# Patient Record
Sex: FEMALE | Race: BLACK OR AFRICAN AMERICAN | NOT HISPANIC OR LATINO | Employment: OTHER | URBAN - METROPOLITAN AREA
[De-identification: names, ages, dates, MRNs, and addresses within clinical notes are randomized per-mention and may not be internally consistent; named-entity substitution may affect disease eponyms.]

---

## 2017-02-28 ENCOUNTER — APPOINTMENT (OUTPATIENT)
Dept: PREADMISSION TESTING | Facility: HOSPITAL | Age: 57
End: 2017-02-28

## 2017-02-28 VITALS
OXYGEN SATURATION: 98 % | TEMPERATURE: 98.8 F | WEIGHT: 163.2 LBS | BODY MASS INDEX: 27.86 KG/M2 | RESPIRATION RATE: 20 BRPM | HEART RATE: 98 BPM | SYSTOLIC BLOOD PRESSURE: 136 MMHG | HEIGHT: 64 IN | DIASTOLIC BLOOD PRESSURE: 84 MMHG

## 2017-02-28 LAB
ANION GAP SERPL CALCULATED.3IONS-SCNC: 23 MMOL/L
BACTERIA UR QL AUTO: ABNORMAL /HPF
BILIRUB UR QL STRIP: NEGATIVE
BUN BLD-MCNC: 14 MG/DL (ref 6–20)
BUN/CREAT SERPL: 14.7 (ref 7–25)
CALCIUM SPEC-SCNC: 10 MG/DL (ref 8.6–10.5)
CHLORIDE SERPL-SCNC: 101 MMOL/L (ref 98–107)
CLARITY UR: CLEAR
CO2 SERPL-SCNC: 15 MMOL/L (ref 22–29)
COLOR UR: YELLOW
CREAT BLD-MCNC: 0.95 MG/DL (ref 0.57–1)
DEPRECATED RDW RBC AUTO: 47.3 FL (ref 37–54)
ERYTHROCYTE [DISTWIDTH] IN BLOOD BY AUTOMATED COUNT: 15.7 % (ref 11.7–13)
GFR SERPL CREATININE-BSD FRML MDRD: 73 ML/MIN/1.73
GLUCOSE BLD-MCNC: 86 MG/DL (ref 65–99)
GLUCOSE UR STRIP-MCNC: NEGATIVE MG/DL
HCT VFR BLD AUTO: 43.3 % (ref 35.6–45.5)
HGB BLD-MCNC: 13.8 G/DL (ref 11.9–15.5)
HGB UR QL STRIP.AUTO: NEGATIVE
HYALINE CASTS UR QL AUTO: ABNORMAL /LPF
KETONES UR QL STRIP: NEGATIVE
LEUKOCYTE ESTERASE UR QL STRIP.AUTO: ABNORMAL
MCH RBC QN AUTO: 26.7 PG (ref 26.9–32)
MCHC RBC AUTO-ENTMCNC: 31.9 G/DL (ref 32.4–36.3)
MCV RBC AUTO: 83.9 FL (ref 80.5–98.2)
NITRITE UR QL STRIP: NEGATIVE
PH UR STRIP.AUTO: 6 [PH] (ref 5–8)
PLATELET # BLD AUTO: 277 10*3/MM3 (ref 140–500)
PMV BLD AUTO: 9.6 FL (ref 6–12)
POTASSIUM BLD-SCNC: 4.8 MMOL/L (ref 3.5–5.2)
PROT UR QL STRIP: NEGATIVE
RBC # BLD AUTO: 5.16 10*6/MM3 (ref 3.9–5.2)
RBC # UR: ABNORMAL /HPF
REF LAB TEST METHOD: ABNORMAL
SODIUM BLD-SCNC: 139 MMOL/L (ref 136–145)
SP GR UR STRIP: 1.01 (ref 1–1.03)
SQUAMOUS #/AREA URNS HPF: ABNORMAL /HPF
UROBILINOGEN UR QL STRIP: ABNORMAL
WBC NRBC COR # BLD: 7.66 10*3/MM3 (ref 4.5–10.7)
WBC UR QL AUTO: ABNORMAL /HPF

## 2017-02-28 PROCEDURE — 85027 COMPLETE CBC AUTOMATED: CPT | Performed by: UROLOGY

## 2017-02-28 PROCEDURE — 36415 COLL VENOUS BLD VENIPUNCTURE: CPT

## 2017-02-28 PROCEDURE — 81001 URINALYSIS AUTO W/SCOPE: CPT | Performed by: UROLOGY

## 2017-02-28 PROCEDURE — 80048 BASIC METABOLIC PNL TOTAL CA: CPT | Performed by: UROLOGY

## 2017-02-28 RX ORDER — DILTIAZEM HYDROCHLORIDE 60 MG/1
60 TABLET, FILM COATED ORAL 2 TIMES DAILY
COMMUNITY
End: 2020-03-04 | Stop reason: ALTCHOICE

## 2017-02-28 RX ORDER — OXYCODONE HYDROCHLORIDE AND ACETAMINOPHEN 5; 325 MG/1; MG/1
1 TABLET ORAL EVERY 6 HOURS PRN
COMMUNITY
End: 2017-03-01 | Stop reason: HOSPADM

## 2017-03-01 ENCOUNTER — HOSPITAL ENCOUNTER (OUTPATIENT)
Facility: HOSPITAL | Age: 57
Setting detail: HOSPITAL OUTPATIENT SURGERY
Discharge: HOME | End: 2017-03-01
Attending: UROLOGY | Admitting: UROLOGY

## 2017-03-01 ENCOUNTER — ANESTHESIA (OUTPATIENT)
Dept: PERIOP | Facility: HOSPITAL | Age: 57
End: 2017-03-01

## 2017-03-01 ENCOUNTER — ANESTHESIA EVENT (OUTPATIENT)
Dept: PERIOP | Facility: HOSPITAL | Age: 57
End: 2017-03-01

## 2017-03-01 ENCOUNTER — APPOINTMENT (OUTPATIENT)
Dept: GENERAL RADIOLOGY | Facility: HOSPITAL | Age: 57
End: 2017-03-01

## 2017-03-01 VITALS
WEIGHT: 161.19 LBS | HEIGHT: 64 IN | TEMPERATURE: 98.6 F | BODY MASS INDEX: 27.52 KG/M2 | DIASTOLIC BLOOD PRESSURE: 68 MMHG | RESPIRATION RATE: 16 BRPM | SYSTOLIC BLOOD PRESSURE: 127 MMHG | HEART RATE: 88 BPM | OXYGEN SATURATION: 98 %

## 2017-03-01 DIAGNOSIS — N20.1 CALCULUS OF LEFT URETER: ICD-10-CM

## 2017-03-01 PROCEDURE — 25010000002 FENTANYL CITRATE (PF) 100 MCG/2ML SOLUTION: Performed by: NURSE ANESTHETIST, CERTIFIED REGISTERED

## 2017-03-01 PROCEDURE — C1769 GUIDE WIRE: HCPCS | Performed by: UROLOGY

## 2017-03-01 PROCEDURE — 74420 UROGRAPHY RTRGR +-KUB: CPT

## 2017-03-01 PROCEDURE — C2617 STENT, NON-COR, TEM W/O DEL: HCPCS | Performed by: UROLOGY

## 2017-03-01 PROCEDURE — 0 IOVERSOL 74 % SOLUTION: Performed by: UROLOGY

## 2017-03-01 PROCEDURE — 25010000002 GENTAMICIN PER 80 MG: Performed by: NURSE ANESTHETIST, CERTIFIED REGISTERED

## 2017-03-01 PROCEDURE — 82360 CALCULUS ASSAY QUANT: CPT | Performed by: UROLOGY

## 2017-03-01 PROCEDURE — 25010000002 DEXAMETHASONE PER 1 MG: Performed by: NURSE ANESTHETIST, CERTIFIED REGISTERED

## 2017-03-01 PROCEDURE — 25010000002 PROPOFOL 10 MG/ML EMULSION: Performed by: NURSE ANESTHETIST, CERTIFIED REGISTERED

## 2017-03-01 PROCEDURE — 25010000002 MIDAZOLAM PER 1 MG: Performed by: ANESTHESIOLOGY

## 2017-03-01 PROCEDURE — 25010000002 ONDANSETRON PER 1 MG: Performed by: NURSE ANESTHETIST, CERTIFIED REGISTERED

## 2017-03-01 DEVICE — URETERAL STENT
Type: IMPLANTABLE DEVICE | Site: URETER | Status: FUNCTIONAL
Brand: CONTOUR™

## 2017-03-01 RX ORDER — ACETAMINOPHEN 500 MG
500 TABLET ORAL EVERY 6 HOURS PRN
COMMUNITY

## 2017-03-01 RX ORDER — LIDOCAINE HYDROCHLORIDE 20 MG/ML
INJECTION, SOLUTION INFILTRATION; PERINEURAL AS NEEDED
Status: DISCONTINUED | OUTPATIENT
Start: 2017-03-01 | End: 2017-03-01 | Stop reason: SURG

## 2017-03-01 RX ORDER — GENTAMICIN SULFATE 40 MG/ML
INJECTION, SOLUTION INTRAMUSCULAR; INTRAVENOUS AS NEEDED
Status: DISCONTINUED | OUTPATIENT
Start: 2017-03-01 | End: 2017-03-01 | Stop reason: SURG

## 2017-03-01 RX ORDER — FENTANYL CITRATE 50 UG/ML
INJECTION, SOLUTION INTRAMUSCULAR; INTRAVENOUS
Status: DISCONTINUED
Start: 2017-03-01 | End: 2017-03-01 | Stop reason: HOSPADM

## 2017-03-01 RX ORDER — OXYCODONE AND ACETAMINOPHEN 7.5; 325 MG/1; MG/1
1 TABLET ORAL ONCE AS NEEDED
Status: DISCONTINUED | OUTPATIENT
Start: 2017-03-01 | End: 2017-03-01 | Stop reason: HOSPADM

## 2017-03-01 RX ORDER — MIDAZOLAM HYDROCHLORIDE 1 MG/ML
2 INJECTION INTRAMUSCULAR; INTRAVENOUS
Status: DISCONTINUED | OUTPATIENT
Start: 2017-03-01 | End: 2017-03-01 | Stop reason: HOSPADM

## 2017-03-01 RX ORDER — SODIUM CHLORIDE, SODIUM LACTATE, POTASSIUM CHLORIDE, CALCIUM CHLORIDE 600; 310; 30; 20 MG/100ML; MG/100ML; MG/100ML; MG/100ML
9 INJECTION, SOLUTION INTRAVENOUS CONTINUOUS
Status: DISCONTINUED | OUTPATIENT
Start: 2017-03-01 | End: 2017-03-01 | Stop reason: HOSPADM

## 2017-03-01 RX ORDER — LABETALOL HYDROCHLORIDE 5 MG/ML
5 INJECTION, SOLUTION INTRAVENOUS
Status: DISCONTINUED | OUTPATIENT
Start: 2017-03-01 | End: 2017-03-01 | Stop reason: HOSPADM

## 2017-03-01 RX ORDER — ONDANSETRON 2 MG/ML
INJECTION INTRAMUSCULAR; INTRAVENOUS AS NEEDED
Status: DISCONTINUED | OUTPATIENT
Start: 2017-03-01 | End: 2017-03-01 | Stop reason: SURG

## 2017-03-01 RX ORDER — GENTAMICIN SULFATE 60 MG/50ML
120 INJECTION, SOLUTION INTRAVENOUS
Status: DISCONTINUED | OUTPATIENT
Start: 2017-03-02 | End: 2017-03-01 | Stop reason: HOSPADM

## 2017-03-01 RX ORDER — NITROFURANTOIN 25; 75 MG/1; MG/1
100 CAPSULE ORAL 2 TIMES DAILY
Qty: 14 CAPSULE | Refills: 0 | Status: SHIPPED | OUTPATIENT
Start: 2017-03-01 | End: 2017-03-08

## 2017-03-01 RX ORDER — ONDANSETRON 4 MG/1
4 TABLET, FILM COATED ORAL ONCE AS NEEDED
Status: DISCONTINUED | OUTPATIENT
Start: 2017-03-01 | End: 2017-03-01 | Stop reason: HOSPADM

## 2017-03-01 RX ORDER — SODIUM CHLORIDE, SODIUM LACTATE, POTASSIUM CHLORIDE, CALCIUM CHLORIDE 600; 310; 30; 20 MG/100ML; MG/100ML; MG/100ML; MG/100ML
100 INJECTION, SOLUTION INTRAVENOUS CONTINUOUS
Status: DISCONTINUED | OUTPATIENT
Start: 2017-03-01 | End: 2017-03-01 | Stop reason: HOSPADM

## 2017-03-01 RX ORDER — PROPOFOL 10 MG/ML
VIAL (ML) INTRAVENOUS AS NEEDED
Status: DISCONTINUED | OUTPATIENT
Start: 2017-03-01 | End: 2017-03-01 | Stop reason: SURG

## 2017-03-01 RX ORDER — SODIUM CHLORIDE 0.9 % (FLUSH) 0.9 %
1-10 SYRINGE (ML) INJECTION AS NEEDED
Status: DISCONTINUED | OUTPATIENT
Start: 2017-03-01 | End: 2017-03-01 | Stop reason: HOSPADM

## 2017-03-01 RX ORDER — OXYCODONE AND ACETAMINOPHEN 7.5; 325 MG/1; MG/1
1-2 TABLET ORAL EVERY 4 HOURS PRN
Qty: 50 TABLET | Refills: 0 | Status: SHIPPED | OUTPATIENT
Start: 2017-03-01 | End: 2020-03-04 | Stop reason: ALTCHOICE

## 2017-03-01 RX ORDER — HYDRALAZINE HYDROCHLORIDE 20 MG/ML
5 INJECTION INTRAMUSCULAR; INTRAVENOUS
Status: DISCONTINUED | OUTPATIENT
Start: 2017-03-01 | End: 2017-03-01 | Stop reason: HOSPADM

## 2017-03-01 RX ORDER — FENTANYL CITRATE 50 UG/ML
50 INJECTION, SOLUTION INTRAMUSCULAR; INTRAVENOUS
Status: DISCONTINUED | OUTPATIENT
Start: 2017-03-01 | End: 2017-03-01 | Stop reason: HOSPADM

## 2017-03-01 RX ORDER — MIDAZOLAM HYDROCHLORIDE 1 MG/ML
1 INJECTION INTRAMUSCULAR; INTRAVENOUS
Status: DISCONTINUED | OUTPATIENT
Start: 2017-03-01 | End: 2017-03-01 | Stop reason: HOSPADM

## 2017-03-01 RX ORDER — DEXAMETHASONE SODIUM PHOSPHATE 10 MG/ML
INJECTION INTRAMUSCULAR; INTRAVENOUS AS NEEDED
Status: DISCONTINUED | OUTPATIENT
Start: 2017-03-01 | End: 2017-03-01 | Stop reason: SURG

## 2017-03-01 RX ORDER — ONDANSETRON 4 MG/1
4 TABLET, FILM COATED ORAL DAILY PRN
Qty: 30 TABLET | Refills: 1 | Status: SHIPPED | OUTPATIENT
Start: 2017-03-01 | End: 2018-03-01

## 2017-03-01 RX ORDER — IPRATROPIUM BROMIDE AND ALBUTEROL SULFATE 2.5; .5 MG/3ML; MG/3ML
3 SOLUTION RESPIRATORY (INHALATION) ONCE AS NEEDED
Status: DISCONTINUED | OUTPATIENT
Start: 2017-03-01 | End: 2017-03-01 | Stop reason: HOSPADM

## 2017-03-01 RX ORDER — ONDANSETRON 2 MG/ML
4 INJECTION INTRAMUSCULAR; INTRAVENOUS ONCE AS NEEDED
Status: DISCONTINUED | OUTPATIENT
Start: 2017-03-01 | End: 2017-03-01 | Stop reason: HOSPADM

## 2017-03-01 RX ORDER — FENTANYL CITRATE 50 UG/ML
INJECTION, SOLUTION INTRAMUSCULAR; INTRAVENOUS AS NEEDED
Status: DISCONTINUED | OUTPATIENT
Start: 2017-03-01 | End: 2017-03-01 | Stop reason: SURG

## 2017-03-01 RX ORDER — FAMOTIDINE 10 MG/ML
20 INJECTION, SOLUTION INTRAVENOUS ONCE
Status: COMPLETED | OUTPATIENT
Start: 2017-03-01 | End: 2017-03-01

## 2017-03-01 RX ADMIN — FENTANYL CITRATE 50 MCG: 50 INJECTION INTRAMUSCULAR; INTRAVENOUS at 18:00

## 2017-03-01 RX ADMIN — DEXAMETHASONE SODIUM PHOSPHATE 8 MG: 10 INJECTION INTRAMUSCULAR; INTRAVENOUS at 17:00

## 2017-03-01 RX ADMIN — LIDOCAINE HYDROCHLORIDE 60 MG: 20 INJECTION, SOLUTION INFILTRATION; PERINEURAL at 16:54

## 2017-03-01 RX ADMIN — FAMOTIDINE 20 MG: 10 INJECTION, SOLUTION INTRAVENOUS at 16:04

## 2017-03-01 RX ADMIN — OXYCODONE HYDROCHLORIDE AND ACETAMINOPHEN 1 TABLET: 7.5; 325 TABLET ORAL at 18:11

## 2017-03-01 RX ADMIN — SODIUM CHLORIDE, POTASSIUM CHLORIDE, SODIUM LACTATE AND CALCIUM CHLORIDE: 600; 310; 30; 20 INJECTION, SOLUTION INTRAVENOUS at 16:39

## 2017-03-01 RX ADMIN — MIDAZOLAM 1 MG: 1 INJECTION INTRAMUSCULAR; INTRAVENOUS at 16:04

## 2017-03-01 RX ADMIN — FENTANYL CITRATE 50 MCG: 50 INJECTION INTRAMUSCULAR; INTRAVENOUS at 18:12

## 2017-03-01 RX ADMIN — PROPOFOL 200 MG: 10 INJECTION, EMULSION INTRAVENOUS at 16:54

## 2017-03-01 RX ADMIN — GENTAMICIN SULFATE 120 MG: 40 INJECTION, SOLUTION INTRAMUSCULAR; INTRAVENOUS at 16:49

## 2017-03-01 RX ADMIN — ONDANSETRON 4 MG: 2 INJECTION INTRAMUSCULAR; INTRAVENOUS at 17:12

## 2017-03-01 RX ADMIN — FENTANYL CITRATE 100 MCG: 50 INJECTION INTRAMUSCULAR; INTRAVENOUS at 16:54

## 2017-03-01 NOTE — PLAN OF CARE
Problem: Patient Care Overview (Adult)  Goal: Plan of Care Review  Outcome: Outcome(s) achieved Date Met:  03/01/17 03/01/17 1846   Coping/Psychosocial Response Interventions   Plan Of Care Reviewed With patient;friend   Patient Care Overview   Progress improving   Outcome Evaluation   Outcome Summary/Follow up Plan ready for d/c home       Goal: Adult Individualization and Mutuality  Outcome: Outcome(s) achieved Date Met:  03/01/17  Goal: Discharge Needs Assessment  Outcome: Outcome(s) achieved Date Met:  03/01/17    Problem: Perioperative Period (Adult)  Goal: Signs and Symptoms of Listed Potential Problems Will be Absent or Manageable (Perioperative Period)  Outcome: Outcome(s) achieved Date Met:  03/01/17

## 2017-03-01 NOTE — ANESTHESIA POSTPROCEDURE EVALUATION
Patient: Cherelle Ayala    Procedure Summary     Date Anesthesia Start Anesthesia Stop Room / Location    03/01/17 [id number]  [facility] / [facility]       Procedure Diagnosis Surgeon Provider    LT URETEROSCOPY LASER LITHOTRIPSY WITH STENT INSERTION AND STONE BASKET EXTRACTION BILATERAL RETROGRADE PYLOGRAM LASER LITHROTRIPSY (Left Ureter) Ureteral calculus, left Buddyer MD Zhou Larkin MD          Anesthesia Type: general  Last vitals  /68 (03/01/17 1840)    Temp 37 °C (98.6 °F) (03/01/17 1810)    Pulse 88 (03/01/17 1840)   Resp 16 (03/01/17 1840)    SpO2 98 % (03/01/17 1840)      Post Anesthesia Care and Evaluation    Patient location during evaluation: PHASE II  Anesthetic complications: No anesthetic complications

## 2017-03-01 NOTE — ANESTHESIA PREPROCEDURE EVALUATION
Anesthesia Evaluation     Patient summary reviewed and Nursing notes reviewed   no history of anesthetic complications:  NPO Status: > 8 hours   Airway   Mallampati: II  TM distance: >3 FB  Neck ROM: full  no difficulty expected  Dental    (+) poor dentation        Pulmonary - negative pulmonary ROS and normal exam   Cardiovascular - negative cardio ROS and normal exam        Neuro/Psych  GI/Hepatic/Renal/Endo    (+)  chronic renal disease stones,     ROS Comment: C/O nausea but no vomiting    Musculoskeletal     Abdominal    Substance History      OB/GYN          Other                                    Anesthesia Plan    ASA 2     general     Anesthetic plan and risks discussed with patient.

## 2017-03-01 NOTE — DISCHARGE INSTRUCTIONS
You had one Percocet for pain at 6:11 pm.      Outpatient Surgery Guidelines, Adult  Outpatient procedures are those for which the person having the procedure is allowed to go home the same day as the procedure. Various procedures are done on an outpatient basis. You should follow some general guidelines if you will be having an outpatient procedure.  AFTER THE  PROCEDURE  After surgery, you will be taken to a recovery area, where your progress will be monitored. If there are no complications, you will be allowed to go home when you are awake, stable, and taking fluids well. You may have numbness around the surgical site. Healing will take some time. You will have tenderness at the surgical site and may have some swelling and bruising. You may also have some nausea.  HOME CARE INSTRUCTIONS  · Do not drive for 24 hours, or as directed by your health care provider. Do not drive while taking prescription pain medicines.  · Do not drink alcohol for 24 hours.  · Do not make important decisions or sign legal documents for 24 hours.  · Plan on having a responsible adult stay with your for 24 hours following your procedure.  · You may resume a normal diet and activities as directed.  · Do not lift anything heavier than 10 pounds (4.5 kg) or play contact sports until your health care provider says it is okay.  · Only take over-the-counter or prescription medicines as directed by your health care provider.  · Follow up with your health care provider as directed.  SEEK MEDICAL CARE IF:  · You have increased bleeding (more than a small spot) from the surgical site.  · You have redness, swelling, or increasing pain in the wound.  · You see pus coming from the wound.  · You have a fever > 101.  · You notice a bad smell coming from the wound or dressing.  · You feel lightheaded or faint.  · You develop a rash.  · You have trouble breathing.  · You develop allergies.  MAKE SURE YOU:  · Understand these instructions.  · Will watch  your condition.  · Will get help right away if you are not doing well or get worse.

## 2017-03-01 NOTE — BRIEF OP NOTE
URETEROSCOPY LASER LITHOTRIPSY WITH STENT INSERTION  Procedure Note    Cherelle ANDUJAR Jamie  3/1/2017    Pre-op Diagnosis:   * No pre-op diagnosis entered *    Post-op Diagnosis:     Post-Op Diagnosis Codes:     * Ureteral calculus, left [N20.1]    Procedure/CPT® Codes:      Procedure(s):  LT URETEROSCOPY LASER LITHOTRIPSY WITH STENT INSERTION AND STONE BASKET EXTRACTION     Surgeon(s):  Arnol Hernandez MD    Anesthesia: General    Staff:   Circulator: Yuridia Deluna RN  Laser Staff: Elizabeth Abad RN  Radiology Technologist: Marcelina Mancia  Scrub Person: Jovana Rodriguez    Estimated Blood Loss: * No values recorded between 3/1/2017  4:48 PM and 3/1/2017  5:29 PM *  Urine Voided: 100 mL    Specimens:                  ID Type Source Tests Collected by Time Destination   1 : LEFT KIDNEY STONE Calculus Kidney, Left STONE ANALYSIS Arnol Hernandez MD 3/1/2017 1719          Drains:           Findings: left mid ureteral stone    Complications: none      Arnol Hernandez MD     Date: 3/1/2017  Time: 5:31 PM

## 2017-03-01 NOTE — PLAN OF CARE
Problem: Patient Care Overview (Adult)  Goal: Plan of Care Review  Outcome: Ongoing (interventions implemented as appropriate)    03/01/17 1541   Coping/Psychosocial Response Interventions   Plan Of Care Reviewed With patient       Goal: Adult Individualization and Mutuality  Outcome: Ongoing (interventions implemented as appropriate)    03/01/17 1541   Individualization   Patient Specific Preferences pt goes by [facility]       Goal: Discharge Needs Assessment  Outcome: Ongoing (interventions implemented as appropriate)    Problem: Perioperative Period (Adult)  Goal: Signs and Symptoms of Listed Potential Problems Will be Absent or Manageable (Perioperative Period)  Outcome: Ongoing (interventions implemented as appropriate)    03/01/17 1541   Perioperative Period   Problems Assessed (Perioperative Period) pain   Problems Present (Perioperative Period) none

## 2017-03-01 NOTE — ANESTHESIA PROCEDURE NOTES
Airway  Urgency: elective    Airway not difficult    General Information and Staff    Patient location during procedure: OR  Anesthesiologist: FERNANDO SCHREIBER  CRNA: CHRISTA YU    Indications and Patient Condition  Indications for airway management: airway protection    Preoxygenated: yes  Mask difficulty assessment: 1 - vent by mask    Final Airway Details  Final airway type: supraglottic airway      Successful airway: classic  Size 4    Number of attempts at approach: 1    Additional Comments  PreO2, IV induction, easy mask, LMA placed w/o difficulty, cuff as packaged- no additional air placed, secured in placed, EBBSH, +etCO2, atraumatic, very very, poor dental hygiene, teeth decayed, chipped, broken, cracked, and missing,  teeth and lips as preop.

## 2017-03-02 NOTE — OP NOTE
DATE OF PROCEDURE:  03/01/2017    PREOPERATIVE DIAGNOSIS: Left distal ureteral calculus.     POSTOPERATIVE DIAGNOSIS: Left mid ureteral calculus.     PROCEDURES PERFORMED:  1.  Cystoscopy with left retrograde pyelogram.    2.  Left ureteroscopy with laser lithotripsy and basket extraction of fragments.    3.  Left double-J stent placement.     SURGEON: Arnol Hernandez MD    ANESTHESIA: General.     DRAINS: A 6-Divehi x 24 cm double-J stent.     COMPLICATIONS: None.     SPECIMENS: Stone fragments.     INDICATIONS: This 56-year-old female has a distal obstructing calculus, now presents for ureteroscopic ablation.     DESCRIPTION OF PROCEDURE: The patient was taken to the operative suite and given general anesthesia. She was placed in lithotomy position. Prepped and draped in sterile fashion. Panendoscopy was performed. Bladder was thoroughly visualized. The left orifice was cannulated with a Pollack catheter.  A retrograde pyelogram showed the stone being more in the mid ureteral segment. A guide wire was passed up. Ureteroscope was inserted and passed around the guide wire, up the ureter to the level of the stone. A 365 micron holmium fiber was then passed.  The stone was destroyed into multiple fragments and then these fragments were removed with a basket. A 6-Divehi x 26 cm double-J stent was then placed without a tether. Her bladder was drained. She was awoken and taken to recovery in stable condition.      Arnol Hernandez M.D.  OLINDA:lashawn  D:   03/01/2017 22:40:44  T:   03/02/2017 00:53:56  Job ID:   [id number]  Document ID:   [id number]  cc:

## 2017-03-08 LAB
CA PHOS CRY STONE QL IR: 5 %
COLOR STONE: NORMAL
COM CRY STONE QL IR: 95 %
COMPN STONE: NORMAL
Lab: NORMAL
Lab: NORMAL
NIDUS STONE QL: NORMAL
PATH REPORT.COMMENTS IMP SPEC: NORMAL
SIZE STONE: NORMAL MM
WT STONE: 10.6 MG

## 2018-04-18 ENCOUNTER — APPOINTMENT (OUTPATIENT)
Dept: GENERAL RADIOLOGY | Facility: HOSPITAL | Age: 58
End: 2018-04-18

## 2018-04-18 ENCOUNTER — APPOINTMENT (OUTPATIENT)
Dept: GENERAL RADIOLOGY | Facility: HOSPITAL | Age: 58
End: 2018-04-18
Attending: ANESTHESIOLOGY

## 2018-04-18 ENCOUNTER — ANESTHESIA (OUTPATIENT)
Dept: PERIOP | Facility: HOSPITAL | Age: 58
End: 2018-04-18

## 2018-04-18 ENCOUNTER — APPOINTMENT (OUTPATIENT)
Dept: CT IMAGING | Facility: HOSPITAL | Age: 58
End: 2018-04-18

## 2018-04-18 ENCOUNTER — ANESTHESIA EVENT (OUTPATIENT)
Dept: PERIOP | Facility: HOSPITAL | Age: 58
End: 2018-04-18

## 2018-04-18 ENCOUNTER — HOSPITAL ENCOUNTER (OUTPATIENT)
Facility: HOSPITAL | Age: 58
Discharge: HOME | End: 2018-04-20
Attending: EMERGENCY MEDICINE | Admitting: UROLOGY

## 2018-04-18 DIAGNOSIS — N20.0 NEPHROLITHIASIS: ICD-10-CM

## 2018-04-18 DIAGNOSIS — N20.1 URETERAL CALCULUS, LEFT: Primary | ICD-10-CM

## 2018-04-18 DIAGNOSIS — N20.1 CALCULUS OF LEFT URETER: ICD-10-CM

## 2018-04-18 LAB
ALBUMIN SERPL-MCNC: 4 G/DL (ref 3.5–5.2)
ALBUMIN/GLOB SERPL: 1.3 G/DL
ALP SERPL-CCNC: 88 U/L (ref 39–117)
ALT SERPL W P-5'-P-CCNC: 12 U/L (ref 1–33)
ANION GAP SERPL CALCULATED.3IONS-SCNC: 14.9 MMOL/L
AST SERPL-CCNC: 13 U/L (ref 1–32)
BACTERIA UR QL AUTO: ABNORMAL /HPF
BASOPHILS # BLD AUTO: 0.03 10*3/MM3 (ref 0–0.2)
BASOPHILS NFR BLD AUTO: 0.4 % (ref 0–1.5)
BILIRUB SERPL-MCNC: 0.2 MG/DL (ref 0.1–1.2)
BILIRUB UR QL STRIP: NEGATIVE
BUN BLD-MCNC: 15 MG/DL (ref 6–20)
BUN/CREAT SERPL: 18.8 (ref 7–25)
CALCIUM SPEC-SCNC: 9.4 MG/DL (ref 8.6–10.5)
CHLORIDE SERPL-SCNC: 107 MMOL/L (ref 98–107)
CLARITY UR: CLEAR
CO2 SERPL-SCNC: 23.1 MMOL/L (ref 22–29)
COLOR UR: YELLOW
CREAT BLD-MCNC: 0.8 MG/DL (ref 0.57–1)
DEPRECATED RDW RBC AUTO: 45.4 FL (ref 37–54)
EOSINOPHIL # BLD AUTO: 0.11 10*3/MM3 (ref 0–0.7)
EOSINOPHIL NFR BLD AUTO: 1.5 % (ref 0.3–6.2)
ERYTHROCYTE [DISTWIDTH] IN BLOOD BY AUTOMATED COUNT: 15.2 % (ref 11.7–13)
GFR SERPL CREATININE-BSD FRML MDRD: 89 ML/MIN/1.73
GLOBULIN UR ELPH-MCNC: 3.1 GM/DL
GLUCOSE BLD-MCNC: 88 MG/DL (ref 65–99)
GLUCOSE UR STRIP-MCNC: NEGATIVE MG/DL
HCT VFR BLD AUTO: 38.3 % (ref 35.6–45.5)
HGB BLD-MCNC: 11.5 G/DL (ref 11.9–15.5)
HGB UR QL STRIP.AUTO: NEGATIVE
HOLD SPECIMEN: NORMAL
HOLD SPECIMEN: NORMAL
HYALINE CASTS UR QL AUTO: ABNORMAL /LPF
IMM GRANULOCYTES # BLD: 0 10*3/MM3 (ref 0–0.03)
IMM GRANULOCYTES NFR BLD: 0 % (ref 0–0.5)
KETONES UR QL STRIP: NEGATIVE
LEUKOCYTE ESTERASE UR QL STRIP.AUTO: ABNORMAL
LIPASE SERPL-CCNC: 39 U/L (ref 13–60)
LYMPHOCYTES # BLD AUTO: 2.79 10*3/MM3 (ref 0.9–4.8)
LYMPHOCYTES NFR BLD AUTO: 37.8 % (ref 19.6–45.3)
MCH RBC QN AUTO: 24.7 PG (ref 26.9–32)
MCHC RBC AUTO-ENTMCNC: 30 G/DL (ref 32.4–36.3)
MCV RBC AUTO: 82.4 FL (ref 80.5–98.2)
MONOCYTES # BLD AUTO: 0.36 10*3/MM3 (ref 0.2–1.2)
MONOCYTES NFR BLD AUTO: 4.9 % (ref 5–12)
NEUTROPHILS # BLD AUTO: 4.09 10*3/MM3 (ref 1.9–8.1)
NEUTROPHILS NFR BLD AUTO: 55.4 % (ref 42.7–76)
NITRITE UR QL STRIP: POSITIVE
PH UR STRIP.AUTO: <=5 [PH] (ref 5–8)
PLATELET # BLD AUTO: 289 10*3/MM3 (ref 140–500)
PMV BLD AUTO: 10.1 FL (ref 6–12)
POTASSIUM BLD-SCNC: 3.9 MMOL/L (ref 3.5–5.2)
PROT SERPL-MCNC: 7.1 G/DL (ref 6–8.5)
PROT UR QL STRIP: NEGATIVE
RBC # BLD AUTO: 4.65 10*6/MM3 (ref 3.9–5.2)
RBC # UR: ABNORMAL /HPF
REF LAB TEST METHOD: ABNORMAL
SODIUM BLD-SCNC: 145 MMOL/L (ref 136–145)
SP GR UR STRIP: 1.02 (ref 1–1.03)
SQUAMOUS #/AREA URNS HPF: ABNORMAL /HPF
UROBILINOGEN UR QL STRIP: ABNORMAL
WBC NRBC COR # BLD: 7.38 10*3/MM3 (ref 4.5–10.7)
WBC UR QL AUTO: ABNORMAL /HPF
WHOLE BLOOD HOLD SPECIMEN: NORMAL
WHOLE BLOOD HOLD SPECIMEN: NORMAL

## 2018-04-18 PROCEDURE — 81001 URINALYSIS AUTO W/SCOPE: CPT

## 2018-04-18 PROCEDURE — 36415 COLL VENOUS BLD VENIPUNCTURE: CPT

## 2018-04-18 PROCEDURE — 25010000002 MIDAZOLAM PER 1 MG: Performed by: ANESTHESIOLOGY

## 2018-04-18 PROCEDURE — 25010000002 ONDANSETRON PER 1 MG: Performed by: ANESTHESIOLOGY

## 2018-04-18 PROCEDURE — G0378 HOSPITAL OBSERVATION PER HR: HCPCS

## 2018-04-18 PROCEDURE — C1769 GUIDE WIRE: HCPCS | Performed by: UROLOGY

## 2018-04-18 PROCEDURE — 96365 THER/PROPH/DIAG IV INF INIT: CPT

## 2018-04-18 PROCEDURE — 99284 EMERGENCY DEPT VISIT MOD MDM: CPT

## 2018-04-18 PROCEDURE — 83690 ASSAY OF LIPASE: CPT

## 2018-04-18 PROCEDURE — C1751 CATH, INF, PER/CENT/MIDLINE: HCPCS | Performed by: ANESTHESIOLOGY

## 2018-04-18 PROCEDURE — 85025 COMPLETE CBC W/AUTO DIFF WBC: CPT

## 2018-04-18 PROCEDURE — 74420 UROGRAPHY RTRGR +-KUB: CPT

## 2018-04-18 PROCEDURE — 25010000002 DIPHENHYDRAMINE PER 50 MG

## 2018-04-18 PROCEDURE — 96361 HYDRATE IV INFUSION ADD-ON: CPT

## 2018-04-18 PROCEDURE — 25010000002 FENTANYL CITRATE (PF) 100 MCG/2ML SOLUTION: Performed by: ANESTHESIOLOGY

## 2018-04-18 PROCEDURE — 25010000002 PROPOFOL 10 MG/ML EMULSION: Performed by: ANESTHESIOLOGY

## 2018-04-18 PROCEDURE — 74176 CT ABD & PELVIS W/O CONTRAST: CPT

## 2018-04-18 PROCEDURE — 87086 URINE CULTURE/COLONY COUNT: CPT

## 2018-04-18 PROCEDURE — 25010000002 DEXAMETHASONE PER 1 MG: Performed by: ANESTHESIOLOGY

## 2018-04-18 PROCEDURE — 25010000002 ONDANSETRON PER 1 MG: Performed by: EMERGENCY MEDICINE

## 2018-04-18 PROCEDURE — 87186 SC STD MICRODIL/AGAR DIL: CPT | Performed by: EMERGENCY MEDICINE

## 2018-04-18 PROCEDURE — 25010000003 CEFTRIAXONE PER 250 MG: Performed by: EMERGENCY MEDICINE

## 2018-04-18 PROCEDURE — 25010000002 HYDROMORPHONE PER 4 MG: Performed by: ANESTHESIOLOGY

## 2018-04-18 PROCEDURE — 96375 TX/PRO/DX INJ NEW DRUG ADDON: CPT

## 2018-04-18 PROCEDURE — C2617 STENT, NON-COR, TEM W/O DEL: HCPCS | Performed by: UROLOGY

## 2018-04-18 PROCEDURE — 80053 COMPREHEN METABOLIC PANEL: CPT

## 2018-04-18 PROCEDURE — 25010000002 MORPHINE PER 10 MG: Performed by: EMERGENCY MEDICINE

## 2018-04-18 DEVICE — URETERAL STENT
Type: IMPLANTABLE DEVICE | Site: URETER | Status: FUNCTIONAL
Brand: CONTOUR™

## 2018-04-18 RX ORDER — SODIUM CHLORIDE 0.9 % (FLUSH) 0.9 %
10 SYRINGE (ML) INJECTION AS NEEDED
Status: DISCONTINUED | OUTPATIENT
Start: 2018-04-18 | End: 2018-04-18 | Stop reason: HOSPADM

## 2018-04-18 RX ORDER — PROMETHAZINE HYDROCHLORIDE 25 MG/1
12.5 TABLET ORAL ONCE AS NEEDED
Status: DISCONTINUED | OUTPATIENT
Start: 2018-04-18 | End: 2018-04-18 | Stop reason: HOSPADM

## 2018-04-18 RX ORDER — OXYCODONE AND ACETAMINOPHEN 7.5; 325 MG/1; MG/1
1 TABLET ORAL ONCE AS NEEDED
Status: DISCONTINUED | OUTPATIENT
Start: 2018-04-18 | End: 2018-04-18 | Stop reason: HOSPADM

## 2018-04-18 RX ORDER — PROMETHAZINE HYDROCHLORIDE 25 MG/1
25 SUPPOSITORY RECTAL ONCE AS NEEDED
Status: DISCONTINUED | OUTPATIENT
Start: 2018-04-18 | End: 2018-04-18 | Stop reason: HOSPADM

## 2018-04-18 RX ORDER — ONDANSETRON 2 MG/ML
4 INJECTION INTRAMUSCULAR; INTRAVENOUS EVERY 6 HOURS PRN
Status: DISCONTINUED | OUTPATIENT
Start: 2018-04-18 | End: 2018-04-21 | Stop reason: HOSPADM

## 2018-04-18 RX ORDER — ONDANSETRON 4 MG/1
4 TABLET, ORALLY DISINTEGRATING ORAL EVERY 6 HOURS PRN
Status: DISCONTINUED | OUTPATIENT
Start: 2018-04-18 | End: 2018-04-21 | Stop reason: HOSPADM

## 2018-04-18 RX ORDER — ACETAMINOPHEN 500 MG
500 TABLET ORAL EVERY 6 HOURS PRN
Status: DISCONTINUED | OUTPATIENT
Start: 2018-04-18 | End: 2018-04-21 | Stop reason: HOSPADM

## 2018-04-18 RX ORDER — EPHEDRINE SULFATE 50 MG/ML
5 INJECTION, SOLUTION INTRAVENOUS ONCE AS NEEDED
Status: DISCONTINUED | OUTPATIENT
Start: 2018-04-18 | End: 2018-04-18 | Stop reason: HOSPADM

## 2018-04-18 RX ORDER — FENTANYL CITRATE 50 UG/ML
50 INJECTION, SOLUTION INTRAMUSCULAR; INTRAVENOUS
Status: DISCONTINUED | OUTPATIENT
Start: 2018-04-18 | End: 2018-04-18 | Stop reason: HOSPADM

## 2018-04-18 RX ORDER — DEXAMETHASONE SODIUM PHOSPHATE 10 MG/ML
INJECTION INTRAMUSCULAR; INTRAVENOUS AS NEEDED
Status: DISCONTINUED | OUTPATIENT
Start: 2018-04-18 | End: 2018-04-18 | Stop reason: SURG

## 2018-04-18 RX ORDER — HYDRALAZINE HYDROCHLORIDE 20 MG/ML
5 INJECTION INTRAMUSCULAR; INTRAVENOUS
Status: DISCONTINUED | OUTPATIENT
Start: 2018-04-18 | End: 2018-04-18 | Stop reason: HOSPADM

## 2018-04-18 RX ORDER — PROMETHAZINE HYDROCHLORIDE 25 MG/ML
12.5 INJECTION, SOLUTION INTRAMUSCULAR; INTRAVENOUS ONCE AS NEEDED
Status: DISCONTINUED | OUTPATIENT
Start: 2018-04-18 | End: 2018-04-18 | Stop reason: HOSPADM

## 2018-04-18 RX ORDER — MAGNESIUM HYDROXIDE 1200 MG/15ML
LIQUID ORAL AS NEEDED
Status: DISCONTINUED | OUTPATIENT
Start: 2018-04-18 | End: 2018-04-18 | Stop reason: HOSPADM

## 2018-04-18 RX ORDER — PANTOPRAZOLE SODIUM 40 MG/1
40 TABLET, DELAYED RELEASE ORAL EVERY MORNING
Status: DISCONTINUED | OUTPATIENT
Start: 2018-04-19 | End: 2018-04-21 | Stop reason: HOSPADM

## 2018-04-18 RX ORDER — OMEPRAZOLE 40 MG/1
40 CAPSULE, DELAYED RELEASE ORAL DAILY
COMMUNITY
End: 2021-03-22

## 2018-04-18 RX ORDER — SODIUM CHLORIDE 450 MG/100ML
125 INJECTION, SOLUTION INTRAVENOUS CONTINUOUS
Status: DISCONTINUED | OUTPATIENT
Start: 2018-04-18 | End: 2018-04-21 | Stop reason: HOSPADM

## 2018-04-18 RX ORDER — ONDANSETRON 4 MG/1
4 TABLET, FILM COATED ORAL EVERY 6 HOURS PRN
Status: DISCONTINUED | OUTPATIENT
Start: 2018-04-18 | End: 2018-04-21 | Stop reason: HOSPADM

## 2018-04-18 RX ORDER — ONDANSETRON 2 MG/ML
INJECTION INTRAMUSCULAR; INTRAVENOUS AS NEEDED
Status: DISCONTINUED | OUTPATIENT
Start: 2018-04-18 | End: 2018-04-18 | Stop reason: SURG

## 2018-04-18 RX ORDER — MORPHINE SULFATE 2 MG/ML
2 INJECTION, SOLUTION INTRAMUSCULAR; INTRAVENOUS ONCE
Status: COMPLETED | OUTPATIENT
Start: 2018-04-18 | End: 2018-04-18

## 2018-04-18 RX ORDER — SODIUM CHLORIDE, SODIUM LACTATE, POTASSIUM CHLORIDE, CALCIUM CHLORIDE 600; 310; 30; 20 MG/100ML; MG/100ML; MG/100ML; MG/100ML
9 INJECTION, SOLUTION INTRAVENOUS CONTINUOUS
Status: DISCONTINUED | OUTPATIENT
Start: 2018-04-18 | End: 2018-04-18 | Stop reason: HOSPADM

## 2018-04-18 RX ORDER — SODIUM CHLORIDE 0.9 % (FLUSH) 0.9 %
1-10 SYRINGE (ML) INJECTION AS NEEDED
Status: DISCONTINUED | OUTPATIENT
Start: 2018-04-18 | End: 2018-04-18 | Stop reason: HOSPADM

## 2018-04-18 RX ORDER — ONDANSETRON 2 MG/ML
4 INJECTION INTRAMUSCULAR; INTRAVENOUS ONCE AS NEEDED
Status: COMPLETED | OUTPATIENT
Start: 2018-04-18 | End: 2018-04-18

## 2018-04-18 RX ORDER — OXYCODONE AND ACETAMINOPHEN 7.5; 325 MG/1; MG/1
1 TABLET ORAL EVERY 4 HOURS PRN
Status: DISCONTINUED | OUTPATIENT
Start: 2018-04-18 | End: 2018-04-19

## 2018-04-18 RX ORDER — CEFTRIAXONE SODIUM 2 G/50ML
2 INJECTION, SOLUTION INTRAVENOUS ONCE
Status: COMPLETED | OUTPATIENT
Start: 2018-04-18 | End: 2018-04-18

## 2018-04-18 RX ORDER — DIPHENHYDRAMINE HYDROCHLORIDE 50 MG/ML
25 INJECTION INTRAMUSCULAR; INTRAVENOUS ONCE
Status: COMPLETED | OUTPATIENT
Start: 2018-04-18 | End: 2018-04-18

## 2018-04-18 RX ORDER — HYDROCODONE BITARTRATE AND ACETAMINOPHEN 7.5; 325 MG/1; MG/1
1 TABLET ORAL ONCE AS NEEDED
Status: DISCONTINUED | OUTPATIENT
Start: 2018-04-18 | End: 2018-04-18 | Stop reason: HOSPADM

## 2018-04-18 RX ORDER — NALOXONE HCL 0.4 MG/ML
0.2 VIAL (ML) INJECTION AS NEEDED
Status: DISCONTINUED | OUTPATIENT
Start: 2018-04-18 | End: 2018-04-18 | Stop reason: HOSPADM

## 2018-04-18 RX ORDER — FENTANYL CITRATE 50 UG/ML
INJECTION, SOLUTION INTRAMUSCULAR; INTRAVENOUS AS NEEDED
Status: DISCONTINUED | OUTPATIENT
Start: 2018-04-18 | End: 2018-04-18 | Stop reason: SURG

## 2018-04-18 RX ORDER — FAMOTIDINE 10 MG/ML
20 INJECTION, SOLUTION INTRAVENOUS ONCE
Status: COMPLETED | OUTPATIENT
Start: 2018-04-18 | End: 2018-04-18

## 2018-04-18 RX ORDER — LABETALOL HYDROCHLORIDE 5 MG/ML
5 INJECTION, SOLUTION INTRAVENOUS
Status: DISCONTINUED | OUTPATIENT
Start: 2018-04-18 | End: 2018-04-18 | Stop reason: HOSPADM

## 2018-04-18 RX ORDER — FLUMAZENIL 0.1 MG/ML
0.2 INJECTION INTRAVENOUS AS NEEDED
Status: DISCONTINUED | OUTPATIENT
Start: 2018-04-18 | End: 2018-04-18 | Stop reason: HOSPADM

## 2018-04-18 RX ORDER — DILTIAZEM HYDROCHLORIDE 60 MG/1
60 TABLET, FILM COATED ORAL EVERY 12 HOURS SCHEDULED
Status: DISCONTINUED | OUTPATIENT
Start: 2018-04-18 | End: 2018-04-21 | Stop reason: HOSPADM

## 2018-04-18 RX ORDER — SUCRALFATE 1 G/1
1 TABLET ORAL 4 TIMES DAILY
COMMUNITY
End: 2020-10-27

## 2018-04-18 RX ORDER — PROPOFOL 10 MG/ML
VIAL (ML) INTRAVENOUS AS NEEDED
Status: DISCONTINUED | OUTPATIENT
Start: 2018-04-18 | End: 2018-04-18 | Stop reason: SURG

## 2018-04-18 RX ORDER — MIDAZOLAM HYDROCHLORIDE 1 MG/ML
1 INJECTION INTRAMUSCULAR; INTRAVENOUS
Status: DISCONTINUED | OUTPATIENT
Start: 2018-04-18 | End: 2018-04-18 | Stop reason: HOSPADM

## 2018-04-18 RX ORDER — DIPHENHYDRAMINE HYDROCHLORIDE 50 MG/ML
INJECTION INTRAMUSCULAR; INTRAVENOUS
Status: COMPLETED
Start: 2018-04-18 | End: 2018-04-18

## 2018-04-18 RX ORDER — ONDANSETRON 2 MG/ML
4 INJECTION INTRAMUSCULAR; INTRAVENOUS ONCE
Status: COMPLETED | OUTPATIENT
Start: 2018-04-18 | End: 2018-04-18

## 2018-04-18 RX ORDER — LIDOCAINE HYDROCHLORIDE 20 MG/ML
INJECTION, SOLUTION INFILTRATION; PERINEURAL AS NEEDED
Status: DISCONTINUED | OUTPATIENT
Start: 2018-04-18 | End: 2018-04-18 | Stop reason: SURG

## 2018-04-18 RX ORDER — PROMETHAZINE HYDROCHLORIDE 25 MG/1
25 TABLET ORAL ONCE AS NEEDED
Status: DISCONTINUED | OUTPATIENT
Start: 2018-04-18 | End: 2018-04-18 | Stop reason: HOSPADM

## 2018-04-18 RX ORDER — FENTANYL CITRATE 50 UG/ML
INJECTION, SOLUTION INTRAMUSCULAR; INTRAVENOUS
Status: COMPLETED
Start: 2018-04-18 | End: 2018-04-18

## 2018-04-18 RX ORDER — SUCRALFATE 1 G/1
1 TABLET ORAL 4 TIMES DAILY
Status: DISCONTINUED | OUTPATIENT
Start: 2018-04-18 | End: 2018-04-21 | Stop reason: HOSPADM

## 2018-04-18 RX ORDER — MIDAZOLAM HYDROCHLORIDE 1 MG/ML
2 INJECTION INTRAMUSCULAR; INTRAVENOUS
Status: DISCONTINUED | OUTPATIENT
Start: 2018-04-18 | End: 2018-04-18 | Stop reason: HOSPADM

## 2018-04-18 RX ORDER — HYDROMORPHONE HCL 110MG/55ML
0.5 PATIENT CONTROLLED ANALGESIA SYRINGE INTRAVENOUS
Status: DISCONTINUED | OUTPATIENT
Start: 2018-04-18 | End: 2018-04-18 | Stop reason: HOSPADM

## 2018-04-18 RX ORDER — LIDOCAINE HYDROCHLORIDE 10 MG/ML
0.5 INJECTION, SOLUTION EPIDURAL; INFILTRATION; INTRACAUDAL; PERINEURAL ONCE AS NEEDED
Status: DISCONTINUED | OUTPATIENT
Start: 2018-04-18 | End: 2018-04-18 | Stop reason: HOSPADM

## 2018-04-18 RX ORDER — CEFTRIAXONE SODIUM 1 G/50ML
1 INJECTION, SOLUTION INTRAVENOUS EVERY 24 HOURS
Status: DISCONTINUED | OUTPATIENT
Start: 2018-04-19 | End: 2018-04-21 | Stop reason: HOSPADM

## 2018-04-18 RX ADMIN — MORPHINE SULFATE 2 MG: 2 INJECTION, SOLUTION INTRAMUSCULAR; INTRAVENOUS at 18:24

## 2018-04-18 RX ADMIN — LIDOCAINE HYDROCHLORIDE 80 MG: 20 INJECTION, SOLUTION INFILTRATION; PERINEURAL at 21:45

## 2018-04-18 RX ADMIN — FENTANYL CITRATE 25 MCG: 50 INJECTION INTRAMUSCULAR; INTRAVENOUS at 21:45

## 2018-04-18 RX ADMIN — HYDROMORPHONE HYDROCHLORIDE 0.5 MG: 2 INJECTION INTRAMUSCULAR; INTRAVENOUS; SUBCUTANEOUS at 23:22

## 2018-04-18 RX ADMIN — SODIUM CHLORIDE 1000 ML: 9 INJECTION, SOLUTION INTRAVENOUS at 16:36

## 2018-04-18 RX ADMIN — PROPOFOL 200 MG: 10 INJECTION, EMULSION INTRAVENOUS at 21:45

## 2018-04-18 RX ADMIN — DIPHENHYDRAMINE HYDROCHLORIDE 25 MG: 50 INJECTION, SOLUTION INTRAMUSCULAR; INTRAVENOUS at 18:39

## 2018-04-18 RX ADMIN — FAMOTIDINE 20 MG: 10 INJECTION INTRAVENOUS at 21:37

## 2018-04-18 RX ADMIN — CEFTRIAXONE SODIUM 2 G: 2 INJECTION, SOLUTION INTRAVENOUS at 16:36

## 2018-04-18 RX ADMIN — MIDAZOLAM HYDROCHLORIDE 2 MG: 2 INJECTION, SOLUTION INTRAMUSCULAR; INTRAVENOUS at 21:37

## 2018-04-18 RX ADMIN — HYDROMORPHONE HYDROCHLORIDE 0.5 MG: 2 INJECTION INTRAMUSCULAR; INTRAVENOUS; SUBCUTANEOUS at 22:53

## 2018-04-18 RX ADMIN — SODIUM CHLORIDE, POTASSIUM CHLORIDE, SODIUM LACTATE AND CALCIUM CHLORIDE: 600; 310; 30; 20 INJECTION, SOLUTION INTRAVENOUS at 21:41

## 2018-04-18 RX ADMIN — ONDANSETRON 4 MG: 2 INJECTION INTRAMUSCULAR; INTRAVENOUS at 18:22

## 2018-04-18 RX ADMIN — HYDROMORPHONE HYDROCHLORIDE 0.5 MG: 2 INJECTION INTRAMUSCULAR; INTRAVENOUS; SUBCUTANEOUS at 23:03

## 2018-04-18 RX ADMIN — ONDANSETRON 4 MG: 2 INJECTION INTRAMUSCULAR; INTRAVENOUS at 21:51

## 2018-04-18 RX ADMIN — SODIUM CHLORIDE, POTASSIUM CHLORIDE, SODIUM LACTATE AND CALCIUM CHLORIDE 9 ML/HR: 600; 310; 30; 20 INJECTION, SOLUTION INTRAVENOUS at 21:37

## 2018-04-18 RX ADMIN — FENTANYL CITRATE 25 MCG: 50 INJECTION INTRAMUSCULAR; INTRAVENOUS at 21:57

## 2018-04-18 RX ADMIN — DEXAMETHASONE SODIUM PHOSPHATE 4 MG: 10 INJECTION INTRAMUSCULAR; INTRAVENOUS at 21:51

## 2018-04-18 RX ADMIN — DIPHENHYDRAMINE HYDROCHLORIDE 25 MG: 50 INJECTION INTRAMUSCULAR; INTRAVENOUS at 18:39

## 2018-04-18 RX ADMIN — FENTANYL CITRATE 50 MCG: 50 INJECTION INTRAMUSCULAR; INTRAVENOUS at 22:30

## 2018-04-18 RX ADMIN — FENTANYL CITRATE 50 MCG: 50 INJECTION INTRAMUSCULAR; INTRAVENOUS at 22:44

## 2018-04-18 RX ADMIN — ONDANSETRON 4 MG: 2 INJECTION INTRAMUSCULAR; INTRAVENOUS at 22:30

## 2018-04-18 NOTE — ED PROVIDER NOTES
[facility] EMERGENCY DEPARTMENT ENCOUNTER    CHIEF COMPLAINT  Chief Complaint: urinary symptoms  History given by: patient  History limited by: nothing  [facility] Room Number: 53/53  PMD: CHERYL Smiley      HPI:  Pt is a 57 y.o. female who presents complaining of urinary frequency and urgency since yesterday. Pt also has pain in her right inguinal area. She denies flank pain at presentation but states that she had some yesterday.  The flank pain was located in the left flank and felt similar to her previous kidney stones.  She has also been nauseas, but denies fever. Pt has no other complaints at this time.       Onset: gradual  Duration: two days  Severity: moderate  Associated symptoms: nausea  Previous treatment:  none    PAST MEDICAL HISTORY  Active Ambulatory Problems     Diagnosis Date Noted   • Ureteral calculus, left 03/01/2017     Resolved Ambulatory Problems     Diagnosis Date Noted   • No Resolved Ambulatory Problems     Past Medical History:   Diagnosis Date   • Arthritis    • History of anemia    • History of small bowel obstruction    • History of transfusion    • History of ulcer disease    • Kidney stone    • Low back pain    • Mitral valve prolapse    • Renal insufficiency    • Tachycardia    • Tinnitus of both ears    • Uses contact lenses    • UTI (urinary tract infection) 02/22/2017       PAST SURGICAL HISTORY  Past Surgical History:   Procedure Laterality Date   • APPENDECTOMY     • CERVICAL FUSION     • CHOLECYSTECTOMY     • CYSTOSCOPY URETEROSCOPY STONE MANIPULATION/EXTRACTION      X3   • HYSTERECTOMY     • LYSIS OF ABDOMINAL ADHESIONS      MULTIPLE   • ULNAR NERVE TRANSPOSITION Left    • URETEROSCOPY LASER LITHOTRIPSY WITH STENT INSERTION Left 3/1/2017    Procedure: LT URETEROSCOPY LASER LITHOTRIPSY WITH STENT INSERTION AND STONE BASKET EXTRACTION BILATERAL RETROGRADE PYLOGRAM LASER LITHROTRIPSY;  Surgeon: Arnol Hernandez MD;  Location: [facility];  Service:        FAMILY  HISTORY  History reviewed. No pertinent family history.    SOCIAL HISTORY  Social History     Social History   • Marital status:      Spouse name: N/A   • Number of children: N/A   • Years of education: N/A     Occupational History   • Not on file.     Social History Main Topics   • Smoking status: Never Smoker   • Smokeless tobacco: Not on file   • Alcohol use No   • Drug use: No   • Sexual activity: Defer     Other Topics Concern   • Not on file     Social History Narrative   • No narrative on file       ALLERGIES  Codeine; Ambien [zolpidem]; Benadryl [diphenhydramine]; Nsaids; Other; Levaquin [levofloxacin]; Morphine and related; Penicillins; Phenergan [promethazine hcl]; Reglan [metoclopramide]; and Ultram [tramadol]    REVIEW OF SYSTEMS  Review of Systems   Constitutional: Negative for fever.   HENT: Negative for sore throat.    Eyes: Negative.    Respiratory: Negative for cough and shortness of breath.    Cardiovascular: Negative for chest pain.   Gastrointestinal: Positive for abdominal pain (right inguinal) and nausea. Negative for diarrhea and vomiting.   Genitourinary: Positive for frequency and urgency. Negative for dysuria.   Musculoskeletal: Negative for neck pain.   Skin: Negative for rash.   Allergic/Immunologic: Negative.    Neurological: Negative for weakness, numbness and headaches.   Hematological: Negative.    Psychiatric/Behavioral: Negative.    All other systems reviewed and are negative.      PHYSICAL EXAM  ED Triage Vitals   Temp Heart Rate Resp BP SpO2   04/18/18 1455 04/18/18 1455 04/18/18 1455 04/18/18 1506 04/18/18 1455   97.8 °F (36.6 °C) 93 16 122/77 98 %      Temp src Heart Rate Source Patient Position BP Location FiO2 (%)   04/18/18 1455 -- -- -- --   Tympanic           Physical Exam   Constitutional: She is oriented to person, place, and time and well-developed, well-nourished, and in no distress. No distress.   HENT:   Head: Normocephalic and atraumatic.   Eyes: EOM are  normal. Pupils are equal, round, and reactive to light.   Neck: Normal range of motion. Neck supple.   Cardiovascular: Normal rate, regular rhythm and normal heart sounds.    Pulmonary/Chest: Effort normal and breath sounds normal. No respiratory distress.   Abdominal: Soft. There is tenderness (mild) in the suprapubic area. There is no rebound, no guarding and no CVA tenderness.   Musculoskeletal: Normal range of motion. She exhibits no edema.   Neurological: She is alert and oriented to person, place, and time. She has normal sensation and normal strength.   Skin: Skin is warm and dry. No rash noted.   Psychiatric: Mood and affect normal.   Nursing note and vitals reviewed.      LAB RESULTS  Lab Results (last 24 hours)     Procedure Component Value Units Date/Time    CBC & Differential [[id number]] Collected:  04/18/18 1518    Specimen:  Blood Updated:  04/18/18 1530    Narrative:       The following orders were created for panel order CBC & Differential.  Procedure                               Abnormality         Status                     ---------                               -----------         ------                     CBC Auto Differential[[id number]]        Abnormal            Final result                 Please view results for these tests on the individual orders.    Comprehensive Metabolic Panel [[id number]] Collected:  04/18/18 1518    Specimen:  Blood Updated:  04/18/18 1555     Glucose 88 mg/dL      BUN 15 mg/dL      Creatinine 0.80 mg/dL      Sodium 145 mmol/L      Potassium 3.9 mmol/L      Chloride 107 mmol/L      CO2 23.1 mmol/L      Calcium 9.4 mg/dL      Total Protein 7.1 g/dL      Albumin 4.00 g/dL      ALT (SGPT) 12 U/L      AST (SGOT) 13 U/L      Alkaline Phosphatase 88 U/L      Total Bilirubin 0.2 mg/dL      eGFR  African Amer 89 mL/min/1.73      Globulin 3.1 gm/dL      A/G Ratio 1.3 g/dL      BUN/Creatinine Ratio 18.8     Anion Gap 14.9 mmol/L     Lipase [[id number]]  (Normal) Collected:   04/18/18 1518    Specimen:  Blood Updated:  04/18/18 1555     Lipase 39 U/L     Urinalysis With / Culture If Indicated - Urine, Clean Catch [[id number]]  (Abnormal) Collected:  04/18/18 1518    Specimen:  Urine from Urine, Clean Catch Updated:  04/18/18 1536     Color, UA Yellow     Appearance, UA Clear     pH, UA <=5.0     Specific Gravity, UA 1.021     Glucose, UA Negative     Ketones, UA Negative     Bilirubin, UA Negative     Blood, UA Negative     Protein, UA Negative     Leuk Esterase, UA Trace (A)     Nitrite, UA Positive (A)     Urobilinogen, UA 0.2 E.U./dL    CBC Auto Differential [[id number]]  (Abnormal) Collected:  04/18/18 1518    Specimen:  Blood Updated:  04/18/18 1530     WBC 7.38 10*3/mm3      RBC 4.65 10*6/mm3      Hemoglobin 11.5 (L) g/dL      Hematocrit 38.3 %      MCV 82.4 fL      MCH 24.7 (L) pg      MCHC 30.0 (L) g/dL      RDW 15.2 (H) %      RDW-SD 45.4 fl      MPV 10.1 fL      Platelets 289 10*3/mm3      Neutrophil % 55.4 %      Lymphocyte % 37.8 %      Monocyte % 4.9 (L) %      Eosinophil % 1.5 %      Basophil % 0.4 %      Immature Grans % 0.0 %      Neutrophils, Absolute 4.09 10*3/mm3      Lymphocytes, Absolute 2.79 10*3/mm3      Monocytes, Absolute 0.36 10*3/mm3      Eosinophils, Absolute 0.11 10*3/mm3      Basophils, Absolute 0.03 10*3/mm3      Immature Grans, Absolute 0.00 10*3/mm3     Urinalysis, Microscopic Only - Urine, Clean Catch [[id number]]  (Abnormal) Collected:  04/18/18 1518    Specimen:  Urine from Urine, Clean Catch Updated:  04/18/18 1536     RBC, UA 0-2 /HPF      WBC, UA 13-20 (A) /HPF      Bacteria, UA 4+ (A) /HPF      Squamous Epithelial Cells, UA 0-2 /HPF      Hyaline Casts, UA 7-12 /LPF      Methodology Automated Microscopy    Urine Culture - Urine, Urine, Clean Catch [[id number]] Collected:  04/18/18 1518    Specimen:  Urine from Urine, Clean Catch Updated:  04/18/18 1531          I ordered the above labs and reviewed the results    RADIOLOGY  CT Abdomen Pelvis Without  Contrast   Preliminary Result   1. Mild obstructive uropathy on the left from a 6 mm stone at the left   ureterovesical junction.   2. Numerous tiny stones are seen in both kidneys.   3. There is heterogeneous stranding of the retroperitoneal fat posterior   to the pancreas at the level of the upper pole left kidney and this   finding is nonspecific and is also seen on the 08/26/2014 study.    4. Status post cholecystectomy.       Radiation dose reduction techniques were utilized, including automated   exposure control and exposure modulation based on body size.              XR Pyelogram Retrograde    (Results Pending)        I ordered the above noted radiological studies. Interpreted by radiologist. Discussed DR Sin). Reviewed by me in PACS.       PROCEDURES  Procedures      PROGRESS AND CONSULTS  ED Course   Comment By Time   6:05 PM  I spoke with the radiologist Dr. Lopez informs me the patient has a 6 mm distal UVJ stone on the left with mild hydronephrosis. Davdi Shell MD 04/18 1800     1508  Ordered labs and UA for further evaluation.     1608  Ordered IV fluids for hydration.    1612  Ordered CT abd/pelvis for further evaluation    The patient did develop some increased left flank pain and I treated her with some IV morphine and Zofran in the emergency department.  I spoke with Dr. Hill  the urologist and informed him of the patient's clinical condition and results of the test.  The patient will be taken to surgery for stent placement.  The patient is stable.    The patient did develop a mild allergic reaction at the IV site, consistent of mild erythema and swelling after the morphine and Zofran was given and I treated her with Benadryl 25 mg IV.    MEDICAL DECISION MAKING  Results were reviewed/discussed with the patient and they were also made aware of online access. Pt also made aware that some labs, such as cultures, will not be resulted during ER visit and follow up with PMD is  necessary.     MDM  Number of Diagnoses or Management Options     Amount and/or Complexity of Data Reviewed  Clinical lab tests: ordered and reviewed (Hb=11.5, urine positive for nitrites, WBC, UA=13-20, Bacteria, UA=4+.)  Tests in the radiology section of CPT®: ordered and reviewed           DIAGNOSIS  Final diagnoses:   Nephrolithiasis with UTI       DISPOSITION  Admit to surgery    Latest Documented Vital Signs:  As of 7:02 PM  BP- 146/77 HR- 91 Temp- 97.8 °F (36.6 °C) (Tympanic) O2 sat- 99%    --  Documentation assistance provided by lauren Dumont for Dr. Shell.  Information recorded by the scribe was done at my direction and has been verified and validated by me.     Skylar Dumont  04/18/18 1631       David Shell MD  04/18/18 8001

## 2018-04-19 LAB
ANION GAP SERPL CALCULATED.3IONS-SCNC: 12.3 MMOL/L
BUN BLD-MCNC: 11 MG/DL (ref 6–20)
BUN/CREAT SERPL: 18.6 (ref 7–25)
CALCIUM SPEC-SCNC: 9 MG/DL (ref 8.6–10.5)
CHLORIDE SERPL-SCNC: 105 MMOL/L (ref 98–107)
CO2 SERPL-SCNC: 24.7 MMOL/L (ref 22–29)
CREAT BLD-MCNC: 0.59 MG/DL (ref 0.57–1)
DEPRECATED RDW RBC AUTO: 43.9 FL (ref 37–54)
ERYTHROCYTE [DISTWIDTH] IN BLOOD BY AUTOMATED COUNT: 14.8 % (ref 11.7–13)
GFR SERPL CREATININE-BSD FRML MDRD: 127 ML/MIN/1.73
GLUCOSE BLD-MCNC: 120 MG/DL (ref 65–99)
HCT VFR BLD AUTO: 35.7 % (ref 35.6–45.5)
HGB BLD-MCNC: 10.9 G/DL (ref 11.9–15.5)
MCH RBC QN AUTO: 24.7 PG (ref 26.9–32)
MCHC RBC AUTO-ENTMCNC: 30.5 G/DL (ref 32.4–36.3)
MCV RBC AUTO: 80.8 FL (ref 80.5–98.2)
PLATELET # BLD AUTO: 258 10*3/MM3 (ref 140–500)
PMV BLD AUTO: 9.6 FL (ref 6–12)
POTASSIUM BLD-SCNC: 4 MMOL/L (ref 3.5–5.2)
RBC # BLD AUTO: 4.42 10*6/MM3 (ref 3.9–5.2)
SODIUM BLD-SCNC: 142 MMOL/L (ref 136–145)
WBC NRBC COR # BLD: 8.07 10*3/MM3 (ref 4.5–10.7)

## 2018-04-19 PROCEDURE — 25010000002 CEFTRIAXONE PER 250 MG: Performed by: UROLOGY

## 2018-04-19 PROCEDURE — 85027 COMPLETE CBC AUTOMATED: CPT | Performed by: UROLOGY

## 2018-04-19 PROCEDURE — 80048 BASIC METABOLIC PNL TOTAL CA: CPT | Performed by: UROLOGY

## 2018-04-19 PROCEDURE — G0378 HOSPITAL OBSERVATION PER HR: HCPCS

## 2018-04-19 RX ORDER — OXYCODONE AND ACETAMINOPHEN 10; 325 MG/1; MG/1
1 TABLET ORAL EVERY 4 HOURS PRN
Status: DISCONTINUED | OUTPATIENT
Start: 2018-04-19 | End: 2018-04-21 | Stop reason: HOSPADM

## 2018-04-19 RX ORDER — OXYCODONE AND ACETAMINOPHEN 10; 325 MG/1; MG/1
2 TABLET ORAL EVERY 4 HOURS PRN
Status: DISCONTINUED | OUTPATIENT
Start: 2018-04-19 | End: 2018-04-21 | Stop reason: HOSPADM

## 2018-04-19 RX ADMIN — SODIUM CHLORIDE 125 ML/HR: 4.5 INJECTION, SOLUTION INTRAVENOUS at 17:19

## 2018-04-19 RX ADMIN — PANTOPRAZOLE SODIUM 40 MG: 40 TABLET, DELAYED RELEASE ORAL at 07:06

## 2018-04-19 RX ADMIN — SODIUM CHLORIDE 125 ML/HR: 4.5 INJECTION, SOLUTION INTRAVENOUS at 08:37

## 2018-04-19 RX ADMIN — SUCRALFATE 1 G: 1 TABLET ORAL at 21:55

## 2018-04-19 RX ADMIN — OXYCODONE HYDROCHLORIDE AND ACETAMINOPHEN 1 TABLET: 7.5; 325 TABLET ORAL at 00:05

## 2018-04-19 RX ADMIN — OXYCODONE HYDROCHLORIDE AND ACETAMINOPHEN 1 TABLET: 7.5; 325 TABLET ORAL at 12:03

## 2018-04-19 RX ADMIN — OXYCODONE HYDROCHLORIDE AND ACETAMINOPHEN 1 TABLET: 7.5; 325 TABLET ORAL at 08:35

## 2018-04-19 RX ADMIN — ONDANSETRON 4 MG: 4 TABLET, FILM COATED ORAL at 12:03

## 2018-04-19 RX ADMIN — SUCRALFATE 1 G: 1 TABLET ORAL at 17:19

## 2018-04-19 RX ADMIN — OXYCODONE AND ACETAMINOPHEN 1 TABLET: 10; 325 TABLET ORAL at 15:32

## 2018-04-19 RX ADMIN — CEFTRIAXONE SODIUM 1 G: 1 INJECTION, SOLUTION INTRAVENOUS at 17:19

## 2018-04-19 RX ADMIN — OXYCODONE HYDROCHLORIDE AND ACETAMINOPHEN 1 TABLET: 7.5; 325 TABLET ORAL at 04:13

## 2018-04-19 RX ADMIN — SUCRALFATE 1 G: 1 TABLET ORAL at 01:33

## 2018-04-19 RX ADMIN — SODIUM CHLORIDE 125 ML/HR: 4.5 INJECTION, SOLUTION INTRAVENOUS at 00:05

## 2018-04-19 RX ADMIN — DILTIAZEM HYDROCHLORIDE 60 MG: 60 TABLET, FILM COATED ORAL at 21:55

## 2018-04-19 RX ADMIN — SUCRALFATE 1 G: 1 TABLET ORAL at 11:09

## 2018-04-19 RX ADMIN — SUCRALFATE 1 G: 1 TABLET ORAL at 07:06

## 2018-04-19 RX ADMIN — OXYCODONE AND ACETAMINOPHEN 1 TABLET: 10; 325 TABLET ORAL at 20:00

## 2018-04-19 RX ADMIN — DILTIAZEM HYDROCHLORIDE 60 MG: 60 TABLET, FILM COATED ORAL at 08:32

## 2018-04-19 NOTE — ANESTHESIA PREPROCEDURE EVALUATION
Anesthesia Evaluation     Patient summary reviewed and Nursing notes reviewed   no history of anesthetic complications:  NPO Solid Status: > 6 hours  NPO Liquid Status: > 6 hours           Airway   Mallampati: II  TM distance: >3 FB  Neck ROM: full  No difficulty expected  Dental    (+) poor dentition        Pulmonary - negative pulmonary ROS and normal exam    breath sounds clear to auscultation  Cardiovascular - normal exam    Rhythm: regular  Rate: normal    (+) valvular problems/murmurs MVP, dysrhythmias Tachycardia,       Neuro/Psych  GI/Hepatic/Renal/Endo    (+)   renal disease stones,     Musculoskeletal     Abdominal  - normal exam   Substance History - negative use     OB/GYN negative ob/gyn ROS         Other   (+) arthritis                     Anesthesia Plan    ASA 2 - emergent     general     intravenous induction   Anesthetic plan and risks discussed with patient.

## 2018-04-19 NOTE — PLAN OF CARE
Problem: Patient Care Overview  Goal: Plan of Care Review  Outcome: Ongoing (interventions implemented as appropriate)   04/19/18 0323   Coping/Psychosocial   Plan of Care Reviewed With patient   Plan of Care Review   Progress no change   OTHER   Outcome Summary admitted from [facility] alert, c/o lt lower abdominal pain and Lt flank pain, s/p stent placement, post op plan of care initiated, voiding freely, urine is cloudy and blood tinged, on pain and nausea meds, VSS, for further care     Goal: Individualization and Mutuality  Outcome: Ongoing (interventions implemented as appropriate)    Goal: Discharge Needs Assessment  Outcome: Ongoing (interventions implemented as appropriate)      Problem: Infection, Risk/Actual (Adult)  Goal: Identify Related Risk Factors and Signs and Symptoms  Outcome: Ongoing (interventions implemented as appropriate)    Goal: Infection Prevention/Resolution  Outcome: Ongoing (interventions implemented as appropriate)      Problem: Surgery Nonspecified (Adult)  Goal: Signs and Symptoms of Listed Potential Problems Will be Absent, Minimized or Managed (Surgery Nonspecified)  Outcome: Ongoing (interventions implemented as appropriate)    Goal: Anesthesia/Sedation Recovery  Outcome: Outcome(s) achieved Date Met: 04/19/18

## 2018-04-19 NOTE — PLAN OF CARE
Problem: Patient Care Overview  Goal: Plan of Care Review  Outcome: Ongoing (interventions implemented as appropriate)   04/19/18 4991   Coping/Psychosocial   Plan of Care Reviewed With patient   Plan of Care Review   Progress improving   OTHER   Outcome Summary VSS. Med as needed for L flank pain. Voiding freely. Urine is clearing, no more blood .      Goal: Individualization and Mutuality  Outcome: Ongoing (interventions implemented as appropriate)    Goal: Discharge Needs Assessment  Outcome: Ongoing (interventions implemented as appropriate)    Goal: Interprofessional Rounds/Family Conf  Outcome: Ongoing (interventions implemented as appropriate)      Problem: Infection, Risk/Actual (Adult)  Goal: Identify Related Risk Factors and Signs and Symptoms  Outcome: Ongoing (interventions implemented as appropriate)    Goal: Infection Prevention/Resolution  Outcome: Ongoing (interventions implemented as appropriate)      Problem: Surgery Nonspecified (Adult)  Goal: Signs and Symptoms of Listed Potential Problems Will be Absent, Minimized or Managed (Surgery Nonspecified)  Outcome: Ongoing (interventions implemented as appropriate)

## 2018-04-19 NOTE — ANESTHESIA PROCEDURE NOTES
Airway  Airway not difficult    General Information and Staff    Patient location during procedure: OR  Anesthesiologist: KRISTIE GARCIA    Indications and Patient Condition    Preoxygenated: yes  Mask difficulty assessment: 1 - vent by mask    Final Airway Details  Final airway type: supraglottic airway      Successful airway: unique  Size 4    Number of attempts at approach: 1    Additional Comments  Dentition intact

## 2018-04-19 NOTE — PROGRESS NOTES
"[facility] Progress Note    Chief Complaint:  Flank pain    Pain beter but still needing narcs, low grade shills and nausea  No temp  Labs ok    Review of Systems:    All systems were reviewed and negative except for:  Genitourinary: postivie for  blood in urine and frequency          Vital Signs  /53 (BP Location: Right arm, Patient Position: Lying)   Pulse 89   Temp 97.9 °F (36.6 °C) (Oral)   Resp 16   Ht 162.6 cm (64\")   Wt 77.1 kg (170 lb)   SpO2 95%   BMI 29.18 kg/m²     Physical Exam:     General Appearance:    Alert, cooperative, in no acute distress   Head:    Normocephalic, without obvious abnormality, atraumatic   Eyes:            Lids and lashes normal, conjunctivae and sclerae normal, no   icterus, no pallor, corneas clear, PERRLA   Ears:    Ears appear intact with no abnormalities noted   Throat:   No oral lesions, no thrush, oral mucosa moist   Neck:   No adenopathy, supple, trachea midline, no thyromegaly, no     carotid bruit, no JVD   Back:     No kyphosis present, no scoliosis present, no skin lesions,       erythema or scars, no tenderness to percussion or                   palpation,   range of motion normal   Lungs:     Clear to auscultation,respirations regular, even and                   unlabored    Heart:    Regular rhythm and normal rate, normal S1 and S2, no            murmur, no gallop, no rub, no click   Breast Exam:    Deferred   Abdomen:     Normal bowel sounds, no masses, no organomegaly, soft        non-tender, non-distended, no guarding, no rebound                 tenderness   Genitalia:    Deferred   Extremities:   Moves all extremities well, no edema, no cyanosis, no              redness   Pulses:   Pulses palpable and equal bilaterally   Skin:   No bleeding, bruising or rash   Lymph nodes:   No palpable adenopathy   Neurologic:   Cranial nerves 2 - 12 grossly intact, sensation intact, DTR        present and equal bilaterally        Results Review:     I reviewed " the patient's new clinical results.  Lab Results (last 24 hours)     Procedure Component Value Units Date/Time    Urine Culture - Urine, Urine, Clean Catch [[id number]]  (Abnormal) Collected:  04/18/18 1518    Specimen:  Urine from Urine, Clean Catch Updated:  04/19/18 0854     Urine Culture --      >100,000 CFU/mL Gram Negative Bacilli (A)    Basic Metabolic Panel [[id number]]  (Abnormal) Collected:  04/19/18 0519    Specimen:  Blood Updated:  04/19/18 0622     Glucose 120 (H) mg/dL      BUN 11 mg/dL      Creatinine 0.59 mg/dL      Sodium 142 mmol/L      Potassium 4.0 mmol/L      Chloride 105 mmol/L      CO2 24.7 mmol/L      Calcium 9.0 mg/dL      eGFR  African Amer 127 mL/min/1.73      BUN/Creatinine Ratio 18.6     Anion Gap 12.3 mmol/L     Narrative:       GFR Normal >60  Chronic Kidney Disease <60  Kidney Failure <15    CBC (No Diff) [[id number]]  (Abnormal) Collected:  04/19/18 0519    Specimen:  Blood Updated:  04/19/18 0600     WBC 8.07 10*3/mm3      RBC 4.42 10*6/mm3      Hemoglobin 10.9 (L) g/dL      Hematocrit 35.7 %      MCV 80.8 fL      MCH 24.7 (L) pg      MCHC 30.5 (L) g/dL      RDW 14.8 (H) %      RDW-SD 43.9 fl      MPV 9.6 fL      Platelets 258 10*3/mm3         Imaging Results (last 24 hours)     Procedure Component Value Units Date/Time    XR Chest Post CVA Port [[id number]] Collected:  04/18/18 2251     Updated:  04/18/18 2251    Narrative:       X-RAY CHEST 1 VIEW.     HISTORY: Line placement.     COMPARISON: 12/24/2015.     FINDINGS:  Cardiomediastinal silhouette is within normal limits. Tip of the right  jugular line is at the cavoatrial junction.     There is no consolidation or effusion. Low lung volumes.          Impression:       No definite acute findings. Tip of the right jugular line is at the  cavoatrial junction.       FL Retrograde Pyelogram In OR [[id number]] Updated:  04/18/18 2204    CT Abdomen Pelvis Without Contrast [[id number]] Collected:  04/18/18 1817     Updated:  04/18/18 1817     Narrative:       CT OF THE ABDOMEN AND PELVIS WITHOUT CONTRAST 4/18/2018      HISTORY: Suprapubic pain.     TECHNIQUE: Axial images were obtained from the lung bases to the  symphysis pubis. No intravenous or oral contrast was given.     FINDINGS: There are numerous tiny stones in the bilateral kidneys. There  is mild dilatation of the left ureter down to the left ureterovesical  junction where there is an approximately 6 mm stone on image 125.     Gallbladder has been removed. The liver, spleen, pancreas and adrenals  appear unremarkable except for 2 small liver lesions also seen on the  previous study of 8/26/2014 and likely tiny hepatic cysts. There is some  mild haziness of the retroperitoneal fat at the level of the upper pole  left kidney and posterior to the pancreas. This finding is also seen on  the previous study of 8/26/2014.     Ventral hernia containing fat is seen.     No bowel wall thickening or bowel dilatation is seen.       Impression:       1. Mild obstructive uropathy on the left from a 6 mm stone at the left  ureterovesical junction.  2. Numerous tiny stones are seen in both kidneys.  3. There is heterogeneous stranding of the retroperitoneal fat posterior  to the pancreas at the level of the upper pole left kidney and this  finding is nonspecific and is also seen on the 08/26/2014 study.   4. Status post cholecystectomy.     Radiation dose reduction techniques were utilized, including automated  exposure control and exposure modulation based on body size.                Medication Review:   I have personally reviewed    Current Facility-Administered Medications:   •  acetaminophen (TYLENOL) tablet 500 mg, 500 mg, Oral, Q6H PRN, Arnol Dewey MD  •  cefTRIAXone (ROCEPHIN) IVPB 1 g, 1 g, Intravenous, Q24H, Arnol Dewey MD, Last Rate: 100 mL/hr at 04/19/18 1719, 1 g at 04/19/18 1719  •  diltiaZEM (CARDIZEM) tablet 60 mg, 60 mg, Oral, Q12H, Arnol Dewey  MD, 60 mg at 04/19/18 0832  •  ondansetron (ZOFRAN) tablet 4 mg, 4 mg, Oral, Q6H PRN, 4 mg at 04/19/18 1203 **OR** ondansetron ODT (ZOFRAN-ODT) disintegrating tablet 4 mg, 4 mg, Oral, Q6H PRN **OR** ondansetron (ZOFRAN) injection 4 mg, 4 mg, Intravenous, Q6H PRN, Arnol Dewey MD  •  oxyCODONE-acetaminophen (PERCOCET)  MG per tablet 1 tablet, 1 tablet, Oral, Q4H PRN, 1 tablet at 04/19/18 1532 **OR** oxyCODONE-acetaminophen (PERCOCET)  MG per tablet 2 tablet, 2 tablet, Oral, Q4H PRN, Arnol Hernandez MD  •  pantoprazole (PROTONIX) EC tablet 40 mg, 40 mg, Oral, QAM, Arnol Dewey MD, 40 mg at 04/19/18 0706  •  sodium chloride 0.45 % infusion, 125 mL/hr, Intravenous, Continuous, Arnol Dewey MD, Last Rate: 125 mL/hr at 04/19/18 1719, 125 mL/hr at 04/19/18 1719  •  sucralfate (CARAFATE) tablet 1 g, 1 g, Oral, 4x Daily, Arnol Dewey MD, 1 g at 04/19/18 1719    Allergies:    Codeine; Ambien [zolpidem]; Benadryl [diphenhydramine]; Nsaids; Other; Levaquin [levofloxacin]; Morphine and related; Penicillins; Phenergan [promethazine hcl]; Reglan [metoclopramide]; and Ultram [tramadol]    Assessment:    Active Problems:  Patient Active Problem List   Diagnosis   • Ureteral calculus, left   • Nephrolithiasis       Obstructing Ureteral Stone with Pyelo    Plan:    Cont iv abx, pain control, await culture  Home likely tomorrow       Arnol Hernandez MD    4/19/2018  6:13 PM

## 2018-04-19 NOTE — ANESTHESIA PROCEDURE NOTES
Central Line    Patient location during procedure: holding area  Start time: 4/18/2018 9:19 PM  Stop Time:4/18/2018 9:32 PM  Indications: vascular access and central pressure monitoring  Staff  Anesthesiologist: KRISTIE GARCIA  Preanesthetic Checklist  Completed: patient identified and risks and benefits discussed  Central Line Prep  Sterile Tech:cap, gloves, gown, mask and sterile barriers  Prep: chloraprep  Patient monitoring: blood pressure monitoring, continuous pulse oximetry and EKG  Central Line Procedure  Laterality:right  Location:internal jugular  Catheter Type:double lumen  Catheter Size:8 Fr  Guidance:ultrasound guided  PROCEDURE NOTE/ULTRASOUND INTERPRETATION.  Using ultrasound guidance the potential vascular sites for insertion of the catheter were visualized to determine the patency of the vessel to be used for vascular access.  After selecting the appropriate site for insertion, the needle was visualized under ultrasound being inserted into the internal jugular vein, followed by ultrasound confirmation of wire and catheter placement. There were no abnormalities seen on ultrasound; an image was taken; and the patient tolerated the procedure with no complications.   Assessment  Post procedure:biopatch applied, line sutured, occlusive dressing applied and secured with tape  Assessement:blood return through all ports and free fluid flow  Complications:no  Patient Tolerance:patient tolerated the procedure well with no apparent complications  Additional Notes  Ultrasound Interpretation:  Using ultrasound guidance the potential vascular sites for insertion of the catheter were visualized to determine the patency of the vessel to be used for vascular access.  After selecting the appropriate site for insertion, the needle was visualized under ultrasound being inserted into the vessel, followed by ultrasound confirmation of wire and catheter placement.  There were no abnormalities seen on ultrasound; an  image was taken/ and the patient tolerated the procedure with no complications.

## 2018-04-19 NOTE — ANESTHESIA POSTPROCEDURE EVALUATION
Patient: Cherelle Ayala    Procedure Summary     Date:  04/18/18 Room / Location:  [facility] OR 01 / [facility] OR    Anesthesia Start:  2141 Anesthesia Stop:  2209    Procedure:  CYSTOSCOPY URETERAL STENT INSERTION (Left ) Diagnosis:      Surgeon:  Arnol Dewey MD Provider:  Vivek Du MD    Anesthesia Type:  general ASA Status:  2 - Emergent          Anesthesia Type: general  Last vitals  BP   125/84 (04/18/18 2210)   Temp   36.8 °C (98.2 °F) (04/18/18 2206)   Pulse   87 (04/18/18 2210)   Resp   14 (04/18/18 2210)     SpO2   100 % (04/18/18 2210)     Post Anesthesia Care and Evaluation    Patient location during evaluation: bedside  Pain management: adequate  Airway patency: patent  Anesthetic complications: No anesthetic complications    Cardiovascular status: acceptable  Respiratory status: acceptable  Hydration status: acceptable    Comments: Chest x-ray taken and reviewed in PACU -- my interpretation is that there's no evidence of pneumothorax, line terminates in SVC.

## 2018-04-19 NOTE — OP NOTE
Operative Report     [facility] OR    Patient: Cherelle Ayala  Age:      57 y.o.  :     1960  Sex:      female    Medical Record:  [id number]    Date of Operation/Procedure:  2018    Pre-op Diagnosis:   Left ureteral stone, UTI    Post-Op Diagnosis Codes:   same    Pre-operative Diagnosis Free Text:  * No pre-op diagnosis entered *     Name of Operation/Procedure:  Procedure(s) and Anesthesia Type:    Cystoscopy, left ureteral stent placement    Findings/Complications:      Left stent placed, no difficulties  No complications    Description of procedure:     The patient was taken to the OR and placed under GA in lithotomy position.  Prepped and draped in sterile fashion.  The 21 Fr cystoscope was introduced and pan-cystoscopy was performed.  No tumors or stones were seen.  On fluoro, no stone was visible.  The left ureteral orifice was cannulated with a Sensor guidewire which passed into the kidney without difficulty.  I did not feel the wire bump the stone.  A 7 Fr x 24 cm stent was passed over the wire into the proper anatomic position without difficulty.  The tether was not left on the stent.  Patient was awakened and taken to RR in good condition, no complications.    Estimated Blood Loss: none    Specimens: * No orders in the log *    Fluids/Drains: Left ureteral stent    Arnol Dewey MD  2018  8:40 PM

## 2018-04-20 VITALS
RESPIRATION RATE: 18 BRPM | HEIGHT: 64 IN | WEIGHT: 170 LBS | BODY MASS INDEX: 29.02 KG/M2 | TEMPERATURE: 98.1 F | SYSTOLIC BLOOD PRESSURE: 102 MMHG | HEART RATE: 84 BPM | DIASTOLIC BLOOD PRESSURE: 52 MMHG | OXYGEN SATURATION: 92 %

## 2018-04-20 LAB
ANION GAP SERPL CALCULATED.3IONS-SCNC: 10.7 MMOL/L
BACTERIA SPEC AEROBE CULT: ABNORMAL
BACTERIA SPEC AEROBE CULT: ABNORMAL
BUN BLD-MCNC: 12 MG/DL (ref 6–20)
BUN/CREAT SERPL: 18.2 (ref 7–25)
CALCIUM SPEC-SCNC: 8.8 MG/DL (ref 8.6–10.5)
CHLORIDE SERPL-SCNC: 106 MMOL/L (ref 98–107)
CO2 SERPL-SCNC: 26.3 MMOL/L (ref 22–29)
CREAT BLD-MCNC: 0.66 MG/DL (ref 0.57–1)
DEPRECATED RDW RBC AUTO: 45.5 FL (ref 37–54)
ERYTHROCYTE [DISTWIDTH] IN BLOOD BY AUTOMATED COUNT: 15.2 % (ref 11.7–13)
GFR SERPL CREATININE-BSD FRML MDRD: 111 ML/MIN/1.73
GLUCOSE BLD-MCNC: 98 MG/DL (ref 65–99)
HCT VFR BLD AUTO: 32 % (ref 35.6–45.5)
HGB BLD-MCNC: 9.8 G/DL (ref 11.9–15.5)
MCH RBC QN AUTO: 25.1 PG (ref 26.9–32)
MCHC RBC AUTO-ENTMCNC: 30.6 G/DL (ref 32.4–36.3)
MCV RBC AUTO: 81.8 FL (ref 80.5–98.2)
PLATELET # BLD AUTO: 236 10*3/MM3 (ref 140–500)
PMV BLD AUTO: 9.8 FL (ref 6–12)
POTASSIUM BLD-SCNC: 3.4 MMOL/L (ref 3.5–5.2)
RBC # BLD AUTO: 3.91 10*6/MM3 (ref 3.9–5.2)
SODIUM BLD-SCNC: 143 MMOL/L (ref 136–145)
WBC NRBC COR # BLD: 8.2 10*3/MM3 (ref 4.5–10.7)

## 2018-04-20 PROCEDURE — 85027 COMPLETE CBC AUTOMATED: CPT | Performed by: UROLOGY

## 2018-04-20 PROCEDURE — G0378 HOSPITAL OBSERVATION PER HR: HCPCS

## 2018-04-20 PROCEDURE — 25010000002 CEFTRIAXONE PER 250 MG: Performed by: UROLOGY

## 2018-04-20 PROCEDURE — 80048 BASIC METABOLIC PNL TOTAL CA: CPT | Performed by: UROLOGY

## 2018-04-20 RX ORDER — NITROFURANTOIN 25; 75 MG/1; MG/1
100 CAPSULE ORAL 2 TIMES DAILY
Qty: 28 CAPSULE | Refills: 0 | Status: SHIPPED | OUTPATIENT
Start: 2018-04-20

## 2018-04-20 RX ORDER — OXYCODONE AND ACETAMINOPHEN 10; 325 MG/1; MG/1
1 TABLET ORAL EVERY 4 HOURS PRN
Qty: 40 TABLET | Refills: 0 | Status: SHIPPED | OUTPATIENT
Start: 2018-04-20 | End: 2018-04-29

## 2018-04-20 RX ADMIN — SUCRALFATE 1 G: 1 TABLET ORAL at 17:35

## 2018-04-20 RX ADMIN — PANTOPRAZOLE SODIUM 40 MG: 40 TABLET, DELAYED RELEASE ORAL at 06:58

## 2018-04-20 RX ADMIN — OXYCODONE AND ACETAMINOPHEN 2 TABLET: 10; 325 TABLET ORAL at 17:36

## 2018-04-20 RX ADMIN — SUCRALFATE 1 G: 1 TABLET ORAL at 13:05

## 2018-04-20 RX ADMIN — OXYCODONE AND ACETAMINOPHEN 2 TABLET: 10; 325 TABLET ORAL at 13:04

## 2018-04-20 RX ADMIN — SODIUM CHLORIDE 125 ML/HR: 4.5 INJECTION, SOLUTION INTRAVENOUS at 09:09

## 2018-04-20 RX ADMIN — OXYCODONE AND ACETAMINOPHEN 2 TABLET: 10; 325 TABLET ORAL at 05:18

## 2018-04-20 RX ADMIN — DILTIAZEM HYDROCHLORIDE 60 MG: 60 TABLET, FILM COATED ORAL at 09:07

## 2018-04-20 RX ADMIN — OXYCODONE AND ACETAMINOPHEN 2 TABLET: 10; 325 TABLET ORAL at 09:07

## 2018-04-20 RX ADMIN — SODIUM CHLORIDE 125 ML/HR: 4.5 INJECTION, SOLUTION INTRAVENOUS at 01:22

## 2018-04-20 RX ADMIN — CEFTRIAXONE SODIUM 1 G: 1 INJECTION, SOLUTION INTRAVENOUS at 16:32

## 2018-04-20 RX ADMIN — OXYCODONE AND ACETAMINOPHEN 2 TABLET: 10; 325 TABLET ORAL at 00:18

## 2018-04-20 RX ADMIN — SUCRALFATE 1 G: 1 TABLET ORAL at 07:00

## 2018-04-20 NOTE — PLAN OF CARE
Problem: Patient Care Overview  Goal: Plan of Care Review  Outcome: Ongoing (interventions implemented as appropriate)   04/20/18 1800   Coping/Psychosocial   Plan of Care Reviewed With patient   Plan of Care Review   Progress improving   OTHER   Outcome Summary vss, pain meds q 4 hrs, voiding well ,sukhdev diet well     Goal: Individualization and Mutuality  Outcome: Ongoing (interventions implemented as appropriate)    Goal: Discharge Needs Assessment  Outcome: Ongoing (interventions implemented as appropriate)    Goal: Interprofessional Rounds/Family Conf  Outcome: Ongoing (interventions implemented as appropriate)

## 2018-04-20 NOTE — PLAN OF CARE
Problem: Patient Care Overview  Goal: Individualization and Mutuality  Outcome: Ongoing (interventions implemented as appropriate)    Goal: Discharge Needs Assessment  Outcome: Ongoing (interventions implemented as appropriate)    Goal: Interprofessional Rounds/Family Conf  Outcome: Ongoing (interventions implemented as appropriate)      Problem: Infection, Risk/Actual (Adult)  Goal: Infection Prevention/Resolution  Outcome: Ongoing (interventions implemented as appropriate)      Problem: Surgery Nonspecified (Adult)  Goal: Signs and Symptoms of Listed Potential Problems Will be Absent, Minimized or Managed (Surgery Nonspecified)  Outcome: Ongoing (interventions implemented as appropriate)

## 2018-04-20 NOTE — PLAN OF CARE
Problem: Patient Care Overview  Goal: Plan of Care Review  Outcome: Ongoing (interventions implemented as appropriate)   04/20/18 0416   Coping/Psychosocial   Plan of Care Reviewed With patient   Plan of Care Review   Progress improving   OTHER   Outcome Summary adequate pain control, voiding freely urine clear and yellow in color, up ad quinn, VSS, possible home today     Goal: Individualization and Mutuality  Outcome: Ongoing (interventions implemented as appropriate)    Goal: Discharge Needs Assessment  Outcome: Ongoing (interventions implemented as appropriate)      Problem: Infection, Risk/Actual (Adult)  Goal: Identify Related Risk Factors and Signs and Symptoms  Outcome: Outcome(s) achieved Date Met: 04/20/18    Goal: Infection Prevention/Resolution  Outcome: Ongoing (interventions implemented as appropriate)      Problem: Surgery Nonspecified (Adult)  Goal: Signs and Symptoms of Listed Potential Problems Will be Absent, Minimized or Managed (Surgery Nonspecified)  Outcome: Ongoing (interventions implemented as appropriate)

## 2018-04-21 NOTE — DISCHARGE SUMMARY
Date of Discharge:  4/20/2018    Discharge Diagnosis: good    Presenting Problem/History of Present Illness  Nephrolithiasis [N20.0]  Nephrolithiasis [N20.0]     58yo CF with hisotyr of stone sna dnow pyelo  Hospital Course  Patient is a 57 y.o. female presented with obstructing pyelo, stent placed and cultures grew ecoli.Imprvoed with stent and abx..      Procedures Performed  Procedure(s):  CYSTOSCOPY URETERAL STENT INSERTION       Consults:   Consults     Date and Time Order Name Status Description    4/18/2018 1806 Urology (on-call MD unless specified) Completed           Pertinent Test Results: microbiology: urine culture: positive      Condition on Discharge:  good    Vital Signs  Temp:  [97.6 °F (36.4 °C)-98.6 °F (37 °C)] 98.1 °F (36.7 °C)  Heart Rate:  [76-87] 84  Resp:  [16-18] 18  BP: ()/(52-94) 102/52    Physical Exam:     General Appearance:    Alert, cooperative, in no acute distress   Head:    Normocephalic, without obvious abnormality, atraumatic   Eyes:            Lids and lashes normal, conjunctivae and sclerae normal, no   icterus, no pallor, corneas clear, PERRLA   Ears:    Ears appear intact with no abnormalities noted   Throat:   No oral lesions, no thrush, oral mucosa moist   Neck:   No adenopathy, supple, trachea midline, no thyromegaly, no     carotid bruit, no JVD   Back:     No kyphosis present, no scoliosis present, no skin lesions,       erythema or scars, no tenderness to percussion or                   palpation,   range of motion normal   Lungs:     Clear to auscultation,respirations regular, even and                   unlabored    Heart:    Regular rhythm and normal rate, normal S1 and S2, no            murmur, no gallop, no rub, no click   Breast Exam:    Deferred   Abdomen:     Normal bowel sounds, no masses, no organomegaly, soft        non-tender, non-distended, no guarding, no rebound                 tenderness   Genitalia:    Deferred   Extremities:   Moves all  extremities well, no edema, no cyanosis, no              redness   Pulses:   Pulses palpable and equal bilaterally   Skin:   No bleeding, bruising or rash   Lymph nodes:   No palpable adenopathy   Neurologic:   Cranial nerves 2 - 12 grossly intact, sensation intact, DTR        present and equal bilaterally       Discharge Disposition  Home or Self Care    Discharge Medications   Cherelle Ayala   Home Medication Instructions JAN:[id number]    Printed on:04/20/18 2011   Medication Information                      acetaminophen (TYLENOL) 500 MG tablet  Take 500 mg by mouth Every 6 (Six) Hours As Needed for mild pain (1-3).             diltiaZEM (CARDIZEM) 60 MG tablet  Take 60 mg by mouth 2 (Two) Times a Day.             nitrofurantoin, macrocrystal-monohydrate, (MACROBID) 100 MG capsule  Take 1 capsule by mouth 2 (Two) Times a Day.             omeprazole (priLOSEC) 40 MG capsule  Take 40 mg by mouth 2 (Two) Times a Day.             oxyCODONE-acetaminophen (PERCOCET)  MG per tablet  Take 1 tablet by mouth Every 4 (Four) Hours As Needed for Moderate Pain  for up to 9 days.             oxyCODONE-acetaminophen (PERCOCET) 7.5-325 MG per tablet  Take 1-2 tablets by mouth Every 4 (Four) Hours As Needed (Pain).             sucralfate (CARAFATE) 1 g tablet  Take 1 g by mouth 4 (Four) Times a Day.                 Discharge Diet: as sukhdev    Activity at Discharge: as sukhdev    Follow-up Appointments  No future appointments.  Additional Instructions for the Follow-ups that You Need to Schedule     Discharge Follow-up with Specified Provider: angeline dupont; 1 Week    As directed      To:  angeline dupont    Follow Up:  1 Week    Follow Up Details:  CT scan in office on followup visit               Test Results Pending at Discharge       Arnol Dupont MD  04/20/18  8:11 PM    Time: Discharge 15 min

## 2018-04-23 ENCOUNTER — TRANSCRIBE ORDERS (OUTPATIENT)
Dept: ADMINISTRATIVE | Facility: HOSPITAL | Age: 58
End: 2018-04-23

## 2018-04-23 DIAGNOSIS — N23 RENAL COLIC: Primary | ICD-10-CM

## 2018-04-30 ENCOUNTER — APPOINTMENT (OUTPATIENT)
Dept: CT IMAGING | Facility: HOSPITAL | Age: 58
End: 2018-04-30
Attending: UROLOGY

## 2020-02-24 ENCOUNTER — OFFICE VISIT (OUTPATIENT)
Dept: CARDIOLOGY | Facility: CLINIC | Age: 60
End: 2020-02-24

## 2020-02-24 VITALS
SYSTOLIC BLOOD PRESSURE: 106 MMHG | HEART RATE: 72 BPM | DIASTOLIC BLOOD PRESSURE: 70 MMHG | HEIGHT: 64 IN | BODY MASS INDEX: 31.76 KG/M2 | WEIGHT: 186 LBS

## 2020-02-24 DIAGNOSIS — I10 ESSENTIAL HYPERTENSION: ICD-10-CM

## 2020-02-24 DIAGNOSIS — R07.2 PRECORDIAL PAIN: Primary | ICD-10-CM

## 2020-02-24 DIAGNOSIS — E66.01 MORBID OBESITY WITH BMI OF 40.0-44.9, ADULT (HCC): ICD-10-CM

## 2020-02-24 DIAGNOSIS — R00.2 PALPITATIONS: ICD-10-CM

## 2020-02-24 DIAGNOSIS — R94.31 ABNORMAL EKG: ICD-10-CM

## 2020-02-24 PROCEDURE — 93000 ELECTROCARDIOGRAM COMPLETE: CPT | Performed by: INTERNAL MEDICINE

## 2020-02-24 PROCEDURE — 99203 OFFICE O/P NEW LOW 30 MIN: CPT | Performed by: INTERNAL MEDICINE

## 2020-02-24 RX ORDER — ERGOCALCIFEROL 1.25 MG/1
CAPSULE ORAL 2 TIMES WEEKLY
COMMUNITY
Start: 2020-02-14 | End: 2020-10-27

## 2020-02-24 RX ORDER — HYDROCODONE BITARTRATE AND ACETAMINOPHEN 5; 325 MG/1; MG/1
1 TABLET ORAL EVERY 6 HOURS PRN
COMMUNITY
Start: 2020-02-17

## 2020-02-24 RX ORDER — ROPINIROLE 2 MG/1
2 TABLET, FILM COATED, EXTENDED RELEASE ORAL NIGHTLY
COMMUNITY
Start: 2020-02-14

## 2020-02-24 RX ORDER — HYDROCHLOROTHIAZIDE 12.5 MG/1
TABLET ORAL AS NEEDED
COMMUNITY
Start: 2020-01-24 | End: 2020-03-04 | Stop reason: ALTCHOICE

## 2020-02-24 RX ORDER — TRAZODONE HYDROCHLORIDE 50 MG/1
TABLET ORAL
COMMUNITY
Start: 2020-02-14 | End: 2020-03-09 | Stop reason: HOSPADM

## 2020-02-24 RX ORDER — ATENOLOL 50 MG/1
50 TABLET ORAL 2 TIMES DAILY
COMMUNITY
Start: 2020-02-14 | End: 2020-12-14 | Stop reason: ALTCHOICE

## 2020-02-24 NOTE — PROGRESS NOTES
Subjective:        [facility]  Cardiology Consult Note    Patient Identification:  Name: Cherelle Ayala  Age: 59 y.o.  Sex: female  :  1960  MRN: [id number]             CC  MINOR CP, FLIPPING HEART  ESPECIALLY ON LT SIDE  SOB ON EXCERTION    STRONG F/H    History of Present Illness:   59-year-old female here for the cardiac evaluation as well as establishment of the care, has been complaining of minor chest pain retrosternal at rest sudden onset associated with flipping of the heart especially on the left side, also complains of shortness of breath on exertion    Cherelle Ayala has been complaining of the shortness of breath mild-to-moderate in intensity with mild-to-moderate usually relieved with rest associated with anxiety and fatigue      Comorbid cardiac risk factors:     Past Medical History:  Past Medical History:   Diagnosis Date   • Arthritis    • Heart murmur    • History of anemia    • History of small bowel obstruction     D/T ADHESIONS, HAD MULTIPLE SURGERIES (5)   • History of transfusion    • History of ulcer disease     WITH GI BLEED AND TRANSFUSION   • Hyperlipidemia    • Kidney stone    • Low back pain    • Mitral valve prolapse    • Renal insufficiency    • Tachycardia     FAMILY MD MANAGES, PT DENIES AFIB/FLUTTER HX   • Tinnitus of both ears    • Uses contact lenses    • UTI (urinary tract infection) 2017    FINISHED ALL ABX, BACTRIM 17     Past Surgical History:  Past Surgical History:   Procedure Laterality Date   • APPENDECTOMY     • CERVICAL FUSION     • CHOLECYSTECTOMY     • CYSTOSCOPY URETEROSCOPY STONE MANIPULATION/EXTRACTION      X3   • CYSTOSCOPY W/ URETERAL STENT PLACEMENT Left 2018    Procedure: CYSTOSCOPY URETERAL STENT INSERTION;  Surgeon: Arnol Dewey MD;  Location: [facility];  Service: Urology   • HYSTERECTOMY     • LYSIS OF ABDOMINAL ADHESIONS      MULTIPLE   • ROTATOR CUFF REPAIR     • ULNAR NERVE TRANSPOSITION  "Left    • URETEROSCOPY LASER LITHOTRIPSY WITH STENT INSERTION Left 3/1/2017    Procedure: LT URETEROSCOPY LASER LITHOTRIPSY WITH STENT INSERTION AND STONE BASKET EXTRACTION BILATERAL RETROGRADE PYLOGRAM LASER LITHROTRIPSY;  Surgeon: Arnol Hernandez MD;  Location: [facility] OR;  Service:       Allergies:  Allergies   Allergen Reactions   • Codeine Rash   • Ambien [Zolpidem]      EXCESSIVE SLEEPINESS   • Benadryl [Diphenhydramine] Other (See Comments)     \"restless arms and legs\"   • Nsaids Other (See Comments)     KIDNEY PROBLEMS   • Other      MUSCLE RELAXERS CAUSE RESTLESSNESS   • Levaquin [Levofloxacin] Hives   • Morphine And Related Rash   • Penicillins Rash   • Phenergan [Promethazine Hcl] Anxiety   • Reglan [Metoclopramide] Anxiety   • Ultram [Tramadol] Rash     Home Meds:    (Not in a hospital admission)  Current Meds:   [unfilled]  Social History:   Social History     Tobacco Use   • Smoking status: Never Smoker   • Smokeless tobacco: Never Used   Substance Use Topics   • Alcohol use: No      Family History:  Family History   Problem Relation Age of Onset   • Heart failure Mother    • Heart disease Mother    • Heart attack Mother    • Hyperlipidemia Father    • Heart failure Father    • Heart disease Father    • Hypertension Sister    • Hyperlipidemia Sister    • Hypertension Brother    • Hyperlipidemia Brother    • Hypertension Brother         Review of Systems    Constitutional: No weakness,fatigue, fever, rigors, chills   Eyes: No vision changes, eye pain   ENT/oropharynx: No difficulty swallowing, sore throat, epistaxis, changes in hearing   Cardiovascular:  Chest pain   Respiratory:  Moderate shortness of breath, dyspnea on exertion, cough, wheezing hemoptysis   Gastrointestinal: No abdominal pain, nausea, vomiting, diarrhea, bloody stools   Genitourinary: No hematuria, dysuria   Neurological: No headache, tremors, numbness,  one-sided weakness    Musculoskeletal: No cramps, myalgias,  joint pain, " joint swelling   Integument: No rash, edema           Constitutional:  Heart Rate:  [72] 72  BP: (106)/(70) 106/70    Physical Exam   General:  Appears in no acute distress  Eyes: PERTL,  HEENT:  No JVD. Thyroid not visibly enlarged. No mucosal pallor or cyanosis  Respiratory: Respirations regular and unlabored at rest. BBS with good air entry in all fields. No crackles, rubs or wheezes auscultated  Cardiovascular: S1S2 Regular rate and rhythm. No murmur, rub or gallop auscultated. No carotid bruits. DP/PT pulses    . No pretibial pitting edema  Gastrointestinal: Abdomen soft, flat, non tender. Bowel sounds present. No hepatosplenomegaly. No ascites  Musculoskeletal: MELO x4. No abnormal movements  Extremities: No digital clubbing or cyanosis  Skin: Color pink. Skin warm and dry to touch. No rashes  No xanthoma  Neuro: AAO x3 CN II-XII grossly intact            ECG 12 Lead  Date/Time: 2/24/2020 11:51 AM  Performed by: Kilo Cook MD  Authorized by: Kilo Cook MD   Comparison: compared with previous ECG   Similar to previous ECG  Rhythm: sinus rhythm  ST Flattening: all    Clinical impression: non-specific ECG                Cardiographics  ECG:     Telemetry:    Echocardiogram:     Imaging  Chest X-ray:     Lab Review               @[email or web address]@              Assessment:/ Recommendations / Plan:   Patient Active Problem List   Diagnosis   • Ureteral calculus, left   • Nephrolithiasis                    ICD-10-CM ICD-9-CM   1. Precordial pain R07.2 786.51   2. Palpitations R00.2 785.1   3. Essential hypertension I10 401.9   4. Morbid obesity with BMI of 40.0-44.9, adult (CMS/[facility]) E66.01 278.01    Z68.41 V85.41   5. Abnormal EKG R94.31 794.31     1. Precordial pain  Considering the patient's symptoms as well as clinical situation and  EKG findings, along with cardiac risk factors, ischemic workup is necessary to rule out ischemic cardiomyopathy, stress induced arrhythmias, and functional capacity  for diagnosis as well as prognostic consideration    - Stress Test With Myocardial Perfusion One Day  - Adult Transthoracic Echo Complete W/ Cont if Necessary Per Protocol    2. Palpitations  Considering patient's medical condition as well as the risk factors, patient will require echocardiogram for further evaluation for the LV function, four-chamber evaluation, including the pressures, valvular function and  pericardial disease and pericardial effusion    - Stress Test With Myocardial Perfusion One Day  - Adult Transthoracic Echo Complete W/ Cont if Necessary Per Protocol    3. Essential hypertension  Blood pressure under control  - Stress Test With Myocardial Perfusion One Day  - Adult Transthoracic Echo Complete W/ Cont if Necessary Per Protocol    4. Morbid obesity with BMI of 40.0-44.9, adult (CMS/[facility])  Counseling has been done  - Stress Test With Myocardial Perfusion One Day  - Adult Transthoracic Echo Complete W/ Cont if Necessary Per Protocol    5. Abnormal EKG  Considering the patient's symptoms as well as clinical situation and  EKG findings, along with cardiac risk factors, ischemic workup is necessary to rule out ischemic cardiomyopathy, stress induced arrhythmias, and functional capacity for diagnosis as well as prognostic consideration    - Stress Test With Myocardial Perfusion One Day  - Adult Transthoracic Echo Complete W/ Cont if Necessary Per Protocol       LEXISCAN, ECHO    GET LIPID RESULTS FROM PCP    Labs/tests ordered for am      Kilo Cook MD  2/24/2020, 11:50 AM      EMR Dragon/Transcription:   Dictated utilizing Dragon dictation

## 2020-03-04 ENCOUNTER — HOSPITAL ENCOUNTER (OUTPATIENT)
Dept: CARDIOLOGY | Facility: HOSPITAL | Age: 60
Discharge: HOME | End: 2020-03-04

## 2020-03-04 ENCOUNTER — HOSPITAL ENCOUNTER (OUTPATIENT)
Dept: CARDIOLOGY | Facility: HOSPITAL | Age: 60
Discharge: HOME | End: 2020-03-04
Admitting: INTERNAL MEDICINE

## 2020-03-04 VITALS — WEIGHT: 186 LBS | BODY MASS INDEX: 31.76 KG/M2 | HEIGHT: 64 IN

## 2020-03-04 VITALS
HEIGHT: 64 IN | BODY MASS INDEX: 31.76 KG/M2 | HEART RATE: 66 BPM | DIASTOLIC BLOOD PRESSURE: 59 MMHG | SYSTOLIC BLOOD PRESSURE: 116 MMHG | WEIGHT: 186 LBS

## 2020-03-04 LAB
BH CV ECHO MEAS - ACS: 1.7 CM
BH CV ECHO MEAS - AO MAX PG (FULL): 4.3 MMHG
BH CV ECHO MEAS - AO MAX PG: 11.6 MMHG
BH CV ECHO MEAS - AO MEAN PG (FULL): 2 MMHG
BH CV ECHO MEAS - AO MEAN PG: 5 MMHG
BH CV ECHO MEAS - AO ROOT AREA (BSA CORRECTED): 1.4
BH CV ECHO MEAS - AO ROOT AREA: 5.3 CM^2
BH CV ECHO MEAS - AO ROOT DIAM: 2.6 CM
BH CV ECHO MEAS - AO V2 MAX: 170 CM/SEC
BH CV ECHO MEAS - AO V2 MEAN: 107 CM/SEC
BH CV ECHO MEAS - AO V2 VTI: 36.6 CM
BH CV ECHO MEAS - ASC AORTA: 2.7 CM
BH CV ECHO MEAS - AVA(I,A): 2.6 CM^2
BH CV ECHO MEAS - AVA(I,D): 2.6 CM^2
BH CV ECHO MEAS - AVA(V,A): 2.5 CM^2
BH CV ECHO MEAS - AVA(V,D): 2.5 CM^2
BH CV ECHO MEAS - BSA(HAYCOCK): 2 M^2
BH CV ECHO MEAS - BSA: 1.9 M^2
BH CV ECHO MEAS - BZI_BMI: 31.9 KILOGRAMS/M^2
BH CV ECHO MEAS - BZI_METRIC_HEIGHT: 162.6 CM
BH CV ECHO MEAS - BZI_METRIC_WEIGHT: 84.4 KG
BH CV ECHO MEAS - EDV(CUBED): 91.1 ML
BH CV ECHO MEAS - EDV(MOD-SP2): 86 ML
BH CV ECHO MEAS - EDV(MOD-SP4): 91 ML
BH CV ECHO MEAS - EDV(TEICH): 92.4 ML
BH CV ECHO MEAS - EF(CUBED): 70.4 %
BH CV ECHO MEAS - EF(MOD-BP): 66 %
BH CV ECHO MEAS - EF(MOD-SP2): 67.4 %
BH CV ECHO MEAS - EF(MOD-SP4): 68.1 %
BH CV ECHO MEAS - EF(TEICH): 62.1 %
BH CV ECHO MEAS - ESV(CUBED): 27 ML
BH CV ECHO MEAS - ESV(MOD-SP2): 28 ML
BH CV ECHO MEAS - ESV(MOD-SP4): 29 ML
BH CV ECHO MEAS - ESV(TEICH): 35 ML
BH CV ECHO MEAS - FS: 33.3 %
BH CV ECHO MEAS - IVS/LVPW: 0.82
BH CV ECHO MEAS - IVSD: 0.9 CM
BH CV ECHO MEAS - LA DIMENSION: 4.2 CM
BH CV ECHO MEAS - LA/AO: 1.6
BH CV ECHO MEAS - LAT PEAK E' VEL: 13.5 CM/SEC
BH CV ECHO MEAS - LV DIASTOLIC VOL/BSA (35-75): 48 ML/M^2
BH CV ECHO MEAS - LV MASS(C)D: 153.3 GRAMS
BH CV ECHO MEAS - LV MASS(C)DI: 80.8 GRAMS/M^2
BH CV ECHO MEAS - LV MAX PG: 7.3 MMHG
BH CV ECHO MEAS - LV MEAN PG: 3 MMHG
BH CV ECHO MEAS - LV SYSTOLIC VOL/BSA (12-30): 15.3 ML/M^2
BH CV ECHO MEAS - LV V1 MAX: 135 CM/SEC
BH CV ECHO MEAS - LV V1 MEAN: 85 CM/SEC
BH CV ECHO MEAS - LV V1 VTI: 29.9 CM
BH CV ECHO MEAS - LVIDD: 4.5 CM
BH CV ECHO MEAS - LVIDS: 3 CM
BH CV ECHO MEAS - LVLD AP2: 8 CM
BH CV ECHO MEAS - LVLD AP4: 8.2 CM
BH CV ECHO MEAS - LVLS AP2: 7.2 CM
BH CV ECHO MEAS - LVLS AP4: 6.5 CM
BH CV ECHO MEAS - LVOT AREA (M): 3.1 CM^2
BH CV ECHO MEAS - LVOT AREA: 3.1 CM^2
BH CV ECHO MEAS - LVOT DIAM: 2 CM
BH CV ECHO MEAS - LVPWD: 1.1 CM
BH CV ECHO MEAS - MED PEAK E' VEL: 10.8 CM/SEC
BH CV ECHO MEAS - MR MAX PG: 119.2 MMHG
BH CV ECHO MEAS - MR MAX VEL: 546 CM/SEC
BH CV ECHO MEAS - MV A DUR: 0.14 SEC
BH CV ECHO MEAS - MV A MAX VEL: 90.7 CM/SEC
BH CV ECHO MEAS - MV DEC SLOPE: 351 CM/SEC^2
BH CV ECHO MEAS - MV DEC TIME: 0.21 SEC
BH CV ECHO MEAS - MV E MAX VEL: 108 CM/SEC
BH CV ECHO MEAS - MV E/A: 1.2
BH CV ECHO MEAS - MV MAX PG: 4.2 MMHG
BH CV ECHO MEAS - MV MEAN PG: 2 MMHG
BH CV ECHO MEAS - MV P1/2T MAX VEL: 106 CM/SEC
BH CV ECHO MEAS - MV P1/2T: 88.5 MSEC
BH CV ECHO MEAS - MV V2 MAX: 103 CM/SEC
BH CV ECHO MEAS - MV V2 MEAN: 58.8 CM/SEC
BH CV ECHO MEAS - MV V2 VTI: 31.5 CM
BH CV ECHO MEAS - MVA P1/2T LCG: 2.1 CM^2
BH CV ECHO MEAS - MVA(P1/2T): 2.5 CM^2
BH CV ECHO MEAS - MVA(VTI): 3 CM^2
BH CV ECHO MEAS - PA ACC TIME: 0.14 SEC
BH CV ECHO MEAS - PA MAX PG (FULL): 2.7 MMHG
BH CV ECHO MEAS - PA MAX PG: 5.6 MMHG
BH CV ECHO MEAS - PA PR(ACCEL): 17.4 MMHG
BH CV ECHO MEAS - PA V2 MAX: 118 CM/SEC
BH CV ECHO MEAS - PULM A REVS DUR: 0.15 SEC
BH CV ECHO MEAS - PULM A REVS VEL: 37.2 CM/SEC
BH CV ECHO MEAS - PULM DIAS VEL: 42.2 CM/SEC
BH CV ECHO MEAS - PULM S/D: 1.1
BH CV ECHO MEAS - PULM SYS VEL: 44.5 CM/SEC
BH CV ECHO MEAS - PVA(V,A): 2 CM^2
BH CV ECHO MEAS - PVA(V,D): 2 CM^2
BH CV ECHO MEAS - QP/QS: 0.69
BH CV ECHO MEAS - RAP SYSTOLE: 3 MMHG
BH CV ECHO MEAS - RV MAX PG: 2.8 MMHG
BH CV ECHO MEAS - RV MEAN PG: 2 MMHG
BH CV ECHO MEAS - RV V1 MAX: 84.2 CM/SEC
BH CV ECHO MEAS - RV V1 MEAN: 58.5 CM/SEC
BH CV ECHO MEAS - RV V1 VTI: 23 CM
BH CV ECHO MEAS - RVOT AREA: 2.8 CM^2
BH CV ECHO MEAS - RVOT DIAM: 1.9 CM
BH CV ECHO MEAS - RVSP: 34.6 MMHG
BH CV ECHO MEAS - SI(AO): 102.5 ML/M^2
BH CV ECHO MEAS - SI(CUBED): 33.8 ML/M^2
BH CV ECHO MEAS - SI(LVOT): 49.5 ML/M^2
BH CV ECHO MEAS - SI(MOD-SP2): 30.6 ML/M^2
BH CV ECHO MEAS - SI(MOD-SP4): 32.7 ML/M^2
BH CV ECHO MEAS - SI(TEICH): 30.3 ML/M^2
BH CV ECHO MEAS - SV(AO): 194.3 ML
BH CV ECHO MEAS - SV(CUBED): 64.1 ML
BH CV ECHO MEAS - SV(LVOT): 93.9 ML
BH CV ECHO MEAS - SV(MOD-SP2): 58 ML
BH CV ECHO MEAS - SV(MOD-SP4): 62 ML
BH CV ECHO MEAS - SV(RVOT): 65.2 ML
BH CV ECHO MEAS - SV(TEICH): 57.4 ML
BH CV ECHO MEAS - TAPSE (>1.6): 2.1 CM
BH CV ECHO MEAS - TR MAX VEL: 281 CM/SEC
BH CV ECHO MEASUREMENTS AVERAGE E/E' RATIO: 8.89
BH CV XLRA - RV BASE: 3.1 CM
BH CV XLRA - RV LENGTH: 6.2 CM
BH CV XLRA - RV MID: 2.3 CM
BH CV XLRA - TDI S': 11.6 CM/SEC
LEFT ATRIUM VOLUME INDEX: 31 ML/M2
MAXIMAL PREDICTED HEART RATE: 160 BPM
STRESS TARGET HR: 136 BPM

## 2020-03-04 PROCEDURE — 93017 CV STRESS TEST TRACING ONLY: CPT

## 2020-03-04 PROCEDURE — 93018 CV STRESS TEST I&R ONLY: CPT | Performed by: INTERNAL MEDICINE

## 2020-03-04 PROCEDURE — 0 TECHNETIUM SESTAMIBI: Performed by: INTERNAL MEDICINE

## 2020-03-04 PROCEDURE — 93306 TTE W/DOPPLER COMPLETE: CPT

## 2020-03-04 PROCEDURE — 78452 HT MUSCLE IMAGE SPECT MULT: CPT | Performed by: INTERNAL MEDICINE

## 2020-03-04 PROCEDURE — 25010000002 REGADENOSON 0.4 MG/5ML SOLUTION: Performed by: INTERNAL MEDICINE

## 2020-03-04 PROCEDURE — 93016 CV STRESS TEST SUPVJ ONLY: CPT | Performed by: NURSE PRACTITIONER

## 2020-03-04 PROCEDURE — 78452 HT MUSCLE IMAGE SPECT MULT: CPT

## 2020-03-04 PROCEDURE — 25010000002 PERFLUTREN (DEFINITY) 8.476 MG IN SODIUM CHLORIDE (PF) 0.9 % 10 ML INJECTION: Performed by: INTERNAL MEDICINE

## 2020-03-04 PROCEDURE — 93306 TTE W/DOPPLER COMPLETE: CPT | Performed by: INTERNAL MEDICINE

## 2020-03-04 PROCEDURE — A9500 TC99M SESTAMIBI: HCPCS | Performed by: INTERNAL MEDICINE

## 2020-03-04 RX ORDER — METHYLPREDNISOLONE 4 MG/1
TABLET ORAL
COMMUNITY
Start: 2020-03-02 | End: 2020-03-09

## 2020-03-04 RX ORDER — SIMVASTATIN 10 MG
TABLET ORAL
COMMUNITY
Start: 2020-03-02 | End: 2020-12-14 | Stop reason: ALTCHOICE

## 2020-03-04 RX ADMIN — TECHNETIUM TC 99M SESTAMIBI 1 DOSE: 1 INJECTION INTRAVENOUS at 10:31

## 2020-03-04 RX ADMIN — REGADENOSON 0.4 MG: 0.08 INJECTION, SOLUTION INTRAVENOUS at 10:31

## 2020-03-04 RX ADMIN — SODIUM CHLORIDE 2 ML: 9 INJECTION INTRAMUSCULAR; INTRAVENOUS; SUBCUTANEOUS at 08:00

## 2020-03-04 RX ADMIN — TECHNETIUM TC 99M SESTAMIBI 1 DOSE: 1 INJECTION INTRAVENOUS at 08:04

## 2020-03-05 LAB
BH CV STRESS BP STAGE 1: NORMAL
BH CV STRESS COMMENTS STAGE 1: NORMAL
BH CV STRESS DOSE REGADENOSON STAGE 1: 0.4
BH CV STRESS DURATION MIN STAGE 1: 1
BH CV STRESS DURATION SEC STAGE 1: 0
BH CV STRESS HR STAGE 1: 75
BH CV STRESS PROTOCOL 1: NORMAL
BH CV STRESS RECOVERY BP: NORMAL MMHG
BH CV STRESS RECOVERY HR: 85 BPM
BH CV STRESS RECOVERY O2: 98 %
BH CV STRESS STAGE 1: 1
LV EF NUC BP: 60 %
MAXIMAL PREDICTED HEART RATE: 160 BPM
PERCENT MAX PREDICTED HR: 46.88 %
STRESS BASELINE BP: NORMAL MMHG
STRESS BASELINE HR: 58 BPM
STRESS O2 SAT REST: 98 %
STRESS PERCENT HR: 55 %
STRESS POST ESTIMATED WORKLOAD: 1 METS
STRESS POST EXERCISE DUR MIN: 1 MIN
STRESS POST EXERCISE DUR SEC: 0 SEC
STRESS POST PEAK BP: NORMAL MMHG
STRESS POST PEAK HR: 75 BPM
STRESS TARGET HR: 136 BPM

## 2020-03-09 ENCOUNTER — OFFICE VISIT (OUTPATIENT)
Dept: CARDIOLOGY | Facility: CLINIC | Age: 60
End: 2020-03-09

## 2020-03-09 VITALS
WEIGHT: 184 LBS | BODY MASS INDEX: 31.41 KG/M2 | HEART RATE: 71 BPM | SYSTOLIC BLOOD PRESSURE: 115 MMHG | DIASTOLIC BLOOD PRESSURE: 74 MMHG | HEIGHT: 64 IN

## 2020-03-09 DIAGNOSIS — R07.2 PRECORDIAL PAIN: ICD-10-CM

## 2020-03-09 DIAGNOSIS — I10 ESSENTIAL HYPERTENSION: ICD-10-CM

## 2020-03-09 DIAGNOSIS — R00.2 PALPITATIONS: Primary | ICD-10-CM

## 2020-03-09 DIAGNOSIS — E66.01 MORBID OBESITY WITH BMI OF 40.0-44.9, ADULT (HCC): ICD-10-CM

## 2020-03-09 PROCEDURE — 99213 OFFICE O/P EST LOW 20 MIN: CPT | Performed by: INTERNAL MEDICINE

## 2020-03-09 NOTE — PROGRESS NOTES
Subjective:        Cherelle Ayala is a 59 y.o. female who here for follow up    CC  Follow-up for the palpitation hypertension  HPI  59-year-old female with known history of palpitations, benign essential arterial hypertension as well as atypical chest pain here for the follow-up, with no complaints of chest pain tightness heaviness or the pressure sensation     Problem List Items Addressed This Visit        Cardiovascular and Mediastinum    Palpitations - Primary    Essential hypertension       Digestive    Morbid obesity with BMI of 40.0-44.9, adult (CMS/HCC)       Nervous and Auditory    Precordial pain      Interpretation Summary        · Findings consistent with a normal ECG stress test.  · Left ventricular ejection fraction is normal (Calculated EF = 60%).  · Myocardial perfusion imaging indicates a normal myocardial perfusion study with no evidence of ischemia.  · Impressions are consistent with a low risk study.  · Very small inferior ischemia seen on polar view , does not appears to be clinically significant     Interpretation Summary     · Mild tricuspid valve regurgitation is present.  · There is calcification of the aortic valve.  · Left atrial cavity size is borderline dilated.  · Calculated EF = 66%  · There is no evidence of pericardial effusion.          .    The following portions of the patient's history were reviewed and updated as appropriate: allergies, current medications, past family history, past medical history, past social history, past surgical history and problem list.    Past Medical History:   Diagnosis Date   • Arthritis    • Heart murmur    • History of anemia    • History of small bowel obstruction     D/T ADHESIONS, HAD MULTIPLE SURGERIES (5)   • History of transfusion    • History of ulcer disease     WITH GI BLEED AND TRANSFUSION   • Hyperlipidemia    • Kidney stone    • Low back pain    • Mitral valve prolapse    • Renal insufficiency    • Tachycardia     FAMILY MD MANAGES,  "PT DENIES AFIB/FLUTTER HX   • Tinnitus of both ears    • Uses contact lenses    • UTI (urinary tract infection) 02/22/2017    FINISHED ALL ABX, BACTRIM 2/28/17     reports that she has never smoked. She has never used smokeless tobacco. She reports that she does not drink alcohol or use drugs.   Family History   Problem Relation Age of Onset   • Heart failure Mother    • Heart disease Mother    • Heart attack Mother    • Hyperlipidemia Father    • Heart failure Father    • Heart disease Father    • Hypertension Sister    • Hyperlipidemia Sister    • Hypertension Brother    • Hyperlipidemia Brother    • Hypertension Brother        Review of Systems  Constitutional: No wt loss, fever, fatigue  Gastrointestinal: No nausea, abdominal pain  Behavioral/Psych: No insomnia or anxiety   Cardiovascular no chest pains or tightness in the chest  Objective:       Physical Exam    /74   Pulse 71   Ht 162.6 cm (64\")   Wt 83.5 kg (184 lb)   BMI 31.58 kg/m²   General appearance: No acute changes   Neck: Trachea midline; NECK, supple, no thyromegaly or lymphadenopathy   Lungs: Normal size and shape, normal breath sounds, equal distribution of air, no rales and rhonchi   CV: S1-S2 regular, no murmurs, no rub, no gallop   Abdomen: Soft, non-tender; no masses , no abnormal abdominal sounds   Extremities: No deformity , normal color , no peripheral edema   Skin: Normal temperature, turgor and texture; no rash, ulcers          Procedures      Echocardiogram:        Current Outpatient Medications:   •  acetaminophen (TYLENOL) 500 MG tablet, Take 500 mg by mouth Every 6 (Six) Hours As Needed for mild pain (1-3)., Disp: , Rfl:   •  atenolol (TENORMIN) 50 MG tablet, 50 mg 2 (Two) Times a Day., Disp: , Rfl:   •  HYDROcodone-acetaminophen (NORCO) 5-325 MG per tablet, , Disp: , Rfl:   •  nitrofurantoin, macrocrystal-monohydrate, (MACROBID) 100 MG capsule, Take 1 capsule by mouth 2 (Two) Times a Day., Disp: 28 capsule, Rfl: 0  •  " omeprazole (priLOSEC) 40 MG capsule, Take 40 mg by mouth 2 (Two) Times a Day., Disp: , Rfl:   •  rOPINIRole XL (REQUIP XL) 2 MG 24 hr tablet, , Disp: , Rfl:   •  simvastatin (ZOCOR) 10 MG tablet, , Disp: , Rfl:   •  sucralfate (CARAFATE) 1 g tablet, Take 1 g by mouth 4 (Four) Times a Day., Disp: , Rfl:   •  vitamin D (ERGOCALCIFEROL) 1.25 MG (29421 UT) capsule capsule, 2 (Two) Times a Week., Disp: , Rfl:    Assessment:        Patient Active Problem List   Diagnosis   • Ureteral calculus, left   • Nephrolithiasis   • Precordial pain   • Palpitations   • Essential hypertension   • Morbid obesity with BMI of 40.0-44.9, adult (CMS/[facility])               Plan:            ICD-10-CM ICD-9-CM   1. Palpitations R00.2 785.1   2. Essential hypertension I10 401.9   3. Morbid obesity with BMI of 40.0-44.9, adult (CMS/HCC) E66.01 278.01    Z68.41 V85.41   4. Precordial pain R07.2 786.51     1. Palpitations  Palpitations under control    2. Essential hypertension  Blood pressure under control    3. Morbid obesity with BMI of 40.0-44.9, adult (CMS/HCC)  Counseling done    4. Precordial pain  Atypical       Specificity and sensitivity of the stress test/ cardiac workup has been explained. Pt has been explained if  Symptoms continue please go to ER, and further w/p will be required.    Also explained this does not rule out coronary artery disease or the future events, continue to emphasize on risk reductions for coronary artery disease    Pt also advised to contact PCP for other causes of symptoms    SEE IN 1 YR    COUNSELING:    Cherelle Hines was given to patient for the following topics: diagnostic results, risk factor reductions, impressions, risks and benefits of treatment options and importance of treatment compliance .       SMOKING COUNSELING:    [unfilled]    Dictated using Dragon dictation

## 2020-09-22 ENCOUNTER — LAB REQUISITION (OUTPATIENT)
Dept: LAB | Facility: HOSPITAL | Age: 60
End: 2020-09-22

## 2020-09-22 DIAGNOSIS — Z00.00 ENCOUNTER FOR GENERAL ADULT MEDICAL EXAMINATION WITHOUT ABNORMAL FINDINGS: ICD-10-CM

## 2020-09-22 PROCEDURE — U0004 COV-19 TEST NON-CDC HGH THRU: HCPCS | Performed by: OPHTHALMOLOGY

## 2020-09-23 LAB — SARS-COV-2 RNA RESP QL NAA+PROBE: NOT DETECTED

## 2020-12-14 ENCOUNTER — LAB (OUTPATIENT)
Dept: LAB | Facility: HOSPITAL | Age: 60
End: 2020-12-14

## 2020-12-14 ENCOUNTER — TRANSCRIBE ORDERS (OUTPATIENT)
Dept: SLEEP MEDICINE | Facility: HOSPITAL | Age: 60
End: 2020-12-14

## 2020-12-14 ENCOUNTER — HOSPITAL ENCOUNTER (OUTPATIENT)
Dept: CARDIOLOGY | Facility: HOSPITAL | Age: 60
Discharge: HOME | End: 2020-12-14

## 2020-12-14 ENCOUNTER — OFFICE VISIT (OUTPATIENT)
Dept: CARDIOLOGY | Facility: CLINIC | Age: 60
End: 2020-12-14

## 2020-12-14 VITALS
SYSTOLIC BLOOD PRESSURE: 124 MMHG | HEART RATE: 67 BPM | BODY MASS INDEX: 33.12 KG/M2 | DIASTOLIC BLOOD PRESSURE: 76 MMHG | WEIGHT: 194 LBS | HEIGHT: 64 IN

## 2020-12-14 DIAGNOSIS — Z01.818 OTHER SPECIFIED PRE-OPERATIVE EXAMINATION: ICD-10-CM

## 2020-12-14 DIAGNOSIS — E66.01 MORBID OBESITY WITH BMI OF 40.0-44.9, ADULT (HCC): ICD-10-CM

## 2020-12-14 DIAGNOSIS — Z01.818 OTHER SPECIFIED PRE-OPERATIVE EXAMINATION: Primary | ICD-10-CM

## 2020-12-14 DIAGNOSIS — I10 ESSENTIAL HYPERTENSION: ICD-10-CM

## 2020-12-14 DIAGNOSIS — R00.2 PALPITATIONS: ICD-10-CM

## 2020-12-14 DIAGNOSIS — R07.2 PRECORDIAL PAIN: Primary | ICD-10-CM

## 2020-12-14 LAB
ANION GAP SERPL CALCULATED.3IONS-SCNC: 12.1 MMOL/L (ref 5–15)
APTT PPP: 29.1 SECONDS (ref 22.7–35.4)
BASOPHILS # BLD AUTO: 0.05 10*3/MM3 (ref 0–0.2)
BASOPHILS NFR BLD AUTO: 0.6 % (ref 0–1.5)
BUN SERPL-MCNC: 12 MG/DL (ref 8–23)
BUN/CREAT SERPL: 18.5 (ref 7–25)
CALCIUM SPEC-SCNC: 9.3 MG/DL (ref 8.6–10.5)
CHLORIDE SERPL-SCNC: 108 MMOL/L (ref 98–107)
CO2 SERPL-SCNC: 18.9 MMOL/L (ref 22–29)
CREAT SERPL-MCNC: 0.65 MG/DL (ref 0.57–1)
DEPRECATED RDW RBC AUTO: 45.1 FL (ref 37–54)
EOSINOPHIL # BLD AUTO: 0.09 10*3/MM3 (ref 0–0.4)
EOSINOPHIL NFR BLD AUTO: 1.1 % (ref 0.3–6.2)
ERYTHROCYTE [DISTWIDTH] IN BLOOD BY AUTOMATED COUNT: 15.1 % (ref 12.3–15.4)
GFR SERPL CREATININE-BSD FRML MDRD: 112 ML/MIN/1.73
GLUCOSE SERPL-MCNC: 91 MG/DL (ref 65–99)
HCT VFR BLD AUTO: 38.5 % (ref 34–46.6)
HGB BLD-MCNC: 12.9 G/DL (ref 12–15.9)
IMM GRANULOCYTES # BLD AUTO: 0.02 10*3/MM3 (ref 0–0.05)
IMM GRANULOCYTES NFR BLD AUTO: 0.2 % (ref 0–0.5)
INR PPP: 1.05 (ref 0.9–1.1)
LYMPHOCYTES # BLD AUTO: 2.01 10*3/MM3 (ref 0.7–3.1)
LYMPHOCYTES NFR BLD AUTO: 23.9 % (ref 19.6–45.3)
MCH RBC QN AUTO: 28.3 PG (ref 26.6–33)
MCHC RBC AUTO-ENTMCNC: 33.5 G/DL (ref 31.5–35.7)
MCV RBC AUTO: 84.4 FL (ref 79–97)
MONOCYTES # BLD AUTO: 0.44 10*3/MM3 (ref 0.1–0.9)
MONOCYTES NFR BLD AUTO: 5.2 % (ref 5–12)
NEUTROPHILS NFR BLD AUTO: 5.79 10*3/MM3 (ref 1.7–7)
NEUTROPHILS NFR BLD AUTO: 69 % (ref 42.7–76)
NRBC BLD AUTO-RTO: 0 /100 WBC (ref 0–0.2)
PLATELET # BLD AUTO: 280 10*3/MM3 (ref 140–450)
PMV BLD AUTO: 9.8 FL (ref 6–12)
POTASSIUM SERPL-SCNC: 3.5 MMOL/L (ref 3.5–5.2)
PROTHROMBIN TIME: 13.5 SECONDS (ref 11.7–14.2)
RBC # BLD AUTO: 4.56 10*6/MM3 (ref 3.77–5.28)
SARS-COV-2 RNA RESP QL NAA+PROBE: NOT DETECTED
SODIUM SERPL-SCNC: 139 MMOL/L (ref 136–145)
WBC # BLD AUTO: 8.4 10*3/MM3 (ref 3.4–10.8)

## 2020-12-14 PROCEDURE — 80048 BASIC METABOLIC PNL TOTAL CA: CPT | Performed by: INTERNAL MEDICINE

## 2020-12-14 PROCEDURE — 85025 COMPLETE CBC W/AUTO DIFF WBC: CPT | Performed by: INTERNAL MEDICINE

## 2020-12-14 PROCEDURE — U0003 INFECTIOUS AGENT DETECTION BY NUCLEIC ACID (DNA OR RNA); SEVERE ACUTE RESPIRATORY SYNDROME CORONAVIRUS 2 (SARS-COV-2) (CORONAVIRUS DISEASE [COVID-19]), AMPLIFIED PROBE TECHNIQUE, MAKING USE OF HIGH THROUGHPUT TECHNOLOGIES AS DESCRIBED BY CMS-2020-01-R: HCPCS

## 2020-12-14 PROCEDURE — 85610 PROTHROMBIN TIME: CPT | Performed by: INTERNAL MEDICINE

## 2020-12-14 PROCEDURE — 93000 ELECTROCARDIOGRAM COMPLETE: CPT | Performed by: INTERNAL MEDICINE

## 2020-12-14 PROCEDURE — 99214 OFFICE O/P EST MOD 30 MIN: CPT | Performed by: INTERNAL MEDICINE

## 2020-12-14 PROCEDURE — C9803 HOPD COVID-19 SPEC COLLECT: HCPCS

## 2020-12-14 PROCEDURE — 85730 THROMBOPLASTIN TIME PARTIAL: CPT | Performed by: INTERNAL MEDICINE

## 2020-12-14 RX ORDER — PROPRANOLOL HYDROCHLORIDE 20 MG/1
20 TABLET ORAL 3 TIMES DAILY
COMMUNITY
Start: 2020-10-19

## 2020-12-14 RX ORDER — AMANTADINE HYDROCHLORIDE 100 MG/1
TABLET ORAL
Status: ON HOLD | COMMUNITY
Start: 2020-11-25 | End: 2020-12-15

## 2020-12-14 RX ORDER — METHIMAZOLE 10 MG/1
15 TABLET ORAL DAILY
COMMUNITY
Start: 2020-09-03

## 2020-12-14 RX ORDER — ERGOCALCIFEROL 1.25 MG/1
50000 CAPSULE ORAL
COMMUNITY
Start: 2020-08-18

## 2020-12-14 RX ORDER — ATORVASTATIN CALCIUM 40 MG/1
40 TABLET, FILM COATED ORAL NIGHTLY
COMMUNITY
Start: 2020-12-08

## 2020-12-14 NOTE — PROGRESS NOTES
STILL HAVING CHEST PRESSURE, POSSIBLE CATH   Subjective:        Cherelle Ayala is a 60 y.o. female who here for follow up    CC  Still having cp    Miss beats    HPI  60-year-old female with a known history of the palpitations, benign essential arterial hypertension obesity continues to complains of the retrosternal mild to moderate intensity intermittent chest pains associated with missed beats     Problems Addressed this Visit        Cardiovascular and Mediastinum    Palpitations    Essential hypertension    Relevant Medications    propranolol (INDERAL) 20 MG tablet       Digestive    Morbid obesity with BMI of 40.0-44.9, adult (CMS/[facility])       Nervous and Auditory    Precordial pain - Primary    Relevant Orders    Case Request Cath Lab: Left Heart Cath (Completed)    CBC & Differential (Completed)    Basic Metabolic Panel (Completed)    aPTT (Completed)    Protime-INR (Completed)    CBC Auto Differential (Completed)      Diagnoses       Codes Comments    Precordial pain    -  Primary ICD-10-CM: R07.2  ICD-9-CM: 786.51     Essential hypertension     ICD-10-CM: I10  ICD-9-CM: 401.9     Palpitations     ICD-10-CM: R00.2  ICD-9-CM: 785.1     Morbid obesity with BMI of 40.0-44.9, adult (CMS/[facility])     ICD-10-CM: E66.01, Z68.41  ICD-9-CM: 278.01, V85.41         .    The following portions of the patient's history were reviewed and updated as appropriate: allergies, current medications, past family history, past medical history, past social history, past surgical history and problem list.    Past Medical History:   Diagnosis Date   • Arthritis    • Heart murmur    • History of anemia    • History of small bowel obstruction     D/T ADHESIONS, HAD MULTIPLE SURGERIES (5)   • History of transfusion    • History of ulcer disease     WITH GI BLEED AND TRANSFUSION   • Hyperlipidemia    • Kidney stone    • Low back pain    • Mitral valve prolapse    • Renal insufficiency    • Tachycardia     FAMILY MD MANAGES, PT DENIES  "AFIB/FLUTTER HX   • Tinnitus of both ears    • Uses contact lenses    • UTI (urinary tract infection) 02/22/2017    FINISHED ALL ABX, BACTRIM 2/28/17     reports that she has never smoked. She has never used smokeless tobacco. She reports that she does not drink alcohol or use drugs.   Family History   Problem Relation Age of Onset   • Heart failure Mother    • Heart disease Mother    • Heart attack Mother    • Hyperlipidemia Father    • Heart failure Father    • Heart disease Father    • Hypertension Sister    • Hyperlipidemia Sister    • Hypertension Brother    • Hyperlipidemia Brother    • Hypertension Brother        Review of Systems  Constitutional: No wt loss, fever, fatigue  Gastrointestinal: No nausea, abdominal pain  Behavioral/Psych: No insomnia or anxiety   Cardiovascular chest pain with skipped beats  Objective:       Physical Exam  /76   Pulse 67   Ht 162.6 cm (64\")   Wt 88 kg (194 lb)   BMI 33.30 kg/m²   General appearance: No acute changes   Neck: Trachea midline; NECK, supple, no thyromegaly or lymphadenopathy   Lungs: Normal size and shape, normal breath sounds, equal distribution of air, no rales and rhonchi   CV: S1-S2 regular, no murmurs, no rub, no gallop   Abdomen: Soft, non-tender; no masses , no abnormal abdominal sounds   Extremities: No deformity , normal color , no peripheral edema   Skin: Normal temperature, turgor and texture; no rash, ulcers            ECG 12 Lead    Date/Time: 12/14/2020 1:20 PM  Performed by: Kilo Cook MD  Authorized by: Kilo Cook MD   Comparison: compared with previous ECG   Similar to previous ECG  Rhythm: sinus rhythm  ST Flattening: all    Clinical impression: non-specific ECG              Echocardiogram:        Current Outpatient Medications:   •  acetaminophen (TYLENOL) 500 MG tablet, Take 500 mg by mouth Every 6 (Six) Hours As Needed for mild pain (1-3)., Disp: , Rfl:   •  amantadine (SYMMETREL) 100 MG tablet, , Disp: , Rfl: "   •  atorvastatin (LIPITOR) 40 MG tablet, , Disp: , Rfl:   •  clopidogrel (PLAVIX) 75 MG tablet, Take 75 mg by mouth Daily., Disp: , Rfl:   •  HYDROcodone-acetaminophen (NORCO) 5-325 MG per tablet, , Disp: , Rfl:   •  methIMAzole (TAPAZOLE) 10 MG tablet, Take 15 mg by mouth Daily., Disp: , Rfl:   •  nitrofurantoin, macrocrystal-monohydrate, (MACROBID) 100 MG capsule, Take 1 capsule by mouth 2 (Two) Times a Day., Disp: 28 capsule, Rfl: 0  •  omeprazole (priLOSEC) 40 MG capsule, Take 40 mg by mouth Daily., Disp: , Rfl:   •  propranolol (INDERAL) 20 MG tablet, 20 mg 3 (Three) Times a Day., Disp: , Rfl:   •  rOPINIRole XL (REQUIP XL) 2 MG 24 hr tablet, , Disp: , Rfl:   •  sertraline (ZOLOFT) 25 MG tablet, Take 25 mg by mouth Daily., Disp: , Rfl:   •  vitamin D (ERGOCALCIFEROL) 1.25 MG (76061 UT) capsule capsule, TAKE ONE CAPSULE BY MOUTH TWICE A WEEK, Disp: , Rfl:    Assessment:        Patient Active Problem List   Diagnosis   • Ureteral calculus, left   • Nephrolithiasis   • Precordial pain   • Palpitations   • Essential hypertension   • Morbid obesity with BMI of 40.0-44.9, adult (CMS/[facility])               Plan:            ICD-10-CM ICD-9-CM   1. Precordial pain  R07.2 786.51   2. Essential hypertension  I10 401.9   3. Palpitations  R00.2 785.1   4. Morbid obesity with BMI of 40.0-44.9, adult (CMS/[facility])  E66.01 278.01    Z68.41 V85.41     1. Precordial pain  Procedure, risks and options of cardiac cath explained to pt INCLUDING BUT NOT LIMITED TO MI, STROKE, DEATH, INFECTION HAEMORRHAGE, . Pt understands well and agrees with no further questions.    - Case Request Cath Lab: Left Heart Cath  - CBC & Differential  - Basic Metabolic Panel  - aPTT  - Protime-INR  - CBC Auto Differential    2. Essential hypertension  Blood pressure under control    3. Palpitations  Under control    4. Morbid obesity with BMI of 40.0-44.9, adult (CMS/[facility])  Counseling done       Cp with minimum excecise    Procedure, risks and options of cardiac  cath explained to pt INCLUDING BUT NOT LIMITED TO MI, STROKE, DEATH, INFECTION HAEMORRHAGE, . Pt understands well and agrees with no further questions.  COUNSELING:    Cherelle Hines was given to patient for the following topics: diagnostic results, risk factor reductions, impressions, risks and benefits of treatment options and importance of treatment compliance .       SMOKING COUNSELING:    [unfilled]    Dictated using Dragon dictation

## 2020-12-14 NOTE — H&P (VIEW-ONLY)
STILL HAVING CHEST PRESSURE, POSSIBLE CATH   Subjective:        Cherelle Ayala is a 60 y.o. female who here for follow up    CC  Still having cp    Miss beats    HPI  60-year-old female with a known history of the palpitations, benign essential arterial hypertension obesity continues to complains of the retrosternal mild to moderate intensity intermittent chest pains associated with missed beats     Problems Addressed this Visit        Cardiovascular and Mediastinum    Palpitations    Essential hypertension    Relevant Medications    propranolol (INDERAL) 20 MG tablet       Digestive    Morbid obesity with BMI of 40.0-44.9, adult (CMS/[facility])       Nervous and Auditory    Precordial pain - Primary    Relevant Orders    Case Request Cath Lab: Left Heart Cath (Completed)    CBC & Differential (Completed)    Basic Metabolic Panel (Completed)    aPTT (Completed)    Protime-INR (Completed)    CBC Auto Differential (Completed)      Diagnoses       Codes Comments    Precordial pain    -  Primary ICD-10-CM: R07.2  ICD-9-CM: 786.51     Essential hypertension     ICD-10-CM: I10  ICD-9-CM: 401.9     Palpitations     ICD-10-CM: R00.2  ICD-9-CM: 785.1     Morbid obesity with BMI of 40.0-44.9, adult (CMS/[facility])     ICD-10-CM: E66.01, Z68.41  ICD-9-CM: 278.01, V85.41         .    The following portions of the patient's history were reviewed and updated as appropriate: allergies, current medications, past family history, past medical history, past social history, past surgical history and problem list.    Past Medical History:   Diagnosis Date   • Arthritis    • Heart murmur    • History of anemia    • History of small bowel obstruction     D/T ADHESIONS, HAD MULTIPLE SURGERIES (5)   • History of transfusion    • History of ulcer disease     WITH GI BLEED AND TRANSFUSION   • Hyperlipidemia    • Kidney stone    • Low back pain    • Mitral valve prolapse    • Renal insufficiency    • Tachycardia     FAMILY MD MANAGES, PT DENIES  "AFIB/FLUTTER HX   • Tinnitus of both ears    • Uses contact lenses    • UTI (urinary tract infection) 02/22/2017    FINISHED ALL ABX, BACTRIM 2/28/17     reports that she has never smoked. She has never used smokeless tobacco. She reports that she does not drink alcohol or use drugs.   Family History   Problem Relation Age of Onset   • Heart failure Mother    • Heart disease Mother    • Heart attack Mother    • Hyperlipidemia Father    • Heart failure Father    • Heart disease Father    • Hypertension Sister    • Hyperlipidemia Sister    • Hypertension Brother    • Hyperlipidemia Brother    • Hypertension Brother        Review of Systems  Constitutional: No wt loss, fever, fatigue  Gastrointestinal: No nausea, abdominal pain  Behavioral/Psych: No insomnia or anxiety   Cardiovascular chest pain with skipped beats  Objective:       Physical Exam  /76   Pulse 67   Ht 162.6 cm (64\")   Wt 88 kg (194 lb)   BMI 33.30 kg/m²   General appearance: No acute changes   Neck: Trachea midline; NECK, supple, no thyromegaly or lymphadenopathy   Lungs: Normal size and shape, normal breath sounds, equal distribution of air, no rales and rhonchi   CV: S1-S2 regular, no murmurs, no rub, no gallop   Abdomen: Soft, non-tender; no masses , no abnormal abdominal sounds   Extremities: No deformity , normal color , no peripheral edema   Skin: Normal temperature, turgor and texture; no rash, ulcers            ECG 12 Lead    Date/Time: 12/14/2020 1:20 PM  Performed by: Kilo Cook MD  Authorized by: Kilo Cook MD   Comparison: compared with previous ECG   Similar to previous ECG  Rhythm: sinus rhythm  ST Flattening: all    Clinical impression: non-specific ECG              Echocardiogram:        Current Outpatient Medications:   •  acetaminophen (TYLENOL) 500 MG tablet, Take 500 mg by mouth Every 6 (Six) Hours As Needed for mild pain (1-3)., Disp: , Rfl:   •  amantadine (SYMMETREL) 100 MG tablet, , Disp: , Rfl: "   •  atorvastatin (LIPITOR) 40 MG tablet, , Disp: , Rfl:   •  clopidogrel (PLAVIX) 75 MG tablet, Take 75 mg by mouth Daily., Disp: , Rfl:   •  HYDROcodone-acetaminophen (NORCO) 5-325 MG per tablet, , Disp: , Rfl:   •  methIMAzole (TAPAZOLE) 10 MG tablet, Take 15 mg by mouth Daily., Disp: , Rfl:   •  nitrofurantoin, macrocrystal-monohydrate, (MACROBID) 100 MG capsule, Take 1 capsule by mouth 2 (Two) Times a Day., Disp: 28 capsule, Rfl: 0  •  omeprazole (priLOSEC) 40 MG capsule, Take 40 mg by mouth Daily., Disp: , Rfl:   •  propranolol (INDERAL) 20 MG tablet, 20 mg 3 (Three) Times a Day., Disp: , Rfl:   •  rOPINIRole XL (REQUIP XL) 2 MG 24 hr tablet, , Disp: , Rfl:   •  sertraline (ZOLOFT) 25 MG tablet, Take 25 mg by mouth Daily., Disp: , Rfl:   •  vitamin D (ERGOCALCIFEROL) 1.25 MG (53183 UT) capsule capsule, TAKE ONE CAPSULE BY MOUTH TWICE A WEEK, Disp: , Rfl:    Assessment:        Patient Active Problem List   Diagnosis   • Ureteral calculus, left   • Nephrolithiasis   • Precordial pain   • Palpitations   • Essential hypertension   • Morbid obesity with BMI of 40.0-44.9, adult (CMS/[facility])               Plan:            ICD-10-CM ICD-9-CM   1. Precordial pain  R07.2 786.51   2. Essential hypertension  I10 401.9   3. Palpitations  R00.2 785.1   4. Morbid obesity with BMI of 40.0-44.9, adult (CMS/[facility])  E66.01 278.01    Z68.41 V85.41     1. Precordial pain  Procedure, risks and options of cardiac cath explained to pt INCLUDING BUT NOT LIMITED TO MI, STROKE, DEATH, INFECTION HAEMORRHAGE, . Pt understands well and agrees with no further questions.    - Case Request Cath Lab: Left Heart Cath  - CBC & Differential  - Basic Metabolic Panel  - aPTT  - Protime-INR  - CBC Auto Differential    2. Essential hypertension  Blood pressure under control    3. Palpitations  Under control    4. Morbid obesity with BMI of 40.0-44.9, adult (CMS/[facility])  Counseling done       Cp with minimum excecise    Procedure, risks and options of cardiac  cath explained to pt INCLUDING BUT NOT LIMITED TO MI, STROKE, DEATH, INFECTION HAEMORRHAGE, . Pt understands well and agrees with no further questions.  COUNSELING:    Cherelle Hines was given to patient for the following topics: diagnostic results, risk factor reductions, impressions, risks and benefits of treatment options and importance of treatment compliance .       SMOKING COUNSELING:    [unfilled]    Dictated using Dragon dictation

## 2020-12-15 ENCOUNTER — HOSPITAL ENCOUNTER (OUTPATIENT)
Facility: HOSPITAL | Age: 60
Setting detail: HOSPITAL OUTPATIENT SURGERY
Discharge: HOME | End: 2020-12-15
Attending: INTERNAL MEDICINE | Admitting: INTERNAL MEDICINE

## 2020-12-15 VITALS
TEMPERATURE: 97.7 F | SYSTOLIC BLOOD PRESSURE: 128 MMHG | HEIGHT: 64 IN | HEART RATE: 87 BPM | WEIGHT: 193 LBS | RESPIRATION RATE: 16 BRPM | DIASTOLIC BLOOD PRESSURE: 58 MMHG | BODY MASS INDEX: 32.95 KG/M2 | OXYGEN SATURATION: 96 %

## 2020-12-15 DIAGNOSIS — R07.2 PRECORDIAL PAIN: ICD-10-CM

## 2020-12-15 PROCEDURE — 25010000002 MIDAZOLAM PER 1 MG: Performed by: INTERNAL MEDICINE

## 2020-12-15 PROCEDURE — 93458 L HRT ARTERY/VENTRICLE ANGIO: CPT | Performed by: INTERNAL MEDICINE

## 2020-12-15 PROCEDURE — 25010000002 HEPARIN (PORCINE) PER 1000 UNITS: Performed by: INTERNAL MEDICINE

## 2020-12-15 PROCEDURE — C1769 GUIDE WIRE: HCPCS | Performed by: INTERNAL MEDICINE

## 2020-12-15 PROCEDURE — 0 IOPAMIDOL PER 1 ML: Performed by: INTERNAL MEDICINE

## 2020-12-15 PROCEDURE — 25010000002 FENTANYL CITRATE (PF) 100 MCG/2ML SOLUTION: Performed by: INTERNAL MEDICINE

## 2020-12-15 PROCEDURE — C1894 INTRO/SHEATH, NON-LASER: HCPCS | Performed by: INTERNAL MEDICINE

## 2020-12-15 RX ORDER — LIDOCAINE HYDROCHLORIDE 20 MG/ML
INJECTION, SOLUTION INFILTRATION; PERINEURAL AS NEEDED
Status: DISCONTINUED | OUTPATIENT
Start: 2020-12-15 | End: 2020-12-15 | Stop reason: HOSPADM

## 2020-12-15 RX ORDER — MIDAZOLAM HYDROCHLORIDE 1 MG/ML
INJECTION INTRAMUSCULAR; INTRAVENOUS AS NEEDED
Status: DISCONTINUED | OUTPATIENT
Start: 2020-12-15 | End: 2020-12-15 | Stop reason: HOSPADM

## 2020-12-15 RX ORDER — SODIUM CHLORIDE 9 MG/ML
75 INJECTION, SOLUTION INTRAVENOUS CONTINUOUS
Status: DISCONTINUED | OUTPATIENT
Start: 2020-12-15 | End: 2020-12-15 | Stop reason: HOSPADM

## 2020-12-15 RX ORDER — SODIUM CHLORIDE 0.9 % (FLUSH) 0.9 %
3 SYRINGE (ML) INJECTION EVERY 12 HOURS SCHEDULED
Status: DISCONTINUED | OUTPATIENT
Start: 2020-12-15 | End: 2020-12-15 | Stop reason: HOSPADM

## 2020-12-15 RX ORDER — SODIUM CHLORIDE 0.9 % (FLUSH) 0.9 %
10 SYRINGE (ML) INJECTION AS NEEDED
Status: DISCONTINUED | OUTPATIENT
Start: 2020-12-15 | End: 2020-12-15 | Stop reason: HOSPADM

## 2020-12-15 RX ORDER — LIDOCAINE HYDROCHLORIDE 10 MG/ML
0.1 INJECTION, SOLUTION EPIDURAL; INFILTRATION; INTRACAUDAL; PERINEURAL ONCE AS NEEDED
Status: DISCONTINUED | OUTPATIENT
Start: 2020-12-15 | End: 2020-12-15 | Stop reason: HOSPADM

## 2020-12-15 RX ORDER — ACETAMINOPHEN 325 MG/1
650 TABLET ORAL EVERY 4 HOURS PRN
Status: CANCELLED | OUTPATIENT
Start: 2020-12-15

## 2020-12-15 RX ORDER — FENTANYL CITRATE 50 UG/ML
INJECTION, SOLUTION INTRAMUSCULAR; INTRAVENOUS AS NEEDED
Status: DISCONTINUED | OUTPATIENT
Start: 2020-12-15 | End: 2020-12-15 | Stop reason: HOSPADM

## 2020-12-15 RX ORDER — HYDROCODONE BITARTRATE AND ACETAMINOPHEN 7.5; 325 MG/1; MG/1
1 TABLET ORAL EVERY 6 HOURS PRN
Status: DISCONTINUED | OUTPATIENT
Start: 2020-12-15 | End: 2020-12-15 | Stop reason: HOSPADM

## 2020-12-15 RX ADMIN — SODIUM CHLORIDE 75 ML/HR: 9 INJECTION, SOLUTION INTRAVENOUS at 10:34

## 2020-12-15 RX ADMIN — HYDROCODONE BITARTRATE AND ACETAMINOPHEN 1 TABLET: 7.5; 325 TABLET ORAL at 11:56

## 2020-12-15 NOTE — DISCHARGE INSTRUCTIONS
[facility]  [address] [place] [address]    Coronary Angiogram (Radial/Ulnar Approach) After Care    Refer to this sheet in the next few weeks. These instructions provide you with information on caring for yourself after your procedure. Your caregiver may also give you more specific instructions. Your treatment has been planned according to current medical practices, but problems sometimes occur. Call your caregiver if you have any problems or questions after your procedure.    Home Care Instructions:  · You may shower the day after the procedure. Remove the bandage (dressing) and gently wash the site with plain soap and water. Gently pat the site dry. You may apply a band aid daily for 2 days if desired.    · Do not apply powder or lotion to the site.  · Do not submerge the affected site in water for 3 to 5 days or until the site is completely healed.   · Do not lift, push or pull anything over 5 pounds for 5 days after your procedure. As a reference, a gallon of milk weighs 8 pounds.   · Inspect the site at least twice daily. You may notice some bruising at the site and it may be tender for 1 to 2 weeks.     · Increase your fluid intake for the next 2 days.    · Keep arm elevated for 24 hours. For the remainder of the day, keep your arm in “Pledge of Allegiance” position when up and about.     · You may drive 24 hours after the procedure unless otherwise instructed by your caregiver.  · Do not operate machinery or power tools for 24 hours.  · A responsible adult should be with you for the first 24 hours after you arrive home. Do not make any important legal decisions or sign legal papers for 24 hours.  Do not drink alcohol for 24 hours.    · Metformin or any medications containing Metformin should not be taken for 48 hours after your procedure.      Call Your Doctor if:   · You have unusual pain at the radial/ulnar (wrist) site.  · You have redness, warmth, swelling, or pain at the  radial/ulnar (wrist) site.  · You have drainage (other than a small amount of blood on the dressing).  · You have chills or a fever > 101.  · Your arm becomes pale or dark, cool, tingly, or numb.  · You have heavy bleeding from the site, hold pressure on the site for 20 minutes.  If the bleeding stops, apply a fresh bandage and call your cardiologist.  However, if you continue to have bleeding, call 911.

## 2021-03-16 ENCOUNTER — BULK ORDERING (OUTPATIENT)
Dept: CASE MANAGEMENT | Facility: OTHER | Age: 61
End: 2021-03-16

## 2021-03-16 DIAGNOSIS — Z23 IMMUNIZATION DUE: ICD-10-CM

## 2021-03-22 RX ORDER — MONTELUKAST SODIUM 10 MG/1
10 TABLET ORAL NIGHTLY
COMMUNITY

## 2021-03-23 ENCOUNTER — OFFICE VISIT (OUTPATIENT)
Dept: CARDIOLOGY | Facility: CLINIC | Age: 61
End: 2021-03-23

## 2021-03-23 VITALS
BODY MASS INDEX: 33.63 KG/M2 | HEIGHT: 64 IN | WEIGHT: 197 LBS | HEART RATE: 72 BPM | SYSTOLIC BLOOD PRESSURE: 130 MMHG | DIASTOLIC BLOOD PRESSURE: 72 MMHG

## 2021-03-23 DIAGNOSIS — I10 ESSENTIAL HYPERTENSION: ICD-10-CM

## 2021-03-23 DIAGNOSIS — R00.2 PALPITATIONS: ICD-10-CM

## 2021-03-23 DIAGNOSIS — E66.01 MORBID OBESITY WITH BMI OF 40.0-44.9, ADULT (HCC): ICD-10-CM

## 2021-03-23 DIAGNOSIS — Z09 HOSPITAL DISCHARGE FOLLOW-UP: Primary | ICD-10-CM

## 2021-03-23 PROCEDURE — 99213 OFFICE O/P EST LOW 20 MIN: CPT | Performed by: NURSE PRACTITIONER

## 2021-03-23 RX ORDER — FLUTICASONE PROPIONATE 50 MCG
1 SPRAY, SUSPENSION (ML) NASAL DAILY
COMMUNITY
Start: 2021-03-08 | End: 2022-03-08

## 2021-03-23 NOTE — PROGRESS NOTES
Subjective:        Cherelle Ayala is a 60 y.o. female who here for follow up    Chief Complaint   Patient presents with   • Follow-up     CATH       HPI  Cherelle Ayala is a 60-year-old female, who is new to me.  She has a history to include heart murmur, hypertension, hyperlipidemia, palpitations, MV prolapse, and tachycardia.  Cardiac cath on 12/15/2020 indicated normal coronary arteries.  Medical management was discussed. Her echo on 3/4/2020 revealed EF 66%, LAC size is borderline dilated, calcification of aortic valve, mild TV regurgitation and no evidence of pericardial effusion.    The following portions of the patient's history were reviewed and updated as appropriate: allergies, current medications, past family history, past medical history, past social history, past surgical history and problem list.    Past Medical History:   Diagnosis Date   • Arthritis    • Heart murmur    • History of anemia    • History of small bowel obstruction     D/T ADHESIONS, HAD MULTIPLE SURGERIES (5)   • History of transfusion    • History of ulcer disease     WITH GI BLEED AND TRANSFUSION   • Hyperlipidemia    • Kidney stone    • Low back pain    • Mitral valve prolapse    • Renal insufficiency    • Tachycardia     FAMILY MD MANAGES, PT DENIES AFIB/FLUTTER HX   • Tinnitus of both ears    • Uses contact lenses    • UTI (urinary tract infection) 02/22/2017    FINISHED ALL ABX, BACTRIM 2/28/17         reports that she has never smoked. She has never used smokeless tobacco. She reports that she does not drink alcohol and does not use drugs.     Family History   Problem Relation Age of Onset   • Heart failure Mother    • Heart disease Mother    • Heart attack Mother    • Hyperlipidemia Father    • Heart failure Father    • Heart disease Father    • Hypertension Sister    • Hyperlipidemia Sister    • Hypertension Brother    • Hyperlipidemia Brother    • Hypertension Brother        ROS     Review of Systems  Constitutional: No  wt loss, fever, fatigue  Gastrointestinal: No nausea, abdominal pain  Behavioral/Psych: No insomnia or anxiety  Cardiovascular: denies chest pain,  syncope near syncope.  + shortness of breath that comes and goes    Objective:           Vitals and nursing note reviewed.   Constitutional:       Appearance: Well-developed.   HENT:      Head: Normocephalic.      Right Ear: External ear normal.      Left Ear: External ear normal.   Neck:      Vascular: No JVD.   Pulmonary:      Effort: Pulmonary effort is normal. No respiratory distress.      Breath sounds: Normal breath sounds. No stridor. No rales.   Cardiovascular:      Normal rate. Regular rhythm.      No gallop.      Comments: Her cath site shows no signs or symptoms of infection or hematoma noted.  Pulses:     Intact distal pulses.   Edema:     Peripheral edema absent.   Abdominal:      General: Bowel sounds are normal. There is no distension.      Palpations: Abdomen is soft.      Tenderness: There is no abdominal tenderness. There is no guarding.   Musculoskeletal: Normal range of motion.         General: No tenderness.      Cervical back: Normal range of motion. Skin:     General: Skin is warm.   Neurological:      Mental Status: Alert and oriented to person, place, and time.      Deep Tendon Reflexes: Reflexes are normal and symmetric.   Psychiatric:         Judgment: Judgment normal.         Procedures       Impression:      1. Normal coronary arteries  2. Normal LV gram     Recommendations:      1. Medical management            I sincerely appreciate the opportunity to participate in your patient's care. Please feel free to contact me anytime if I can be of assistance in this or any other way.     Kilo Cook MD  12/15/2020  11:40 EST  Interpretation Summary    · Mild tricuspid valve regurgitation is present.  · There is calcification of the aortic valve.  · Left atrial cavity size is borderline dilated.  · Calculated EF = 66%  · There is no  evidence of pericardial effusion.        Interpretation Summary       · Findings consistent with a normal ECG stress test.  · Left ventricular ejection fraction is normal (Calculated EF = 60%).  · Myocardial perfusion imaging indicates a normal myocardial perfusion study with no evidence of ischemia.  · Impressions are consistent with a low risk study.  · Very small inferior ischemia seen on polar view , does not appears to be clinically significant     Asymptomatic for chest pain. ECG is negative for ischemia.   Ectopy: none  B/P is appropriate for Beta-blocker therapy.  Pharmacologic study due to inability to tolerate increasing speed and grade of treadmill due to c/o low back pain and Beta-blocker therapy.  Unable to participate in low level exercise due to c/o low back pain.  Supervised by:  Smiley LUNA            Current Outpatient Medications:   •  acetaminophen (TYLENOL) 500 MG tablet, Take 500 mg by mouth Every 6 (Six) Hours As Needed for mild pain (1-3)., Disp: , Rfl:   •  atorvastatin (LIPITOR) 40 MG tablet, Take 40 mg by mouth Every Night., Disp: , Rfl:   •  clopidogrel (PLAVIX) 75 MG tablet, Take 75 mg by mouth Daily., Disp: , Rfl:   •  fluticasone (FLONASE) 50 MCG/ACT nasal spray, 1 spray into the nostril(s) as directed by provider Daily., Disp: , Rfl:   •  HYDROcodone-acetaminophen (NORCO) 5-325 MG per tablet, Take 1 tablet by mouth Every 6 (Six) Hours As Needed., Disp: , Rfl:   •  methIMAzole (TAPAZOLE) 10 MG tablet, Take 15 mg by mouth Daily., Disp: , Rfl:   •  montelukast (SINGULAIR) 10 MG tablet, Take 10 mg by mouth Every Night., Disp: , Rfl:   •  nitrofurantoin, macrocrystal-monohydrate, (MACROBID) 100 MG capsule, Take 1 capsule by mouth 2 (Two) Times a Day., Disp: 28 capsule, Rfl: 0  •  propranolol (INDERAL) 20 MG tablet, 20 mg 3 (Three) Times a Day., Disp: , Rfl:   •  rOPINIRole XL (REQUIP XL) 2 MG 24 hr tablet, Take 2 mg by mouth Every Night., Disp: , Rfl:   •  sertraline (ZOLOFT)  25 MG tablet, Take 25 mg by mouth Daily., Disp: , Rfl:   •  vitamin D (ERGOCALCIFEROL) 1.25 MG (13799 UT) capsule capsule, 50,000 Units Every 7 (Seven) Days., Disp: , Rfl:      Assessment:        Patient Active Problem List   Diagnosis   • Ureteral calculus, left   • Nephrolithiasis   • Precordial pain   • Palpitations   • Essential hypertension   • Morbid obesity with BMI of 40.0-44.9, adult (CMS/[facility])               Plan:   1.  Hospital follow-up.  She had a cardiac cath which showed normal coronary issues. She will follow up with her PCP for other causes of shortness of breath.    2.  Hypertension: Today in the office her blood pressure is controlled on current medications. Previous lipid panel shows good control.     Educated patient on exercising for at least 30 minutes a day for 2 to 3 days a week. Importance of controlling hypertension and blood pressure checkup on the regular basis has been explained. Hypertension as a silent killer has been discussed. Risk reduction of the weight and regular exercises to control the hypertension has been explained.    3.  Palpitations: She states she sometimes has palpitations. She will stop caffeine. Over all controlled. She does not want to do a monitor.    4.  Obesity: BMI 33.81.Significant risk of obesity to CAD,     HTN has been explained. Advantages of wt reduction has been explained.    5. History of CVA: She is on Plavix.             No diagnosis found.    There are no diagnoses linked to this encounter.    COUNSELING:    Cherelle Hines was given to patient for the following topics: diagnostic results, risk factor reductions, impressions, risks and benefits of treatment options and importance of treatment compliance .       SMOKING COUNSELING:    She will follow up in 6 months, unless she needs to be seen sooner.     Sincerely,   CHERYL Sauceda  [facility]  03/23/21      .   12:01 EDT      EMR Dragon/Transcription disclaimer:    Much of this encounter note is an electronic transcription/translation of spoken language to printed text. The electronic translation of spoken language may permit erroneous, or at times, nonsensical words or phrases to be inadvertently transcribed; Although I have reviewed the note for such errors, some may still exist.

## 2023-05-31 ENCOUNTER — PRE-ADMISSION TESTING (OUTPATIENT)
Dept: PREADMISSION TESTING | Facility: HOSPITAL | Age: 63
End: 2023-05-31
Payer: MEDICARE

## 2023-05-31 VITALS
TEMPERATURE: 97.7 F | OXYGEN SATURATION: 95 % | HEART RATE: 71 BPM | HEIGHT: 64 IN | WEIGHT: 193 LBS | SYSTOLIC BLOOD PRESSURE: 95 MMHG | BODY MASS INDEX: 32.95 KG/M2 | DIASTOLIC BLOOD PRESSURE: 55 MMHG | RESPIRATION RATE: 20 BRPM

## 2023-05-31 LAB
ANION GAP SERPL CALCULATED.3IONS-SCNC: 11.7 MMOL/L (ref 5–15)
BUN SERPL-MCNC: 17 MG/DL (ref 8–23)
BUN/CREAT SERPL: 17.7 (ref 7–25)
CALCIUM SPEC-SCNC: 8.9 MG/DL (ref 8.6–10.5)
CHLORIDE SERPL-SCNC: 109 MMOL/L (ref 98–107)
CO2 SERPL-SCNC: 22.3 MMOL/L (ref 22–29)
CREAT SERPL-MCNC: 0.96 MG/DL (ref 0.57–1)
DEPRECATED RDW RBC AUTO: 42.5 FL (ref 37–54)
EGFRCR SERPLBLD CKD-EPI 2021: 66.6 ML/MIN/1.73
ERYTHROCYTE [DISTWIDTH] IN BLOOD BY AUTOMATED COUNT: 13.8 % (ref 12.3–15.4)
GLUCOSE SERPL-MCNC: 121 MG/DL (ref 65–99)
HCT VFR BLD AUTO: 32.6 % (ref 34–46.6)
HGB BLD-MCNC: 10.5 G/DL (ref 12–15.9)
MCH RBC QN AUTO: 27.2 PG (ref 26.6–33)
MCHC RBC AUTO-ENTMCNC: 32.2 G/DL (ref 31.5–35.7)
MCV RBC AUTO: 84.5 FL (ref 79–97)
PLATELET # BLD AUTO: 227 10*3/MM3 (ref 140–450)
PMV BLD AUTO: 10.1 FL (ref 6–12)
POTASSIUM SERPL-SCNC: 3.8 MMOL/L (ref 3.5–5.2)
RBC # BLD AUTO: 3.86 10*6/MM3 (ref 3.77–5.28)
SODIUM SERPL-SCNC: 143 MMOL/L (ref 136–145)
WBC NRBC COR # BLD: 5.84 10*3/MM3 (ref 3.4–10.8)

## 2023-05-31 PROCEDURE — 80048 BASIC METABOLIC PNL TOTAL CA: CPT

## 2023-05-31 PROCEDURE — 36415 COLL VENOUS BLD VENIPUNCTURE: CPT

## 2023-05-31 PROCEDURE — 85027 COMPLETE CBC AUTOMATED: CPT

## 2023-05-31 RX ORDER — MELATONIN
1000 DAILY
COMMUNITY

## 2023-05-31 RX ORDER — CHLORAL HYDRATE 500 MG
1000 CAPSULE ORAL
COMMUNITY

## 2023-05-31 RX ORDER — HYDROCODONE BITARTRATE AND ACETAMINOPHEN 10; 325 MG/1; MG/1
1 TABLET ORAL 4 TIMES DAILY
COMMUNITY

## 2023-05-31 RX ORDER — ALBUTEROL SULFATE 90 UG/1
2 AEROSOL, METERED RESPIRATORY (INHALATION) EVERY 4 HOURS PRN
COMMUNITY

## 2023-05-31 RX ORDER — TAMSULOSIN HYDROCHLORIDE 0.4 MG/1
1 CAPSULE ORAL DAILY
COMMUNITY

## 2023-05-31 NOTE — DISCHARGE INSTRUCTIONS
Take the following medications the morning of surgery with a small sip of water:    Dulera   macrobid  levothyroxine   propanolol   sertraline   protonix      If you are on prescription narcotic pain medication to control your pain you may also take that medication the morning of surgery.    General Instructions:  Do not eat or drink anything after midnight the night before surgery.  Infants may have breast milk up to four hours before surgery.  Infants drinking formula may drink formula up to six hours before surgery.   Patients who avoid smoking, chewing tobacco and alcohol for 4 weeks prior to surgery have a reduced risk of post-operative complications.  Quit smoking as many days before surgery as you can.  Do not smoke, use chewing tobacco or drink alcohol the day of surgery.   If applicable bring your C-PAP/ BI-PAP machine in with you to preop day of surgery.  Bring any papers given to you in the doctor’s office.  Wear clean comfortable clothes.  Do not wear contact lenses, false eyelashes or make-up.  Bring a case for your glasses.   Bring crutches or walker if applicable.  Remove all piercings.  Leave jewelry and any other valuables at home.  Hair extensions with metal clips must be removed prior to surgery.  The Pre-Admission Testing nurse will instruct you to bring medications if unable to obtain an accurate list in Pre-Admission Testing.        If you were given a blood bank ID arm band remember to bring it with you the day of surgery.    Preventing a Surgical Site Infection:  For 2 to 3 days before surgery, avoid shaving with a razor because the razor can irritate skin and make it easier to develop an infection.    Any areas of open skin can increase the risk of a post-operative wound infection by allowing bacteria to enter and travel throughout the body.  Notify your surgeon if you have any skin wounds / rashes even if it is not near the expected surgical site.  The area will need assessed to determine  if surgery should be delayed until it is healed.  The night prior to surgery shower using a fresh bar of anti-bacterial soap (such as Dial) and clean washcloth.  Sleep in a clean bed with clean clothing.  Do not allow pets to sleep with you.  Shower on the morning of surgery using a fresh bar of anti-bacterial soap (such as Dial) and clean washcloth.  Dry with a clean towel and dress in clean clothing.  Ask your surgeon if you will be receiving antibiotics prior to surgery.  Make sure you, your family, and all healthcare providers clean their hands with soap and water or an alcohol based hand  before caring for you or your wound.    Day of surgery:6/7/2023   2 pm  Your arrival time is approximately two hours before your scheduled surgery time.  Upon arrival, a Pre-op nurse and Anesthesiologist will review your health history, obtain vital signs, and answer questions you may have.  The only belongings needed at this time will be your home medications and if applicable your C-PAP/BI-PAP machine.  A Pre-op nurse will start an IV and you may receive medication in preparation for surgery, including something to help you relax.      Please be aware that surgery does come with discomfort.  We want to make every effort to control your discomfort so please discuss any uncontrolled symptoms with your nurse.   Your doctor will most likely have prescribed pain medications.      If you are going home after surgery you will receive individualized written care instructions before being discharged.  A responsible adult must drive you to and from the hospital on the day of your surgery and stay with you for 24 hours.  Discharge prescriptions can be filled by the hospital pharmacy during regular pharmacy hours.  If you are having surgery late in the day/evening your prescription may be e-prescribed to your pharmacy.  Please verify your pharmacy hours or chose a 24 hour pharmacy to avoid not having access to your prescription  because your pharmacy has closed for the day.    If you are staying overnight following surgery, you will be transported to your hospital room following the recovery period.  [facility] has all private rooms.    If you have any questions please call Pre-Admission Testing at [phone].  Deductibles and co-payments are collected on the day of service. Please be prepared to pay the required co-pay, deductible or deposit on the day of service as defined by your plan.    Call your surgeon immediately if you experience any of the following symptoms:  Sore Throat  Shortness of Breath or difficulty breathing  Cough  Chills  Body soreness or muscle pain  Headache  Fever  New loss of taste or smell  Do not arrive for your surgery ill.  Your procedure will need to be rescheduled to another time.  You will need to call your physician before the day of surgery to avoid any unnecessary exposure to hospital staff as well as other patients.

## 2023-06-07 ENCOUNTER — ANESTHESIA (OUTPATIENT)
Dept: PERIOP | Facility: HOSPITAL | Age: 63
End: 2023-06-07
Payer: MEDICAID

## 2023-06-07 ENCOUNTER — ANESTHESIA EVENT (OUTPATIENT)
Dept: PERIOP | Facility: HOSPITAL | Age: 63
End: 2023-06-07
Payer: MEDICAID

## 2023-06-07 ENCOUNTER — HOSPITAL ENCOUNTER (OUTPATIENT)
Facility: HOSPITAL | Age: 63
Setting detail: HOSPITAL OUTPATIENT SURGERY
Discharge: HOME | End: 2023-06-07
Attending: UROLOGY | Admitting: UROLOGY
Payer: MEDICAID

## 2023-06-07 ENCOUNTER — APPOINTMENT (OUTPATIENT)
Dept: GENERAL RADIOLOGY | Facility: HOSPITAL | Age: 63
End: 2023-06-07
Payer: MEDICAID

## 2023-06-07 VITALS
TEMPERATURE: 97.8 F | SYSTOLIC BLOOD PRESSURE: 135 MMHG | OXYGEN SATURATION: 98 % | RESPIRATION RATE: 16 BRPM | HEART RATE: 59 BPM | DIASTOLIC BLOOD PRESSURE: 76 MMHG

## 2023-06-07 DIAGNOSIS — N13.39 OTHER HYDRONEPHROSIS: Primary | ICD-10-CM

## 2023-06-07 PROBLEM — N13.30 HYDRONEPHROSIS: Status: ACTIVE | Noted: 2023-06-07

## 2023-06-07 PROCEDURE — C1769 GUIDE WIRE: HCPCS | Performed by: UROLOGY

## 2023-06-07 PROCEDURE — 25010000002 PROPOFOL 10 MG/ML EMULSION: Performed by: ANESTHESIOLOGY

## 2023-06-07 PROCEDURE — 25010000002 CEFAZOLIN IN DEXTROSE 2-4 GM/100ML-% SOLUTION: Performed by: UROLOGY

## 2023-06-07 PROCEDURE — C2617 STENT, NON-COR, TEM W/O DEL: HCPCS | Performed by: UROLOGY

## 2023-06-07 PROCEDURE — 25010000002 FENTANYL CITRATE (PF) 50 MCG/ML SOLUTION: Performed by: ANESTHESIOLOGY

## 2023-06-07 PROCEDURE — 25010000002 MIDAZOLAM PER 1 MG: Performed by: ANESTHESIOLOGY

## 2023-06-07 PROCEDURE — 74420 UROGRAPHY RTRGR +-KUB: CPT

## 2023-06-07 DEVICE — URETERAL STENT
Type: IMPLANTABLE DEVICE | Site: VAGINA | Status: FUNCTIONAL
Brand: CONTOUR™

## 2023-06-07 RX ORDER — GLYCOPYRROLATE 0.2 MG/ML
INJECTION INTRAMUSCULAR; INTRAVENOUS AS NEEDED
Status: DISCONTINUED | OUTPATIENT
Start: 2023-06-07 | End: 2023-06-07 | Stop reason: SURG

## 2023-06-07 RX ORDER — ONDANSETRON 2 MG/ML
4 INJECTION INTRAMUSCULAR; INTRAVENOUS ONCE AS NEEDED
Status: DISCONTINUED | OUTPATIENT
Start: 2023-06-07 | End: 2023-06-07 | Stop reason: HOSPADM

## 2023-06-07 RX ORDER — LABETALOL HYDROCHLORIDE 5 MG/ML
5 INJECTION, SOLUTION INTRAVENOUS
Status: DISCONTINUED | OUTPATIENT
Start: 2023-06-07 | End: 2023-06-07 | Stop reason: HOSPADM

## 2023-06-07 RX ORDER — MIDAZOLAM HYDROCHLORIDE 1 MG/ML
1 INJECTION INTRAMUSCULAR; INTRAVENOUS
Status: COMPLETED | OUTPATIENT
Start: 2023-06-07 | End: 2023-06-07

## 2023-06-07 RX ORDER — FLUMAZENIL 0.1 MG/ML
0.2 INJECTION INTRAVENOUS AS NEEDED
Status: DISCONTINUED | OUTPATIENT
Start: 2023-06-07 | End: 2023-06-07 | Stop reason: HOSPADM

## 2023-06-07 RX ORDER — CEPHALEXIN 500 MG/1
500 CAPSULE ORAL 3 TIMES DAILY
Qty: 21 CAPSULE | Refills: 0 | Status: SHIPPED | OUTPATIENT
Start: 2023-06-07 | End: 2023-06-14

## 2023-06-07 RX ORDER — NALOXONE HCL 0.4 MG/ML
0.2 VIAL (ML) INJECTION AS NEEDED
Status: DISCONTINUED | OUTPATIENT
Start: 2023-06-07 | End: 2023-06-07 | Stop reason: HOSPADM

## 2023-06-07 RX ORDER — FAMOTIDINE 10 MG/ML
20 INJECTION, SOLUTION INTRAVENOUS ONCE
Status: COMPLETED | OUTPATIENT
Start: 2023-06-07 | End: 2023-06-07

## 2023-06-07 RX ORDER — EPHEDRINE SULFATE 50 MG/ML
INJECTION, SOLUTION INTRAVENOUS AS NEEDED
Status: DISCONTINUED | OUTPATIENT
Start: 2023-06-07 | End: 2023-06-07 | Stop reason: SURG

## 2023-06-07 RX ORDER — PROPOFOL 10 MG/ML
VIAL (ML) INTRAVENOUS AS NEEDED
Status: DISCONTINUED | OUTPATIENT
Start: 2023-06-07 | End: 2023-06-07 | Stop reason: SURG

## 2023-06-07 RX ORDER — PHENAZOPYRIDINE HYDROCHLORIDE 200 MG/1
200 TABLET, FILM COATED ORAL 3 TIMES DAILY PRN
Qty: 30 TABLET | Refills: 0 | Status: SHIPPED | OUTPATIENT
Start: 2023-06-07

## 2023-06-07 RX ORDER — OMEPRAZOLE 20 MG/1
20 CAPSULE, DELAYED RELEASE ORAL DAILY
COMMUNITY

## 2023-06-07 RX ORDER — PHENAZOPYRIDINE HYDROCHLORIDE 200 MG/1
200 TABLET, FILM COATED ORAL ONCE AS NEEDED
Status: COMPLETED | OUTPATIENT
Start: 2023-06-07 | End: 2023-06-07

## 2023-06-07 RX ORDER — IPRATROPIUM BROMIDE AND ALBUTEROL SULFATE 2.5; .5 MG/3ML; MG/3ML
3 SOLUTION RESPIRATORY (INHALATION) ONCE AS NEEDED
Status: DISCONTINUED | OUTPATIENT
Start: 2023-06-07 | End: 2023-06-07 | Stop reason: HOSPADM

## 2023-06-07 RX ORDER — HYDRALAZINE HYDROCHLORIDE 20 MG/ML
5 INJECTION INTRAMUSCULAR; INTRAVENOUS
Status: DISCONTINUED | OUTPATIENT
Start: 2023-06-07 | End: 2023-06-07 | Stop reason: HOSPADM

## 2023-06-07 RX ORDER — SODIUM CHLORIDE, SODIUM LACTATE, POTASSIUM CHLORIDE, CALCIUM CHLORIDE 600; 310; 30; 20 MG/100ML; MG/100ML; MG/100ML; MG/100ML
9 INJECTION, SOLUTION INTRAVENOUS CONTINUOUS
Status: DISCONTINUED | OUTPATIENT
Start: 2023-06-07 | End: 2023-06-07 | Stop reason: HOSPADM

## 2023-06-07 RX ORDER — DROPERIDOL 2.5 MG/ML
0.62 INJECTION, SOLUTION INTRAMUSCULAR; INTRAVENOUS
Status: DISCONTINUED | OUTPATIENT
Start: 2023-06-07 | End: 2023-06-07 | Stop reason: HOSPADM

## 2023-06-07 RX ORDER — LIDOCAINE HYDROCHLORIDE 10 MG/ML
0.5 INJECTION, SOLUTION EPIDURAL; INFILTRATION; INTRACAUDAL; PERINEURAL ONCE AS NEEDED
Status: DISCONTINUED | OUTPATIENT
Start: 2023-06-07 | End: 2023-06-07 | Stop reason: HOSPADM

## 2023-06-07 RX ORDER — CALCIUM CHLORIDE 100 MG/ML
INJECTION INTRAVENOUS; INTRAVENTRICULAR AS NEEDED
Status: DISCONTINUED | OUTPATIENT
Start: 2023-06-07 | End: 2023-06-07 | Stop reason: SURG

## 2023-06-07 RX ORDER — CEFAZOLIN SODIUM 2 G/100ML
2 INJECTION, SOLUTION INTRAVENOUS ONCE
Status: COMPLETED | OUTPATIENT
Start: 2023-06-07 | End: 2023-06-07

## 2023-06-07 RX ORDER — FENTANYL CITRATE 50 UG/ML
50 INJECTION, SOLUTION INTRAMUSCULAR; INTRAVENOUS ONCE AS NEEDED
Status: DISCONTINUED | OUTPATIENT
Start: 2023-06-07 | End: 2023-06-07 | Stop reason: HOSPADM

## 2023-06-07 RX ORDER — FENTANYL CITRATE 50 UG/ML
25 INJECTION, SOLUTION INTRAMUSCULAR; INTRAVENOUS
Status: DISCONTINUED | OUTPATIENT
Start: 2023-06-07 | End: 2023-06-07 | Stop reason: HOSPADM

## 2023-06-07 RX ORDER — LIDOCAINE HYDROCHLORIDE 20 MG/ML
INJECTION, SOLUTION INFILTRATION; PERINEURAL AS NEEDED
Status: DISCONTINUED | OUTPATIENT
Start: 2023-06-07 | End: 2023-06-07 | Stop reason: SURG

## 2023-06-07 RX ORDER — HYDROCODONE BITARTRATE AND ACETAMINOPHEN 10; 325 MG/1; MG/1
1 TABLET ORAL ONCE AS NEEDED
Status: COMPLETED | OUTPATIENT
Start: 2023-06-07 | End: 2023-06-07

## 2023-06-07 RX ORDER — SODIUM CHLORIDE 0.9 % (FLUSH) 0.9 %
3 SYRINGE (ML) INJECTION EVERY 12 HOURS SCHEDULED
Status: DISCONTINUED | OUTPATIENT
Start: 2023-06-07 | End: 2023-06-07 | Stop reason: HOSPADM

## 2023-06-07 RX ORDER — SODIUM CHLORIDE 0.9 % (FLUSH) 0.9 %
3-10 SYRINGE (ML) INJECTION AS NEEDED
Status: DISCONTINUED | OUTPATIENT
Start: 2023-06-07 | End: 2023-06-07 | Stop reason: HOSPADM

## 2023-06-07 RX ORDER — EPHEDRINE SULFATE 50 MG/ML
5 INJECTION, SOLUTION INTRAVENOUS ONCE AS NEEDED
Status: DISCONTINUED | OUTPATIENT
Start: 2023-06-07 | End: 2023-06-07 | Stop reason: HOSPADM

## 2023-06-07 RX ADMIN — MIDAZOLAM 1 MG: 1 INJECTION INTRAMUSCULAR; INTRAVENOUS at 16:37

## 2023-06-07 RX ADMIN — FENTANYL CITRATE 25 MCG: 50 INJECTION, SOLUTION INTRAMUSCULAR; INTRAVENOUS at 18:30

## 2023-06-07 RX ADMIN — HYDROCODONE BITARTRATE AND ACETAMINOPHEN 1 TABLET: 10; 325 TABLET ORAL at 18:49

## 2023-06-07 RX ADMIN — PROPOFOL 200 MG: 10 INJECTION, EMULSION INTRAVENOUS at 17:21

## 2023-06-07 RX ADMIN — GLYCOPYRROLATE 0.2 MG: 0.2 INJECTION INTRAMUSCULAR; INTRAVENOUS at 17:31

## 2023-06-07 RX ADMIN — CEFAZOLIN SODIUM 2 G: 2 INJECTION, SOLUTION INTRAVENOUS at 17:09

## 2023-06-07 RX ADMIN — FENTANYL CITRATE 25 MCG: 50 INJECTION, SOLUTION INTRAMUSCULAR; INTRAVENOUS at 18:39

## 2023-06-07 RX ADMIN — FAMOTIDINE 20 MG: 10 INJECTION INTRAVENOUS at 13:44

## 2023-06-07 RX ADMIN — CALCIUM CHLORIDE 1 G: 100 INJECTION, SOLUTION INTRAVENOUS at 17:28

## 2023-06-07 RX ADMIN — SODIUM CHLORIDE, POTASSIUM CHLORIDE, SODIUM LACTATE AND CALCIUM CHLORIDE 9 ML/HR: 600; 310; 30; 20 INJECTION, SOLUTION INTRAVENOUS at 13:44

## 2023-06-07 RX ADMIN — LIDOCAINE HYDROCHLORIDE 100 MG: 20 INJECTION, SOLUTION INFILTRATION; PERINEURAL at 17:21

## 2023-06-07 RX ADMIN — PHENAZOPYRIDINE 200 MG: 200 TABLET ORAL at 18:40

## 2023-06-07 RX ADMIN — MIDAZOLAM 1 MG: 1 INJECTION INTRAMUSCULAR; INTRAVENOUS at 13:45

## 2023-06-07 RX ADMIN — EPHEDRINE SULFATE 20 MG: 50 INJECTION INTRAVENOUS at 17:30

## 2023-06-07 NOTE — ANESTHESIA PROCEDURE NOTES
Airway  Urgency: elective    Date/Time: 6/7/2023 5:23 PM  End Time:6/7/2023 5:23 PM  Airway not difficult    General Information and Staff    Patient location during procedure: OR  Anesthesiologist: Arnol Hollins MD    Indications and Patient Condition  Indications for airway management: airway protection    Preoxygenated: yes  Mask difficulty assessment: 0 - not attempted    Final Airway Details  Final airway type: supraglottic airway      Successful airway: classic  Size 4     Number of attempts at approach: 1  Assessment: lips, teeth, and gum same as pre-op and atraumatic intubation

## 2023-06-07 NOTE — OP NOTE
URETEROSCOPY LASER LITHOTRIPSY WITH STENT INSERTION  Procedure Note    Cherelle Ayala  6/7/2023    Pre-op Diagnosis:   Right Hydronephrosis    Post-op Diagnosis:     Post-Op Diagnosis Codes:     * Hydronephrosis due to obstruction of ureter [N13.1]    Procedure(s):  RIGHT URETEROSCOPY CYSTOSCOPY RETOGRADE PYELOGRAM WITH STENT PLACEMENT    Surgeon(s):  Arnol Hernandez MD    Anesthesia: General    Staff:   Circulator: Adriana Morelos RN  Scrub Person: Jolly Arndt    Estimated Blood Loss: none    Specimens:                * No orders in the log *      Drains: * No LDAs found *    Findings: Mild right hydronephrosis.  Normal ureter no evidence of any tumor or stones.  Mild UPJ narrowing.  No definitive stone seen.  Stent placed with out tether    Complications: None apparent    Indications: 62-year-old female with right hydronephrosis now presents for cystoscopy retrogrades and right ureteroscopy.    Procedure: Patient was taken the operative suite given general endotracheal anesthesia.  Placed lithotomy.  Prepped and draped in a sterile fashion.  Surgical timeout was performed.  Panendoscopy was performed.  The bladder was unremarkable.  No urothelial carcinoma was seen in the bladder.  The right orifice was cannulated with a Pollick cath and a retrograde pyelogram was performed.  This showed a normal ureter with some narrowing at the UPJ and some fullness of the pelvis.  A guidewire was passed up.  The flexible ureteroscope was passed up.  I visualized the entire ureter.  No stones are seen no tumor was seen.  She had some narrowing of the UPJ and it took a little bit of push to get the ureteroscope up into the pelvis and I visualized the pelvis and all the calyces and infundibulum.  I could not appreciate any stones or tumors or any evidence of any pathology within the pelvis itself.  I suspect she might have some UPJ narrowing but seems very mild.  A guidewire was replaced.  A 6 Romansh by 24 cm  stent was placed in the right collecting system.  We will leave this in there for a few weeks see if this alleviates her pain.  She was awoken and taken to recovery in stable condition.      Arnol Hernandez MD     Date: 6/7/2023  Time: 18:08 EDT

## 2023-06-07 NOTE — H&P
[facility] Surgical History and Physical    Patient Care Team:  Manisha Britton APRN as PCP - General (Nurse Practitioner)  Kilo Cook MD as Consulting Physician (Cardiology)  Shawn Cox Jr., MD (General Surgery)    Chief complaint right flank pain    Subjective     Patient is a 62 y.o. female presents with persistent right-sided flank pain.  Intermittently ongoing for over a month now.  She has occasional left-sided flank pain.  She has known atrophy of the left kidney.  CT imaging shows a 2 mm stone in the right mid kidney with mild right hydronephrosis and proximal hydroureter all the way to the mid ureteral junction over the iliac vessels or there appears to be an element of tapering.  No obstructive or irritable urinary symptoms currently but history of detrusor instability with prior Botox.  No hematuria.     Review of Systems   The following systems were reviewed and negative;  respiratory, cardiovascular, and gastrointestinal    Past Medical History:   Diagnosis Date    Arthritis     Asthma     Brain TIA 2021    COVID 11/2020    Depression     Disease of thyroid gland     E. coli UTI     pt states colonized on poab    Heart murmur     History of anemia     History of small bowel obstruction     D/T ADHESIONS, HAD MULTIPLE SURGERIES (5)    History of transfusion     no reaction    History of ulcer disease     WITH GI BLEED AND TRANSFUSION    Hyperlipidemia     Hypertension     Kidney stone     Low back pain     Mitral valve prolapse     Osteopenia     Osteoporosis     Renal insufficiency     Tachycardia     FAMILY MD MANAGES, PT DENIES AFIB/FLUTTER HX    Tinnitus     Tinnitus of both ears     Uses contact lenses     UTI (urinary tract infection) 02/22/2017    FINISHED ALL ABX, BACTRIM 2/28/17     Past Surgical History:   Procedure Laterality Date    APPENDECTOMY      BOTOX INJECTION      bladder to control incontinence    CARDIAC CATHETERIZATION N/A 12/15/2020    Procedure: Left  Heart Cath;  Surgeon: Kilo Cook MD;  [facility];  Service: Cardiology;  Laterality: N/A;    CARDIAC CATHETERIZATION N/A 12/15/2020    Procedure: Left ventriculography;  Surgeon: Kilo Cook MD;  [facility];  Service: Cardiology;  Laterality: N/A;    CARDIAC CATHETERIZATION N/A 12/15/2020    Procedure: Coronary angiography;  Surgeon: Kilo Cook MD;  [facility];  Service: Cardiology;  Laterality: N/A;    CATARACT EXTRACTION EXTRACAPSULAR W/ INTRAOCULAR LENS IMPLANTATION Bilateral     CERVICAL FUSION      CHOLECYSTECTOMY      COLONOSCOPY      CYSTOSCOPY URETEROSCOPY STONE MANIPULATION/EXTRACTION      X3    CYSTOSCOPY W/ URETERAL STENT PLACEMENT Left 04/18/2018    Procedure: CYSTOSCOPY URETERAL STENT INSERTION;  Surgeon: Arnol Dewey MD;  Location: [facility] OR;  Service: Urology    ENDOSCOPY      HYSTERECTOMY      LYSIS OF ABDOMINAL ADHESIONS      MULTIPLE    ROTATOR CUFF REPAIR Left     ULNAR NERVE TRANSPOSITION Left     URETEROSCOPY LASER LITHOTRIPSY WITH STENT INSERTION Left 03/01/2017    Procedure: LT URETEROSCOPY LASER LITHOTRIPSY WITH STENT INSERTION AND STONE BASKET EXTRACTION BILATERAL RETROGRADE PYLOGRAM LASER LITHROTRIPSY;  Surgeon: Arnol Hernandez MD;  Location: [facility] OR;  Service:      Family History   Problem Relation Age of Onset    Heart failure Mother     Heart disease Mother     Heart attack Mother     Hyperlipidemia Father     Heart failure Father     Heart disease Father     Hypertension Sister     Hyperlipidemia Sister     Hypertension Brother     Hyperlipidemia Brother     Hypertension Brother     Malig Hyperthermia Neg Hx      Social History     Tobacco Use    Smoking status: Never    Smokeless tobacco: Never   Vaping Use    Vaping Use: Never used   Substance Use Topics    Alcohol use: No    Drug use: Never       Meds:  Medications Prior to  Admission   Medication Sig Dispense Refill Last Dose    albuterol sulfate  (90 Base) MCG/ACT inhaler Inhale 2 puffs Every 4 (Four) Hours As Needed for Wheezing.   6/7/2023    atorvastatin (LIPITOR) 40 MG tablet Take 1 tablet by mouth Every Night. 7pm   6/6/2023    cholecalciferol (VITAMIN D3) 25 MCG (1000 UT) tablet Take 1 tablet by mouth Daily.   6/6/2023 at 1900    HYDROcodone-acetaminophen (NORCO)  MG per tablet Take 1 tablet by mouth 4 (Four) Times a Day.   6/6/2023 at 2000    mometasone-formoterol (DULERA 200) 200-5 MCG/ACT inhaler Inhale 2 puffs 2 (Two) Times a Day.   6/7/2023 at 0745    nitrofurantoin, macrocrystal-monohydrate, (MACROBID) 100 MG capsule Take 1 capsule by mouth 2 (Two) Times a Day. 28 capsule 0 6/6/2023 at 1900    omeprazole (priLOSEC) 20 MG capsule Take 1 capsule by mouth Daily.   6/7/2023 at 0745    propranolol (INDERAL) 20 MG tablet 1 tablet 3 (Three) Times a Day.   6/7/2023 at 0745    rOPINIRole XL (REQUIP XL) 2 MG 24 hr tablet Take 1 tablet by mouth Every Night. 7 pm   6/6/2023 at 1900    sertraline (ZOLOFT) 50 MG tablet Take 1.5 tablets by mouth Every Morning.   6/7/2023 at 0745    tamsulosin (FLOMAX) 0.4 MG capsule 24 hr capsule Take 1 capsule by mouth Daily. 7 pm   6/6/2023 at 1900    clopidogrel (PLAVIX) 75 MG tablet Take 1 tablet by mouth Daily.   5/24/2023    Omega-3 Fatty Acids (fish oil) 1000 MG capsule capsule Take 1 capsule by mouth Daily With Breakfast.   5/24/2023       Allergies:  Ambien [zolpidem], Benadryl [diphenhydramine], Codeine, Nsaids, Other, Carisoprodol, Cyclobenzaprine, Levaquin [levofloxacin], Metaxalone, Morphine and related, Penicillins, Phenergan [promethazine hcl], Reglan [metoclopramide], and Ultram [tramadol]    Debilities:  None    Objective     Vital Signs  Temp:  [98.3 °F (36.8 °C)] 98.3 °F (36.8 °C)  Heart Rate:  [67-71] 67  Resp:  [18] 18  BP: (111)/(71) 111/71  No intake or output data in the 24 hours ending 06/07/23 1635       Physical  Exam:     General Appearance:    Alert, cooperative, NAD   HEENT:    No trauma, pupils reactive, hearing intact   Back:     No CVA tenderness   Lungs:     Respirations unlabored, no wheezing    Heart:    RRR, intact peripheral pulses   Abdomen:     Soft, NDNT, no masses, no guarding   :  Deferred   Extremities:   No edema, no deformity   Lymphatic:   No neck or groin LAD   Skin:   No bleeding, bruising or rashes   Neuro/Psych:   Orientation intact, mood/affect pleasant, no focal findings     Results Review:    I reviewed the patient's new clinical results.  Results for orders placed or performed in visit on 05/31/23   Basic Metabolic Panel    Specimen: Blood   Result Value Ref Range    Glucose 121 (H) 65 - 99 mg/dL    BUN 17 8 - 23 mg/dL    Creatinine 0.96 0.57 - 1.00 mg/dL    Sodium 143 136 - 145 mmol/L    Potassium 3.8 3.5 - 5.2 mmol/L    Chloride 109 (H) 98 - 107 mmol/L    CO2 22.3 22.0 - 29.0 mmol/L    Calcium 8.9 8.6 - 10.5 mg/dL    BUN/Creatinine Ratio 17.7 7.0 - 25.0    Anion Gap 11.7 5.0 - 15.0 mmol/L    eGFR 66.6 >60.0 mL/min/1.73   CBC (No Diff)    Specimen: Blood   Result Value Ref Range    WBC 5.84 3.40 - 10.80 10*3/mm3    RBC 3.86 3.77 - 5.28 10*6/mm3    Hemoglobin 10.5 (L) 12.0 - 15.9 g/dL    Hematocrit 32.6 (L) 34.0 - 46.6 %    MCV 84.5 79.0 - 97.0 fL    MCH 27.2 26.6 - 33.0 pg    MCHC 32.2 31.5 - 35.7 g/dL    RDW 13.8 12.3 - 15.4 %    RDW-SD 42.5 37.0 - 54.0 fl    MPV 10.1 6.0 - 12.0 fL    Platelets 227 140 - 450 10*3/mm3        Assessment:  Right flank pain with right renal calculus and mild hydronephrosis    Plan:    Cystoscopy retrogrades possible right ureteroscopy and stent placement    I discussed the patient's findings and my recommendations with patient.   Risks, complications, outcomes and alternatives discussed with the patient at the bedside and office.    Arnol Hernandez MD  06/07/23  16:35 EDT

## 2023-06-07 NOTE — ANESTHESIA PREPROCEDURE EVALUATION
Anesthesia Evaluation     Patient summary reviewed and Nursing notes reviewed   NPO Solid Status: > 8 hours  NPO Liquid Status: > 4 hours           Airway   Mallampati: II  TM distance: >3 FB  Neck ROM: full  No difficulty expected  Dental    (+) partials    Pulmonary - normal exam    breath sounds clear to auscultation  (+) asthma,  Cardiovascular - normal exam    ECG reviewed  Rhythm: regular  Rate: normal    (+) hypertension, valvular problems/murmurs murmur and MVPdysrhythmias Tachycardia, hyperlipidemia    ROS comment: Normal stress test 3/20/EF 60%, trace-mild MR by ECHO 3/20  PE comment: No murmurs heard    Neuro/Psych  (+) TIA, psychiatric history Depression    ROS Comment: TIA 2 yrs ago with left weakness, had thrombolytic therapy, no residuals  GI/Hepatic/Renal/Endo    (+) obesity, renal disease stones and CRI, thyroid problem     Musculoskeletal     (+) back pain  Abdominal   (+) obese   Substance History      OB/GYN          Other   arthritis,                     Anesthesia Plan    ASA 3     general     (Probable LMA)  intravenous induction     Anesthetic plan, risks, benefits, and alternatives have been provided, discussed and informed consent has been obtained with: patient.    Plan discussed with CRNA.    CODE STATUS:

## 2023-06-07 NOTE — DISCHARGE INSTRUCTIONS
***You had a pain pill at 6:49 PM.***      Scopolamine Patch  This patch has been applied to the skin behind one of your ears.  It may stay in place up to 24 hours. You may remove it at any time after your surgery; however, it should be removed after you are up and walking around the next day.  This medicine reduces stomach upset. Side effects may include: dry mouth, dizziness, sleepiness, constipation, or upset stomach.  An allergy would show up as: a rash, itching, wheezing or shortness of breath.  Follow these instructions:  Do not drink alcohol, drive or operate machinery while taking this medicine.  Wear only 1 patch at a time. You can leave the patch on for up to 24 hours.  When you remove the patch, fold it in half with the sticky sides together and throw it away. Wash your hands and the area under the patch.  Do not touch your eye with your hand if it has touched the patch.  Wash your hands well before and after touching the patch.  Sit or stand slowly to avoid dizziness.  Call your doctor if you have:  Any sign of allergy  No relief  Trouble passing urine  Any new or severe symptoms

## 2023-06-08 NOTE — ANESTHESIA POSTPROCEDURE EVALUATION
Patient: Cherelle Ayala    Procedure Summary       Date: 06/07/23 Room / Location: [facility] OR 01 / [facility] MAIN OR    Anesthesia Start: 1715 Anesthesia Stop: 1800    Procedure: RIGHT URETEROSCOPY CYSTOSCOPY RETOGRADE PYELOGRAM WITH STENT PLACEMENT (Right) Diagnosis:       Hydronephrosis due to obstruction of ureter      (Right Hydronephrosis)    Surgeons: Arnol Hernandez MD Provider: Arnol Hollins MD    Anesthesia Type: general ASA Status: 3            Anesthesia Type: general    Vitals  Vitals Value Taken Time   /68 06/07/23 1845   Temp 36.6 °C (97.8 °F) 06/07/23 1755   Pulse 64 06/07/23 1856   Resp 16 06/07/23 1845   SpO2 98 % 06/07/23 1856   Vitals shown include unvalidated device data.        Post Anesthesia Care and Evaluation    Patient location during evaluation: bedside  Patient participation: complete - patient participated  Level of consciousness: sleepy but conscious  Pain score: 0  Pain management: adequate    Airway patency: patent  Anesthetic complications: No anesthetic complications    Cardiovascular status: acceptable  Respiratory status: acceptable  Hydration status: acceptable    Comments: /76   Pulse 59   Temp 36.6 °C (97.8 °F) (Oral)   Resp 16   SpO2 98%

## 2024-03-04 ENCOUNTER — HOSPITAL ENCOUNTER (EMERGENCY)
Facility: HOSPITAL | Age: 64
Discharge: LEFT BEFORE DISCHARGE | End: 2024-03-04
Payer: MEDICARE

## 2024-03-04 ENCOUNTER — APPOINTMENT (OUTPATIENT)
Dept: GENERAL RADIOLOGY | Facility: HOSPITAL | Age: 64
End: 2024-03-04
Payer: MEDICARE

## 2024-03-04 VITALS
OXYGEN SATURATION: 96 % | TEMPERATURE: 97.2 F | BODY MASS INDEX: 31.07 KG/M2 | DIASTOLIC BLOOD PRESSURE: 68 MMHG | SYSTOLIC BLOOD PRESSURE: 159 MMHG | RESPIRATION RATE: 16 BRPM | HEIGHT: 64 IN | WEIGHT: 182 LBS | HEART RATE: 65 BPM

## 2024-03-04 LAB
ALBUMIN SERPL-MCNC: 4.3 G/DL (ref 3.5–5.2)
ALBUMIN/GLOB SERPL: 1.6 G/DL
ALP SERPL-CCNC: 69 U/L (ref 39–117)
ALT SERPL W P-5'-P-CCNC: 23 U/L (ref 1–33)
ANION GAP SERPL CALCULATED.3IONS-SCNC: 13 MMOL/L (ref 5–15)
AST SERPL-CCNC: 21 U/L (ref 1–32)
BASOPHILS # BLD AUTO: 0.06 10*3/MM3 (ref 0–0.2)
BASOPHILS NFR BLD AUTO: 0.7 % (ref 0–1.5)
BILIRUB SERPL-MCNC: 0.2 MG/DL (ref 0–1.2)
BUN SERPL-MCNC: 20 MG/DL (ref 8–23)
BUN/CREAT SERPL: 27.8 (ref 7–25)
CALCIUM SPEC-SCNC: 9.5 MG/DL (ref 8.6–10.5)
CHLORIDE SERPL-SCNC: 108 MMOL/L (ref 98–107)
CO2 SERPL-SCNC: 22 MMOL/L (ref 22–29)
CREAT SERPL-MCNC: 0.72 MG/DL (ref 0.57–1)
DEPRECATED RDW RBC AUTO: 46.2 FL (ref 37–54)
EGFRCR SERPLBLD CKD-EPI 2021: 93.5 ML/MIN/1.73
EOSINOPHIL # BLD AUTO: 0.26 10*3/MM3 (ref 0–0.4)
EOSINOPHIL NFR BLD AUTO: 3.2 % (ref 0.3–6.2)
ERYTHROCYTE [DISTWIDTH] IN BLOOD BY AUTOMATED COUNT: 14.7 % (ref 12.3–15.4)
GLOBULIN UR ELPH-MCNC: 2.7 GM/DL
GLUCOSE SERPL-MCNC: 87 MG/DL (ref 65–99)
HCT VFR BLD AUTO: 38.8 % (ref 34–46.6)
HGB BLD-MCNC: 12.5 G/DL (ref 12–15.9)
HOLD SPECIMEN: NORMAL
HOLD SPECIMEN: NORMAL
IMM GRANULOCYTES # BLD AUTO: 0.03 10*3/MM3 (ref 0–0.05)
IMM GRANULOCYTES NFR BLD AUTO: 0.4 % (ref 0–0.5)
LYMPHOCYTES # BLD AUTO: 2.7 10*3/MM3 (ref 0.7–3.1)
LYMPHOCYTES NFR BLD AUTO: 32.9 % (ref 19.6–45.3)
MCH RBC QN AUTO: 27.8 PG (ref 26.6–33)
MCHC RBC AUTO-ENTMCNC: 32.2 G/DL (ref 31.5–35.7)
MCV RBC AUTO: 86.2 FL (ref 79–97)
MONOCYTES # BLD AUTO: 0.36 10*3/MM3 (ref 0.1–0.9)
MONOCYTES NFR BLD AUTO: 4.4 % (ref 5–12)
NEUTROPHILS NFR BLD AUTO: 4.8 10*3/MM3 (ref 1.7–7)
NEUTROPHILS NFR BLD AUTO: 58.4 % (ref 42.7–76)
NRBC BLD AUTO-RTO: 0 /100 WBC (ref 0–0.2)
PLATELET # BLD AUTO: 255 10*3/MM3 (ref 140–450)
PMV BLD AUTO: 9.5 FL (ref 6–12)
POTASSIUM SERPL-SCNC: 3.7 MMOL/L (ref 3.5–5.2)
PROT SERPL-MCNC: 7 G/DL (ref 6–8.5)
RBC # BLD AUTO: 4.5 10*6/MM3 (ref 3.77–5.28)
SODIUM SERPL-SCNC: 143 MMOL/L (ref 136–145)
TROPONIN T SERPL HS-MCNC: 7 NG/L
WBC NRBC COR # BLD AUTO: 8.21 10*3/MM3 (ref 3.4–10.8)
WHOLE BLOOD HOLD COAG: NORMAL
WHOLE BLOOD HOLD SPECIMEN: NORMAL

## 2024-03-04 PROCEDURE — 71045 X-RAY EXAM CHEST 1 VIEW: CPT

## 2024-03-04 PROCEDURE — 93005 ELECTROCARDIOGRAM TRACING: CPT

## 2024-03-04 PROCEDURE — 85025 COMPLETE CBC W/AUTO DIFF WBC: CPT

## 2024-03-04 PROCEDURE — 80053 COMPREHEN METABOLIC PANEL: CPT

## 2024-03-04 PROCEDURE — 84484 ASSAY OF TROPONIN QUANT: CPT

## 2024-03-04 PROCEDURE — 99211 OFF/OP EST MAY X REQ PHY/QHP: CPT

## 2024-03-04 RX ORDER — SODIUM CHLORIDE 0.9 % (FLUSH) 0.9 %
10 SYRINGE (ML) INJECTION AS NEEDED
Status: DISCONTINUED | OUTPATIENT
Start: 2024-03-04 | End: 2024-03-05 | Stop reason: HOSPADM

## 2024-03-04 RX ORDER — ASPIRIN 325 MG
325 TABLET ORAL ONCE
Status: DISCONTINUED | OUTPATIENT
Start: 2024-03-04 | End: 2024-03-05 | Stop reason: HOSPADM

## 2024-03-05 LAB
QT INTERVAL: 421 MS
QTC INTERVAL: 442 MS

## 2024-03-29 ENCOUNTER — APPOINTMENT (OUTPATIENT)
Dept: CT IMAGING | Facility: HOSPITAL | Age: 64
DRG: 684 | End: 2024-03-29
Payer: MEDICARE

## 2024-03-29 ENCOUNTER — APPOINTMENT (OUTPATIENT)
Dept: CARDIOLOGY | Facility: HOSPITAL | Age: 64
DRG: 684 | End: 2024-03-29
Payer: MEDICARE

## 2024-03-29 ENCOUNTER — APPOINTMENT (OUTPATIENT)
Dept: GENERAL RADIOLOGY | Facility: HOSPITAL | Age: 64
DRG: 684 | End: 2024-03-29
Payer: MEDICARE

## 2024-03-29 ENCOUNTER — HOSPITAL ENCOUNTER (INPATIENT)
Facility: HOSPITAL | Age: 64
LOS: 2 days | Discharge: HOME | DRG: 684 | End: 2024-03-31
Attending: EMERGENCY MEDICINE | Admitting: INTERNAL MEDICINE
Payer: MEDICARE

## 2024-03-29 DIAGNOSIS — R55 SYNCOPE, UNSPECIFIED SYNCOPE TYPE: Primary | ICD-10-CM

## 2024-03-29 PROBLEM — E66.9 OBESITY (BMI 30-39.9): Status: ACTIVE | Noted: 2024-03-29

## 2024-03-29 PROBLEM — I05.9 MITRAL VALVE DISEASE: Status: ACTIVE | Noted: 2021-09-27

## 2024-03-29 PROBLEM — N17.9 AKI (ACUTE KIDNEY INJURY): Status: ACTIVE | Noted: 2024-03-29

## 2024-03-29 LAB
ALBUMIN SERPL-MCNC: 4.5 G/DL (ref 3.5–5.2)
ALBUMIN/GLOB SERPL: 2 G/DL
ALP SERPL-CCNC: 84 U/L (ref 39–117)
ALT SERPL W P-5'-P-CCNC: 19 U/L (ref 1–33)
ANION GAP SERPL CALCULATED.3IONS-SCNC: 11 MMOL/L (ref 5–15)
AST SERPL-CCNC: 20 U/L (ref 1–32)
BASOPHILS # BLD AUTO: 0.08 10*3/MM3 (ref 0–0.2)
BASOPHILS NFR BLD AUTO: 1.2 % (ref 0–1.5)
BH CV XLRA MEAS LEFT DIST CCA EDV: 20.8 CM/SEC
BH CV XLRA MEAS LEFT DIST CCA PSV: 82.3 CM/SEC
BH CV XLRA MEAS LEFT DIST ICA EDV: -78.7 CM/SEC
BH CV XLRA MEAS LEFT DIST ICA PSV: -263 CM/SEC
BH CV XLRA MEAS LEFT ICA/CCA RATIO: -2.48
BH CV XLRA MEAS LEFT PROX CCA EDV: 24.3 CM/SEC
BH CV XLRA MEAS LEFT PROX CCA PSV: 106 CM/SEC
BH CV XLRA MEAS LEFT PROX ECA PSV: -108 CM/SEC
BH CV XLRA MEAS LEFT PROX ICA EDV: -39.9 CM/SEC
BH CV XLRA MEAS LEFT PROX ICA PSV: -92.7 CM/SEC
BH CV XLRA MEAS LEFT PROX SCLA PSV: 186 CM/SEC
BH CV XLRA MEAS LEFT VERTEBRAL A PSV: -59.5 CM/SEC
BH CV XLRA MEAS RIGHT DIST CCA EDV: 29.2 CM/SEC
BH CV XLRA MEAS RIGHT DIST CCA PSV: 83.2 CM/SEC
BH CV XLRA MEAS RIGHT DIST ICA EDV: -54.2 CM/SEC
BH CV XLRA MEAS RIGHT DIST ICA PSV: -131 CM/SEC
BH CV XLRA MEAS RIGHT ICA/CCA RATIO: -1.57
BH CV XLRA MEAS RIGHT PROX CCA EDV: 21.1 CM/SEC
BH CV XLRA MEAS RIGHT PROX CCA PSV: 82 CM/SEC
BH CV XLRA MEAS RIGHT PROX ECA PSV: -112 CM/SEC
BH CV XLRA MEAS RIGHT PROX ICA EDV: -44.7 CM/SEC
BH CV XLRA MEAS RIGHT PROX ICA PSV: -101 CM/SEC
BH CV XLRA MEAS RIGHT PROX SCLA PSV: 256 CM/SEC
BH CV XLRA MEAS RIGHT VERTEBRAL A PSV: -55 CM/SEC
BILIRUB SERPL-MCNC: 0.3 MG/DL (ref 0–1.2)
BILIRUB UR QL STRIP: NEGATIVE
BUN SERPL-MCNC: 24 MG/DL (ref 8–23)
BUN/CREAT SERPL: 18.8 (ref 7–25)
CALCIUM SPEC-SCNC: 9.2 MG/DL (ref 8.6–10.5)
CHLORIDE SERPL-SCNC: 105 MMOL/L (ref 98–107)
CHOLEST SERPL-MCNC: 157 MG/DL (ref 0–200)
CLARITY UR: CLEAR
CO2 SERPL-SCNC: 23 MMOL/L (ref 22–29)
COLOR UR: YELLOW
CREAT SERPL-MCNC: 1.28 MG/DL (ref 0.57–1)
DEPRECATED RDW RBC AUTO: 44.4 FL (ref 37–54)
EGFRCR SERPLBLD CKD-EPI 2021: 46.9 ML/MIN/1.73
EOSINOPHIL # BLD AUTO: 0.52 10*3/MM3 (ref 0–0.4)
EOSINOPHIL NFR BLD AUTO: 7.9 % (ref 0.3–6.2)
ERYTHROCYTE [DISTWIDTH] IN BLOOD BY AUTOMATED COUNT: 13.6 % (ref 12.3–15.4)
GLOBULIN UR ELPH-MCNC: 2.2 GM/DL
GLUCOSE SERPL-MCNC: 98 MG/DL (ref 65–99)
GLUCOSE UR STRIP-MCNC: NEGATIVE MG/DL
HCT VFR BLD AUTO: 36.9 % (ref 34–46.6)
HDLC SERPL-MCNC: 46 MG/DL (ref 40–60)
HGB BLD-MCNC: 11.4 G/DL (ref 12–15.9)
HGB UR QL STRIP.AUTO: NEGATIVE
IMM GRANULOCYTES # BLD AUTO: 0.01 10*3/MM3 (ref 0–0.05)
IMM GRANULOCYTES NFR BLD AUTO: 0.2 % (ref 0–0.5)
KETONES UR QL STRIP: ABNORMAL
LDLC SERPL CALC-MCNC: 73 MG/DL (ref 0–100)
LDLC/HDLC SERPL: 1.41 {RATIO}
LEFT ARM BP: NORMAL MMHG
LEUKOCYTE ESTERASE UR QL STRIP.AUTO: NEGATIVE
LYMPHOCYTES # BLD AUTO: 2.58 10*3/MM3 (ref 0.7–3.1)
LYMPHOCYTES NFR BLD AUTO: 39.2 % (ref 19.6–45.3)
MCH RBC QN AUTO: 27.6 PG (ref 26.6–33)
MCHC RBC AUTO-ENTMCNC: 30.9 G/DL (ref 31.5–35.7)
MCV RBC AUTO: 89.3 FL (ref 79–97)
MONOCYTES # BLD AUTO: 0.44 10*3/MM3 (ref 0.1–0.9)
MONOCYTES NFR BLD AUTO: 6.7 % (ref 5–12)
NEUTROPHILS NFR BLD AUTO: 2.95 10*3/MM3 (ref 1.7–7)
NEUTROPHILS NFR BLD AUTO: 44.8 % (ref 42.7–76)
NITRITE UR QL STRIP: NEGATIVE
NRBC BLD AUTO-RTO: 0 /100 WBC (ref 0–0.2)
PH UR STRIP.AUTO: <=5 [PH] (ref 5–8)
PLATELET # BLD AUTO: 243 10*3/MM3 (ref 140–450)
PMV BLD AUTO: 10.3 FL (ref 6–12)
POTASSIUM SERPL-SCNC: 3.9 MMOL/L (ref 3.5–5.2)
PROT SERPL-MCNC: 6.7 G/DL (ref 6–8.5)
PROT UR QL STRIP: NEGATIVE
QT INTERVAL: 396 MS
QTC INTERVAL: 448 MS
RBC # BLD AUTO: 4.13 10*6/MM3 (ref 3.77–5.28)
RIGHT ARM BP: NORMAL MMHG
SODIUM SERPL-SCNC: 139 MMOL/L (ref 136–145)
SP GR UR STRIP: 1.03 (ref 1–1.03)
TRIGL SERPL-MCNC: 231 MG/DL (ref 0–150)
TROPONIN T SERPL HS-MCNC: 10 NG/L
UROBILINOGEN UR QL STRIP: ABNORMAL
VLDLC SERPL-MCNC: 38 MG/DL (ref 5–40)
WBC NRBC COR # BLD AUTO: 6.58 10*3/MM3 (ref 3.4–10.8)

## 2024-03-29 PROCEDURE — 85025 COMPLETE CBC W/AUTO DIFF WBC: CPT | Performed by: NURSE PRACTITIONER

## 2024-03-29 PROCEDURE — 36415 COLL VENOUS BLD VENIPUNCTURE: CPT

## 2024-03-29 PROCEDURE — 80053 COMPREHEN METABOLIC PANEL: CPT | Performed by: NURSE PRACTITIONER

## 2024-03-29 PROCEDURE — 99222 1ST HOSP IP/OBS MODERATE 55: CPT | Performed by: INTERNAL MEDICINE

## 2024-03-29 PROCEDURE — 84484 ASSAY OF TROPONIN QUANT: CPT | Performed by: NURSE PRACTITIONER

## 2024-03-29 PROCEDURE — 99285 EMERGENCY DEPT VISIT HI MDM: CPT

## 2024-03-29 PROCEDURE — 80061 LIPID PANEL: CPT

## 2024-03-29 PROCEDURE — 25810000003 SODIUM CHLORIDE 0.9 % SOLUTION: Performed by: NURSE PRACTITIONER

## 2024-03-29 PROCEDURE — 81003 URINALYSIS AUTO W/O SCOPE: CPT | Performed by: NURSE PRACTITIONER

## 2024-03-29 PROCEDURE — 93005 ELECTROCARDIOGRAM TRACING: CPT

## 2024-03-29 PROCEDURE — 93005 ELECTROCARDIOGRAM TRACING: CPT | Performed by: EMERGENCY MEDICINE

## 2024-03-29 PROCEDURE — 25810000003 SODIUM CHLORIDE 0.9 % SOLUTION

## 2024-03-29 PROCEDURE — 71045 X-RAY EXAM CHEST 1 VIEW: CPT

## 2024-03-29 PROCEDURE — 70450 CT HEAD/BRAIN W/O DYE: CPT

## 2024-03-29 PROCEDURE — 93880 EXTRACRANIAL BILAT STUDY: CPT | Performed by: SURGERY

## 2024-03-29 PROCEDURE — 93880 EXTRACRANIAL BILAT STUDY: CPT

## 2024-03-29 RX ORDER — PANTOPRAZOLE SODIUM 40 MG/1
40 TABLET, DELAYED RELEASE ORAL 2 TIMES DAILY
COMMUNITY

## 2024-03-29 RX ORDER — ACETAMINOPHEN 160 MG/5ML
650 SOLUTION ORAL EVERY 4 HOURS PRN
Status: DISCONTINUED | OUTPATIENT
Start: 2024-03-29 | End: 2024-03-31 | Stop reason: HOSPADM

## 2024-03-29 RX ORDER — NITROFURANTOIN MACROCRYSTALS 50 MG/1
50 CAPSULE ORAL NIGHTLY
COMMUNITY

## 2024-03-29 RX ORDER — POLYETHYLENE GLYCOL 3350 17 G/17G
17 POWDER, FOR SOLUTION ORAL DAILY PRN
Status: DISCONTINUED | OUTPATIENT
Start: 2024-03-29 | End: 2024-03-31 | Stop reason: HOSPADM

## 2024-03-29 RX ORDER — LEVOTHYROXINE SODIUM 0.1 MG/1
100 TABLET ORAL DAILY
COMMUNITY

## 2024-03-29 RX ORDER — AMOXICILLIN 250 MG
2 CAPSULE ORAL 2 TIMES DAILY PRN
Status: DISCONTINUED | OUTPATIENT
Start: 2024-03-29 | End: 2024-03-31 | Stop reason: HOSPADM

## 2024-03-29 RX ORDER — NITROGLYCERIN 0.4 MG/1
0.4 TABLET SUBLINGUAL
Status: DISCONTINUED | OUTPATIENT
Start: 2024-03-29 | End: 2024-03-31 | Stop reason: HOSPADM

## 2024-03-29 RX ORDER — SODIUM CHLORIDE 0.9 % (FLUSH) 0.9 %
10 SYRINGE (ML) INJECTION AS NEEDED
Status: DISCONTINUED | OUTPATIENT
Start: 2024-03-29 | End: 2024-03-31 | Stop reason: HOSPADM

## 2024-03-29 RX ORDER — SODIUM CHLORIDE 9 MG/ML
100 INJECTION, SOLUTION INTRAVENOUS CONTINUOUS
Status: DISCONTINUED | OUTPATIENT
Start: 2024-03-29 | End: 2024-03-31 | Stop reason: HOSPADM

## 2024-03-29 RX ORDER — ACETAMINOPHEN 325 MG/1
650 TABLET ORAL EVERY 4 HOURS PRN
Status: DISCONTINUED | OUTPATIENT
Start: 2024-03-29 | End: 2024-03-31 | Stop reason: HOSPADM

## 2024-03-29 RX ORDER — BISACODYL 10 MG
10 SUPPOSITORY, RECTAL RECTAL DAILY PRN
Status: DISCONTINUED | OUTPATIENT
Start: 2024-03-29 | End: 2024-03-31 | Stop reason: HOSPADM

## 2024-03-29 RX ORDER — BISACODYL 5 MG/1
5 TABLET, DELAYED RELEASE ORAL DAILY PRN
Status: DISCONTINUED | OUTPATIENT
Start: 2024-03-29 | End: 2024-03-31 | Stop reason: HOSPADM

## 2024-03-29 RX ORDER — ONDANSETRON 2 MG/ML
4 INJECTION INTRAMUSCULAR; INTRAVENOUS EVERY 6 HOURS PRN
Status: DISCONTINUED | OUTPATIENT
Start: 2024-03-29 | End: 2024-03-31 | Stop reason: HOSPADM

## 2024-03-29 RX ORDER — ACETAMINOPHEN 650 MG/1
650 SUPPOSITORY RECTAL EVERY 4 HOURS PRN
Status: DISCONTINUED | OUTPATIENT
Start: 2024-03-29 | End: 2024-03-31 | Stop reason: HOSPADM

## 2024-03-29 RX ORDER — ATORVASTATIN CALCIUM 40 MG/1
40 TABLET, FILM COATED ORAL NIGHTLY
Status: DISCONTINUED | OUTPATIENT
Start: 2024-03-29 | End: 2024-03-30

## 2024-03-29 RX ORDER — CHOLECALCIFEROL (VITAMIN D3) 125 MCG
5 CAPSULE ORAL NIGHTLY PRN
Status: DISCONTINUED | OUTPATIENT
Start: 2024-03-29 | End: 2024-03-31 | Stop reason: HOSPADM

## 2024-03-29 RX ORDER — CLOPIDOGREL BISULFATE 75 MG/1
75 TABLET ORAL DAILY
Status: DISCONTINUED | OUTPATIENT
Start: 2024-03-29 | End: 2024-03-30

## 2024-03-29 RX ADMIN — SODIUM CHLORIDE 100 ML/HR: 9 INJECTION, SOLUTION INTRAVENOUS at 07:24

## 2024-03-29 RX ADMIN — CLOPIDOGREL BISULFATE 75 MG: 75 TABLET ORAL at 09:10

## 2024-03-29 RX ADMIN — ACETAMINOPHEN 650 MG: 325 TABLET, FILM COATED ORAL at 15:43

## 2024-03-29 RX ADMIN — SODIUM CHLORIDE 500 ML: 9 INJECTION, SOLUTION INTRAVENOUS at 05:08

## 2024-03-29 RX ADMIN — SODIUM CHLORIDE 100 ML/HR: 9 INJECTION, SOLUTION INTRAVENOUS at 05:09

## 2024-03-29 RX ADMIN — ACETAMINOPHEN 650 MG: 325 TABLET, FILM COATED ORAL at 23:15

## 2024-03-29 RX ADMIN — ATORVASTATIN CALCIUM 40 MG: 40 TABLET, FILM COATED ORAL at 23:07

## 2024-03-29 NOTE — NURSING NOTE
Report given to Donna DEL REAL patient moving to room 380, states she will notify her family of her room change

## 2024-03-29 NOTE — ED PROVIDER NOTES
Subjective   Chief Complaint   Patient presents with    Syncope     Manisha Britton APRN    History of Present Illness  Is a 63-year-old female presents the ED with complaint of multiple syncopal episodes today.  Patient reports that she has been feeling unwell the past 2 to 3 days, she has had dizziness, feeling as though she is going to pass out.  She reports she had a syncopal episode while in the shower, and this happened several times.  She does report she hit her head.     Patient also complains of feeling generally weak.  No fevers.  No nausea vomiting or diarrhea.  She reports urinary frequency.  Denies abdominal pain    Patient denies visual disturbances, speech disturbances, facial droop, unilateral weakness, numbness or tingling.  Denies difficulty walking or lethargy.        Review of Systems   Constitutional:  Negative for chills, diaphoresis, fatigue and fever.   Eyes:  Negative for photophobia and visual disturbance.   Respiratory:  Negative for shortness of breath.    Cardiovascular:  Positive for leg swelling. Negative for chest pain and palpitations.   Gastrointestinal:  Negative for abdominal pain, blood in stool, diarrhea, nausea and vomiting.   Genitourinary:  Positive for frequency.   Musculoskeletal:  Negative for back pain, neck pain and neck stiffness.   Skin:  Negative for color change and rash.   Neurological:  Positive for dizziness, syncope, light-headedness and headaches. Negative for tremors, seizures, speech difficulty, weakness and numbness.       Past Medical History:   Diagnosis Date    Arthritis     Asthma     Brain TIA 2021    COVID 11/2020    Depression     Disease of thyroid gland     E. coli UTI     pt states colonized on poab    Heart murmur     History of anemia     History of small bowel obstruction     D/T ADHESIONS, HAD MULTIPLE SURGERIES (5)    History of transfusion     no reaction    History of ulcer disease     WITH GI BLEED AND TRANSFUSION    Hyperlipidemia      Hypertension     Kidney stone     Low back pain     Mitral valve prolapse     Osteopenia     Osteoporosis     Renal insufficiency     Tachycardia     FAMILY MD MANAGES, PT DENIES AFIB/FLUTTER HX    Tinnitus     Tinnitus of both ears     Uses contact lenses     UTI (urinary tract infection) 02/22/2017    FINISHED ALL ABX, BACTRIM 2/28/17       Allergies   Allergen Reactions    Benadryl [Diphenhydramine] Other (See Comments)     Hypersensitive to RLS    Codeine Itching and Other (See Comments)     Turns red from neck up    Nsaids Other (See Comments)     KIDNEY PROBLEMS    Ambien [Zolpidem] Unknown - High Severity     Sleep walks    Carisoprodol Anxiety    Cyclobenzaprine Anxiety    Levaquin [Levofloxacin] Hives and Rash    Metaxalone Anxiety    Morphine And Related Hives and Rash     Report had a reaction and tried since with no issues.    Other Unknown - High Severity     MUSCLE RELAXERS CAUSE RESTLESSNESS  Hypersensitive to RLS. **6/7/2023 - PATIENT REPORTS SHE IS NOT ALLERGIC TO VERSED**    Penicillins Rash    Phenergan [Promethazine Hcl] Other (See Comments)     Makes pt hypersensitive and increases her RLS S/S    Reglan [Metoclopramide] Other (See Comments)     Makes her hypersensitive  increases s/s RLS    Ultram [Tramadol] Itching and Rash       Past Surgical History:   Procedure Laterality Date    APPENDECTOMY      BOTOX INJECTION      bladder to control incontinence    CARDIAC CATHETERIZATION N/A 12/15/2020    Procedure: Left Heart Cath;  Surgeon: Kilo Cook MD;  Location: [facility] INVASIVE LOCATION;  Service: Cardiology;  Laterality: N/A;    CARDIAC CATHETERIZATION N/A 12/15/2020    Procedure: Left ventriculography;  Surgeon: Kilo Cook MD;  Location: [facility] INVASIVE LOCATION;  Service: Cardiology;  Laterality: N/A;    CARDIAC CATHETERIZATION N/A 12/15/2020    Procedure: Coronary angiography;  Surgeon: Kilo Cook MD;  Location: [facility] INVASIVE LOCATION;   Service: Cardiology;  Laterality: N/A;    CATARACT EXTRACTION EXTRACAPSULAR W/ INTRAOCULAR LENS IMPLANTATION Bilateral     CERVICAL FUSION      CHOLECYSTECTOMY      COLONOSCOPY      CYSTOSCOPY URETEROSCOPY STONE MANIPULATION/EXTRACTION      X3    CYSTOSCOPY W/ URETERAL STENT PLACEMENT Left 04/18/2018    Procedure: CYSTOSCOPY URETERAL STENT INSERTION;  Surgeon: Arnol Dewey MD;  Location: [facility] OR;  Service: Urology    ENDOSCOPY      HYSTERECTOMY      LYSIS OF ABDOMINAL ADHESIONS      MULTIPLE    ROTATOR CUFF REPAIR Left     ULNAR NERVE TRANSPOSITION Left     URETEROSCOPY LASER LITHOTRIPSY WITH STENT INSERTION Left 03/01/2017    Procedure: LT URETEROSCOPY LASER LITHOTRIPSY WITH STENT INSERTION AND STONE BASKET EXTRACTION BILATERAL RETROGRADE PYLOGRAM LASER LITHROTRIPSY;  Surgeon: Arnol Hernandez MD;  Location: [facility] OR;  Service:     URETEROSCOPY LASER LITHOTRIPSY WITH STENT INSERTION Right 6/7/2023    Procedure: RIGHT URETEROSCOPY CYSTOSCOPY RETOGRADE PYELOGRAM WITH STENT PLACEMENT;  Surgeon: Arnol Hernandez MD;  Location: [facility];  Service: Urology;  Laterality: Right;       Family History   Problem Relation Age of Onset    Heart failure Mother     Heart disease Mother     Heart attack Mother     Hyperlipidemia Father     Heart failure Father     Heart disease Father     Hypertension Sister     Hyperlipidemia Sister     Hypertension Brother     Hyperlipidemia Brother     Hypertension Brother     Malig Hyperthermia Neg Hx        Social History     Socioeconomic History    Marital status:    Tobacco Use    Smoking status: Never    Smokeless tobacco: Never   Vaping Use    Vaping status: Never Used   Substance and Sexual Activity    Alcohol use: No    Drug use: Never    Sexual activity: Defer     Birth control/protection: Surgical           Objective   Physical Exam  Vitals and nursing note reviewed.   Constitutional:       Appearance: Normal appearance. She is not  "toxic-appearing.   HENT:      Head: Normocephalic and atraumatic.      Nose: Nose normal.      Mouth/Throat:      Mouth: Mucous membranes are moist.      Pharynx: Oropharynx is clear.   Eyes:      Extraocular Movements: Extraocular movements intact.      Conjunctiva/sclera: Conjunctivae normal.      Pupils: Pupils are equal, round, and reactive to light.   Cardiovascular:      Rate and Rhythm: Normal rate and regular rhythm.      Heart sounds: Normal heart sounds. No murmur heard.     No friction rub. No gallop.   Pulmonary:      Effort: Pulmonary effort is normal.      Breath sounds: Normal breath sounds.   Abdominal:      General: Bowel sounds are normal.   Musculoskeletal:         General: Normal range of motion.      Cervical back: Normal range of motion and neck supple.      Right lower leg: No edema.      Left lower leg: No edema.   Skin:     General: Skin is warm and dry.      Capillary Refill: Capillary refill takes less than 2 seconds.   Neurological:      Mental Status: She is alert and oriented to person, place, and time.      GCS: GCS eye subscore is 4. GCS verbal subscore is 5. GCS motor subscore is 6.      Cranial Nerves: No dysarthria or facial asymmetry.      Sensory: Sensation is intact.      Motor: Motor function is intact.      Comments: Appears generally weak.          Procedures           ED Course  /66 (Patient Position: Lying)   Pulse 73   Temp 98 °F (36.7 °C) (Oral)   Resp 18   Ht 162.6 cm (64\")   Wt 91 kg (200 lb 9.9 oz)   SpO2 95%   BMI 34.44 kg/m²   Medications   sodium chloride 0.9 % flush 10 mL (has no administration in time range)   sodium chloride 0.9 % bolus 500 mL (has no administration in time range)   nitroglycerin (NITROSTAT) SL tablet 0.4 mg (has no administration in time range)   acetaminophen (TYLENOL) tablet 650 mg (has no administration in time range)     Or   acetaminophen (TYLENOL) 160 MG/5ML oral solution 650 mg (has no administration in time range)     Or "   acetaminophen (TYLENOL) suppository 650 mg (has no administration in time range)   ondansetron (ZOFRAN) injection 4 mg (has no administration in time range)   melatonin tablet 5 mg (has no administration in time range)   sennosides-docusate (PERICOLACE) 8.6-50 MG per tablet 2 tablet (has no administration in time range)     And   polyethylene glycol (MIRALAX) packet 17 g (has no administration in time range)     And   bisacodyl (DULCOLAX) EC tablet 5 mg (has no administration in time range)     And   bisacodyl (DULCOLAX) suppository 10 mg (has no administration in time range)   clopidogrel (PLAVIX) tablet 75 mg (has no administration in time range)   sodium chloride 0.9 % infusion (has no administration in time range)     CT Head Without Contrast    Result Date: 3/29/2024  Impression: No acute intracranial process Electronically Signed: Gagandeep Mccrary MD  3/29/2024 3:26 AM EDT  Workstation ID: [id number]    XR Chest 1 View    Result Date: 3/29/2024  Impression: No active disease Electronically Signed: Gagandeep Mccrary MD  3/29/2024 2:19 AM EDT  Workstation ID: [id number]   Lab Results (last 24 hours)       Procedure Component Value Units Date/Time    CBC & Differential [[id number]]  (Abnormal) Collected: 03/29/24 0230    Specimen: Blood Updated: 03/29/24 0337    Narrative:      The following orders were created for panel order CBC & Differential.  Procedure                               Abnormality         Status                     ---------                               -----------         ------                     CBC Auto Differential[[id number]]        Abnormal            Final result                 Please view results for these tests on the individual orders.    Comprehensive Metabolic Panel [[id number]]  (Abnormal) Collected: 03/29/24 0230    Specimen: Blood Updated: 03/29/24 0337     Glucose 98 mg/dL      BUN 24 mg/dL      Creatinine 1.28 mg/dL      Sodium 139 mmol/L      Potassium 3.9 mmol/L      Chloride 105 mmol/L       CO2 23.0 mmol/L      Calcium 9.2 mg/dL      Total Protein 6.7 g/dL      Albumin 4.5 g/dL      ALT (SGPT) 19 U/L      AST (SGOT) 20 U/L      Alkaline Phosphatase 84 U/L      Total Bilirubin 0.3 mg/dL      Globulin 2.2 gm/dL      A/G Ratio 2.0 g/dL      BUN/Creatinine Ratio 18.8     Anion Gap 11.0 mmol/L      eGFR 46.9 mL/min/1.73     Narrative:      GFR Normal >60  Chronic Kidney Disease <60  Kidney Failure <15      CBC Auto Differential [[id number]]  (Abnormal) Collected: 03/29/24 0230    Specimen: Blood Updated: 03/29/24 0337     WBC 6.58 10*3/mm3      RBC 4.13 10*6/mm3      Hemoglobin 11.4 g/dL      Hematocrit 36.9 %      MCV 89.3 fL      MCH 27.6 pg      MCHC 30.9 g/dL      RDW 13.6 %      RDW-SD 44.4 fl      MPV 10.3 fL      Platelets 243 10*3/mm3      Neutrophil % 44.8 %      Lymphocyte % 39.2 %      Monocyte % 6.7 %      Eosinophil % 7.9 %      Basophil % 1.2 %      Immature Grans % 0.2 %      Neutrophils, Absolute 2.95 10*3/mm3      Lymphocytes, Absolute 2.58 10*3/mm3      Monocytes, Absolute 0.44 10*3/mm3      Eosinophils, Absolute 0.52 10*3/mm3      Basophils, Absolute 0.08 10*3/mm3      Immature Grans, Absolute 0.01 10*3/mm3      nRBC 0.0 /100 WBC     Single High Sensitivity Troponin T [[id number]]  (Normal) Collected: 03/29/24 0230    Specimen: Blood Updated: 03/29/24 0337     HS Troponin T 10 ng/L     Narrative:      High Sensitive Troponin T Reference Range:  <14.0 ng/L- Negative Female for AMI  <22.0 ng/L- Negative Male for AMI  >=14 - Abnormal Female indicating possible myocardial injury.  >=22 - Abnormal Male indicating possible myocardial injury.   Clinicians would have to utilize clinical acumen, EKG, Troponin, and serial changes to determine if it is an Acute Myocardial Infarction or myocardial injury due to an underlying chronic condition.         Lipid Panel [[id number]] Collected: 03/29/24 0230    Specimen: Blood Updated: 03/29/24 0452    Urinalysis With Microscopic If Indicated (No Culture)  - Urine, Clean Catch [[id number]]  (Abnormal) Collected: 03/29/24 0415    Specimen: Urine, Clean Catch Updated: 03/29/24 0421     Color, UA Yellow     Appearance, UA Clear     pH, UA <=5.0     Specific Gravity, UA 1.032     Glucose, UA Negative     Ketones, UA Trace     Bilirubin, UA Negative     Blood, UA Negative     Protein, UA Negative     Leuk Esterase, UA Negative     Nitrite, UA Negative     Urobilinogen, UA 0.2 E.U./dL    Narrative:      Urine microscopic not indicated.                                                       Medical Decision Making  Chart Review:   Imaging: CT Head Without Contrast    Result Date: 3/29/2024  Impression: No acute intracranial process Electronically Signed: Gagandeep Mccrary MD  3/29/2024 3:26 AM EDT  Workstation ID: [id number]    XR Chest 1 View    Result Date: 3/29/2024  Impression: No active disease Electronically Signed: Gagandeep Mccrary MD  3/29/2024 2:19 AM EDT  Workstation ID: [id number]  EKG: Sinus rhythm rate of 77.  Left axis deviation.  Compared to previous 3/4/2024.  No acute ST changes.  Corroborated with ED attending physician.  Patient presents to the ED for the above complaint, underwent the above, exam and workup.  Placed on appropriate monitoring.  Differential diagnoses considered for patient presentation, this list is not all inclusive of diagnoses considered: Electrolyte imbalance, arrhythmia, UTI, stroke.  Patient has no focal deficit noted on exam.  Urinary tract infection noted.  Labs reviewed as above.  CT head negative.  EKG noted as above.  Given patient's multiple episodes of syncope she will be admitted for further evaluation.  Doubt stroke as patient has no episodes of weakness, no focal weakness noted on exam.  Discussion/Consultation with other providers: Discussed with PURNIMA Ngo for the hospitalist service.  Disposition: I discussed with the patient their test results, work-up here in the emergency department, and need for admission and further evaluation.  Patient is agreeable to the plan of care. At time of disposition patient's VS are reviewed, and patient without acute distress.  Opportunity was provided for questions at the bedside, all questions and concerns were addressed.  Note Disclaimer: At [facility], we believe that sharing information builds trust and better relationships. You are receiving this note because you recently visited [facility]. It is possible you will see health information before a provider has talked with you about it. This kind of information can be easy to misunderstand. To help you fully understand what it means for your health, we urge you to discuss this note with your provider  Note dictated utilizing Dragon Dictation.  Appropriate PPE worn during patient interactions.        Problems Addressed:  Syncope, unspecified syncope type: complicated acute illness or injury    Amount and/or Complexity of Data Reviewed  Labs: ordered.  Radiology: ordered.  ECG/medicine tests: ordered.    Risk  Prescription drug management.  Decision regarding hospitalization.        Final diagnoses:   Syncope, unspecified syncope type       ED Disposition  ED Disposition       ED Disposition   Decision to Admit    Condition   --    Comment   Level of Care: Telemetry [5]   Diagnosis: Syncope [708316]   Admitting Physician: MYRA BURCH [[id number]]   Attending Physician: MYRA BURCH [[id number]]   Certification: I Certify That Inpatient Hospital Services Are Medically Necessary For Greater Than 2 Midnights                 No follow-up provider specified.       Medication List      No changes were made to your prescriptions during this visit.            Julisa Aquino, APRN  03/29/24 0454

## 2024-03-29 NOTE — CASE MANAGEMENT/SOCIAL WORK
Discharge Planning Assessment   Lakhwinder     Patient Name: Cherelle Ayala  MRN: [id number]  Today's Date: 3/29/2024    Admit Date: 3/29/2024    Plan: Home with family. Currently on O2 (doesn't use this at home)   Discharge Needs Assessment       Row Name 03/29/24 0928       Living Environment    People in Home sibling(s)    Name(s) of People in Home Brother Julián    Current Living Arrangements home    Potentially Unsafe Housing Conditions none    In the past 12 months has the electric, gas, oil, or water company threatened to shut off services in your home? No    Primary Care Provided by self    Provides Primary Care For no one    Family Caregiver if Needed sibling(s)    Family Caregiver Names Brother Julián    Quality of Family Relationships supportive    Able to Return to Prior Arrangements yes       Resource/Environmental Concerns    Resource/Environmental Concerns none    Transportation Concerns none       Transportation Needs    In the past 12 months, has lack of transportation kept you from medical appointments or from getting medications? no    In the past 12 months, has lack of transportation kept you from meetings, work, or from getting things needed for daily living? No       Food Insecurity    Within the past 12 months, you worried that your food would run out before you got the money to buy more. Never true    Within the past 12 months, the food you bought just didn't last and you didn't have money to get more. Never true       Transition Planning    Patient/Family Anticipates Transition to home with family    Patient/Family Anticipated Services at Transition none    Transportation Anticipated family or friend will provide       Discharge Needs Assessment    Equipment Currently Used at Home none    Concerns to be Addressed denies needs/concerns at this time    Anticipated Changes Related to Illness none    Equipment Needed After Discharge other (see comments)  currently on O2, doesn't wear at home                    Discharge Plan       Row Name 03/29/24 0930       Plan    Plan Home with family. Currently on O2 (doesn't use this at home)    Plan Comments Pt reports she lives with brother and is IADLs. She confirms pcp and wants enrolled in meds to bed. She denies difficulty obtaining food/medications/transportation and anticipates her brother will pick her up at dc. She denies dc needs, though is currently on O2. She may need exercise oximetry prior to dc. DC Barriers: Carotid U/S, ECHO, IV fluids                  Continued Care and Services - Admitted Since 3/29/2024    No active coordination exists for this encounter.          Demographic Summary       Row Name 03/29/24 0927       General Information    Admission Type inpatient    Arrived From home    Required Notices Provided Important Message from Medicare    Referral Source admission list    Reason for Consult discharge planning    Preferred Language English       Contact Information    Permission Granted to Share Info With                    Functional Status       Row Name 03/29/24 0927       Functional Status    Usual Activity Tolerance good    Current Activity Tolerance moderate       Functional Status, IADL    Medications independent    Meal Preparation independent    Housekeeping independent    Laundry independent    Shopping independent                     Patient Forms       Row Name 03/29/24 0932       Patient Forms    Important Message from Medicare (IMM) Delivered  IMM 3/29/24 per registration                  Alexandrea Smith RN, [facility]  Office: [phone]  Fax: [phone]  [email or web address]      I met with patient in room wearing PPE: mask and glasses     Maintained distance greater than six feet and spent </=15 minutes in the room    Alexandrea Smith RN

## 2024-03-29 NOTE — PLAN OF CARE
Problem: Adult Inpatient Plan of Care  Goal: Plan of Care Review  Outcome: Ongoing, Progressing  Goal: Patient-Specific Goal (Individualized)  Outcome: Ongoing, Progressing  Goal: Absence of Hospital-Acquired Illness or Injury  Outcome: Ongoing, Progressing  Goal: Optimal Comfort and Wellbeing  Outcome: Ongoing, Progressing  Goal: Readiness for Transition of Care  Outcome: Ongoing, Progressing     Problem: Fall Injury Risk  Goal: Absence of Fall and Fall-Related Injury  Outcome: Ongoing, Progressing     Problem: Breathing Pattern Ineffective  Goal: Effective Breathing Pattern  Outcome: Ongoing, Progressing   Goal Outcome Evaluation:

## 2024-03-29 NOTE — Clinical Note
Level of Care: Telemetry [5]   Admitting Physician: MYRA BURCH [[id number]]   Attending Physician: MYRA BURCH [[id number]]

## 2024-03-29 NOTE — PLAN OF CARE
Problem: Adult Inpatient Plan of Care  Goal: Plan of Care Review  Outcome: Ongoing, Progressing  Flowsheets (Taken 3/29/2024 1134)  Progress: no change  Plan of Care Reviewed With: patient  Outcome Evaluation: new admit   Goal Outcome Evaluation:  Plan of Care Reviewed With: patient        Progress: no change  Outcome Evaluation: new admit

## 2024-03-29 NOTE — CONSULTS
Referring Provider: Trevin Donald MD    Reason for Consultation: Syncope      Patient Care Team:  Manisha Britton APRN as PCP - General (Nurse Practitioner)  Kilo Cook MD as Consulting Physician (Cardiology)  Shawn Cox Jr., MD (General Surgery)      SUBJECTIVE     Chief Complaint: Syncope    History of present illness:  Cherelle Ayala is a 63 y.o. female with hypertension, hyperlipidemia, obesity who presented to the hospital with complaints of syncope.  Patient has been feeling unwell for the last few days and also reports dizziness and lightheadedness.  This was an unwitnessed episode and she reports losing consciousness at least 3 times.  In the ER ECG showed normal sinus rhythm and troponin was negative.  Creatinine was elevated at 1.3.  Vital signs were essentially unremarkable.    Review of systems:    Constitutional: + weakness, fatigue, fever, rigors, chills   Eyes: No vision changes, eye pain   ENT/oropharynx: No difficulty swallowing, sore throat, epistaxis, changes in hearing   Cardiovascular: No chest pain, chest tightness, palpitations, paroxysmal nocturnal dyspnea, orthopnea, diaphoresis, + dizziness / syncopal episode   Respiratory: No shortness of breath, dyspnea on exertion, cough, wheezing, hemoptysis   Gastrointestinal: No abdominal pain, nausea, vomiting, diarrhea, bloody stools   Genitourinary: No hematuria, dysuria   Neurological: No headache, tremors, numbness, one-sided weakness    Musculoskeletal: No cramps, myalgias, joint pain, joint swelling   Integument: No rash, edema        Personal History:      Past Medical History:   Diagnosis Date    Arthritis     Asthma     Brain TIA 2021    COVID 11/2020    Depression     Disease of thyroid gland     E. coli UTI     pt states colonized on poab    Heart murmur     History of anemia     History of small bowel obstruction     D/T ADHESIONS, HAD MULTIPLE SURGERIES (5)    History of transfusion     no reaction     History of ulcer disease     WITH GI BLEED AND TRANSFUSION    Hyperlipidemia     Hypertension     Kidney stone     Low back pain     Mitral valve prolapse     Osteopenia     Osteoporosis     Renal insufficiency     Tachycardia     FAMILY MD MANAGES, PT DENIES AFIB/FLUTTER HX    Tinnitus     Tinnitus of both ears     Uses contact lenses     UTI (urinary tract infection) 02/22/2017    FINISHED ALL ABX, BACTRIM 2/28/17       Past Surgical History:   Procedure Laterality Date    APPENDECTOMY      BOTOX INJECTION      bladder to control incontinence    CARDIAC CATHETERIZATION N/A 12/15/2020    Procedure: Left Heart Cath;  Surgeon: Kilo Cook MD;  Location: [facility] CATH INVASIVE LOCATION;  Service: Cardiology;  Laterality: N/A;    CARDIAC CATHETERIZATION N/A 12/15/2020    Procedure: Left ventriculography;  Surgeon: Kilo Cook MD;  Location: [facility] INVASIVE LOCATION;  Service: Cardiology;  Laterality: N/A;    CARDIAC CATHETERIZATION N/A 12/15/2020    Procedure: Coronary angiography;  Surgeon: Kilo Cook MD;  Location: [facility] INVASIVE LOCATION;  Service: Cardiology;  Laterality: N/A;    CATARACT EXTRACTION EXTRACAPSULAR W/ INTRAOCULAR LENS IMPLANTATION Bilateral     CERVICAL FUSION      CHOLECYSTECTOMY      COLONOSCOPY      CYSTOSCOPY URETEROSCOPY STONE MANIPULATION/EXTRACTION      X3    CYSTOSCOPY W/ URETERAL STENT PLACEMENT Left 04/18/2018    Procedure: CYSTOSCOPY URETERAL STENT INSERTION;  Surgeon: Arnol Dewey MD;  Location: [facility];  Service: Urology    ENDOSCOPY      HYSTERECTOMY      LYSIS OF ABDOMINAL ADHESIONS      MULTIPLE    ROTATOR CUFF REPAIR Left     ULNAR NERVE TRANSPOSITION Left     URETEROSCOPY LASER LITHOTRIPSY WITH STENT INSERTION Left 03/01/2017    Procedure: LT URETEROSCOPY LASER LITHOTRIPSY WITH STENT INSERTION AND STONE BASKET EXTRACTION BILATERAL RETROGRADE PYLOGRAM LASER LITHROTRIPSY;  Surgeon: Arnol Hernandez MD;  Location:   [facility];  Service:     URETEROSCOPY LASER LITHOTRIPSY WITH STENT INSERTION Right 6/7/2023    Procedure: RIGHT URETEROSCOPY CYSTOSCOPY RETOGRADE PYELOGRAM WITH STENT PLACEMENT;  Surgeon: Arnol Hernandez MD;  Location:  [facility];  Service: Urology;  Laterality: Right;       Family History   Problem Relation Age of Onset    Heart failure Mother     Heart disease Mother     Heart attack Mother     Hyperlipidemia Father     Heart failure Father     Heart disease Father     Hypertension Sister     Hyperlipidemia Sister     Hypertension Brother     Hyperlipidemia Brother     Hypertension Brother     Malig Hyperthermia Neg Hx        Social History     Tobacco Use    Smoking status: Never    Smokeless tobacco: Never   Vaping Use    Vaping status: Never Used   Substance Use Topics    Alcohol use: No    Drug use: Never        Home meds:  Prior to Admission medications    Medication Sig Start Date End Date Taking? Authorizing Provider   albuterol sulfate  (90 Base) MCG/ACT inhaler Inhale 2 puffs Every 4 (Four) Hours As Needed for Wheezing.    Tatyana Turk MD   atorvastatin (LIPITOR) 40 MG tablet Take 1 tablet by mouth Every Night. 7pm 12/8/20   Tatyana Turk MD   cholecalciferol (VITAMIN D3) 25 MCG (1000 UT) tablet Take 1 tablet by mouth Daily.    Tatyana Turk MD   clopidogrel (PLAVIX) 75 MG tablet Take 1 tablet by mouth Daily.    Tatyana Turk MD   HYDROcodone-acetaminophen (NORCO)  MG per tablet Take 1 tablet by mouth 4 (Four) Times a Day.    Tatyana Turk MD   mometasone-formoterol (DULERA 200) 200-5 MCG/ACT inhaler Inhale 2 puffs 2 (Two) Times a Day.    Tatyana Turk MD   nitrofurantoin, macrocrystal-monohydrate, (MACROBID) 100 MG capsule Take 1 capsule by mouth 2 (Two) Times a Day. 4/20/18   Arnol Hernandez MD   Omega-3 Fatty Acids (fish oil) 1000 MG capsule capsule Take 1 capsule by mouth Daily With Breakfast.    Tatyana Turk MD  "  omeprazole (priLOSEC) 20 MG capsule Take 1 capsule by mouth Daily.    Tatyana Turk MD   phenazopyridine (Pyridium) 200 MG tablet Take 1 tablet by mouth 3 (Three) Times a Day As Needed for Bladder Spasms. 6/7/23   Arnol Hernandez MD   propranolol (INDERAL) 20 MG tablet 1 tablet 3 (Three) Times a Day. 10/19/20   Tatyana Turk MD   rOPINIRole XL (REQUIP XL) 2 MG 24 hr tablet Take 1 tablet by mouth Every Night. 7 pm 2/14/20   Tatyana Turk MD   sertraline (ZOLOFT) 50 MG tablet Take 1.5 tablets by mouth Every Morning.    Tatyana Turk MD   tamsulosin (FLOMAX) 0.4 MG capsule 24 hr capsule Take 1 capsule by mouth Daily. 7 pm    Tatyana Turk MD       Allergies:     Benadryl [diphenhydramine], Codeine, Nsaids, Ambien [zolpidem], Carisoprodol, Cyclobenzaprine, Levaquin [levofloxacin], Metaxalone, Morphine and related, Other, Penicillins, Phenergan [promethazine hcl], Reglan [metoclopramide], and Ultram [tramadol]    Scheduled Meds:clopidogrel, 75 mg, Oral, Daily      Continuous Infusions:sodium chloride, 100 mL/hr, Last Rate: 100 mL/hr (03/29/24 0724)      PRN Meds:  acetaminophen **OR** acetaminophen **OR** acetaminophen    senna-docusate sodium **AND** polyethylene glycol **AND** bisacodyl **AND** bisacodyl    melatonin    nitroglycerin    ondansetron    [COMPLETED] Insert Peripheral IV **AND** sodium chloride      OBJECTIVE    Vital Signs  Vitals:    03/29/24 0601 03/29/24 0615 03/29/24 0630 03/29/24 0701   BP: 110/57 108/44 110/52 111/51   BP Location:       Patient Position:       Pulse: 80 81 83 82   Resp:       Temp:       TempSrc:       SpO2: 94% 95% 95% 95%   Weight:       Height:           Flowsheet Rows      Flowsheet Row First Filed Value   Admission Height 162.6 cm (64\") Documented at 03/29/2024 0058   Admission Weight 91 kg (200 lb 9.9 oz) Documented at 03/29/2024 0058              Intake/Output Summary (Last 24 hours) at 3/29/2024 7006  Last data filed at " 3/29/2024 0724  Gross per 24 hour   Intake 1000 ml   Output --   Net 1000 ml        Telemetry: Sinus rhythm    Physical Exam:  The patient is alert, oriented and in no distress.  Somnolent  Vital signs as noted above.  Head and neck revealed no carotid bruits or jugular venous distention.  No thyromegaly or lymphadenopathy is present  Lungs clear.  No wheezing.  Breath sounds are normal bilaterally.  Heart: Normal first and second heart sounds. No murmur.  No precordial rub is present.  No gallop is present.  Abdomen: Soft and nontender.  No organomegaly is present.  Extremities with good peripheral pulses with 1+ pedal edema bilateral lower extremities.  Skin: Warm and dry.  Musculoskeletal system is grossly normal.  CNS grossly normal.       Results Review:  I have personally reviewed the results from the time of this admission to 3/29/2024 07:27 EDT and agree with these findings:  []  Laboratory  []  Microbiology  []  Radiology  []  EKG/Telemetry   []  Cardiology/Vascular   []  Pathology  []  Old records  []  Other:    Most notable findings include:     Lab Results (last 24 hours)       Procedure Component Value Units Date/Time    Lipid Panel [[id number]]  (Abnormal) Collected: 03/29/24 0230    Specimen: Blood Updated: 03/29/24 0513     Total Cholesterol 157 mg/dL      Triglycerides 231 mg/dL      HDL Cholesterol 46 mg/dL      LDL Cholesterol  73 mg/dL      VLDL Cholesterol 38 mg/dL      LDL/HDL Ratio 1.41    Narrative:      Cholesterol Reference Ranges  (U.S. Department of Health and Human Services ATP III Classifications)    Desirable          <200 mg/dL  Borderline High    200-239 mg/dL  High Risk          >240 mg/dL      Triglyceride Reference Ranges  (U.S. Department of Health and Human Services ATP III Classifications)    Normal           <150 mg/dL  Borderline High  150-199 mg/dL  High             200-499 mg/dL  Very High        >500 mg/dL    HDL Reference Ranges  (U.S. Department of Health and Human  Services ATP III Classifications)    Low     <40 mg/dl (major risk factor for CHD)  High    >60 mg/dl ('negative' risk factor for CHD)        LDL Reference Ranges  (U.S. Department of Health and Human Services ATP III Classifications)    Optimal          <100 mg/dL  Near Optimal     100-129 mg/dL  Borderline High  130-159 mg/dL  High             160-189 mg/dL  Very High        >189 mg/dL    Urinalysis With Microscopic If Indicated (No Culture) - Urine, Clean Catch [[id number]]  (Abnormal) Collected: 03/29/24 0415    Specimen: Urine, Clean Catch Updated: 03/29/24 0421     Color, UA Yellow     Appearance, UA Clear     pH, UA <=5.0     Specific Gravity, UA 1.032     Glucose, UA Negative     Ketones, UA Trace     Bilirubin, UA Negative     Blood, UA Negative     Protein, UA Negative     Leuk Esterase, UA Negative     Nitrite, UA Negative     Urobilinogen, UA 0.2 E.U./dL    Narrative:      Urine microscopic not indicated.    CBC & Differential [[id number]]  (Abnormal) Collected: 03/29/24 0230    Specimen: Blood Updated: 03/29/24 0337    Narrative:      The following orders were created for panel order CBC & Differential.  Procedure                               Abnormality         Status                     ---------                               -----------         ------                     CBC Auto Differential[[id number]]        Abnormal            Final result                 Please view results for these tests on the individual orders.    CBC Auto Differential [[id number]]  (Abnormal) Collected: 03/29/24 0230    Specimen: Blood Updated: 03/29/24 0337     WBC 6.58 10*3/mm3      RBC 4.13 10*6/mm3      Hemoglobin 11.4 g/dL      Hematocrit 36.9 %      MCV 89.3 fL      MCH 27.6 pg      MCHC 30.9 g/dL      RDW 13.6 %      RDW-SD 44.4 fl      MPV 10.3 fL      Platelets 243 10*3/mm3      Neutrophil % 44.8 %      Lymphocyte % 39.2 %      Monocyte % 6.7 %      Eosinophil % 7.9 %      Basophil % 1.2 %      Immature Grans % 0.2 %       Neutrophils, Absolute 2.95 10*3/mm3      Lymphocytes, Absolute 2.58 10*3/mm3      Monocytes, Absolute 0.44 10*3/mm3      Eosinophils, Absolute 0.52 10*3/mm3      Basophils, Absolute 0.08 10*3/mm3      Immature Grans, Absolute 0.01 10*3/mm3      nRBC 0.0 /100 WBC     Comprehensive Metabolic Panel [[id number]]  (Abnormal) Collected: 03/29/24 0230    Specimen: Blood Updated: 03/29/24 0337     Glucose 98 mg/dL      BUN 24 mg/dL      Creatinine 1.28 mg/dL      Sodium 139 mmol/L      Potassium 3.9 mmol/L      Chloride 105 mmol/L      CO2 23.0 mmol/L      Calcium 9.2 mg/dL      Total Protein 6.7 g/dL      Albumin 4.5 g/dL      ALT (SGPT) 19 U/L      AST (SGOT) 20 U/L      Alkaline Phosphatase 84 U/L      Total Bilirubin 0.3 mg/dL      Globulin 2.2 gm/dL      A/G Ratio 2.0 g/dL      BUN/Creatinine Ratio 18.8     Anion Gap 11.0 mmol/L      eGFR 46.9 mL/min/1.73     Narrative:      GFR Normal >60  Chronic Kidney Disease <60  Kidney Failure <15      Single High Sensitivity Troponin T [[id number]]  (Normal) Collected: 03/29/24 0230    Specimen: Blood Updated: 03/29/24 0337     HS Troponin T 10 ng/L     Narrative:      High Sensitive Troponin T Reference Range:  <14.0 ng/L- Negative Female for AMI  <22.0 ng/L- Negative Male for AMI  >=14 - Abnormal Female indicating possible myocardial injury.  >=22 - Abnormal Male indicating possible myocardial injury.   Clinicians would have to utilize clinical acumen, EKG, Troponin, and serial changes to determine if it is an Acute Myocardial Infarction or myocardial injury due to an underlying chronic condition.                 Imaging Results (Last 24 Hours)       Procedure Component Value Units Date/Time    CT Head Without Contrast [[id number]] Collected: 03/29/24 0323     Updated: 03/29/24 0328    Narrative:      CT HEAD WO CONTRAST    Date of Exam: 3/29/2024 3:08 AM EDT    Indication: fall.    Comparison: None available.    Technique: Axial CT images were obtained of the head without  contrast administration.  Coronal reconstructions were performed.  Automated exposure control and iterative reconstruction methods were used.      Findings:  There is no evidence of hemorrhage. There is no mass effect or midline shift.    There is no extracerebral collection.    Ventricles are normal in size and configuration for patient's stated age.      Posterior fossa is within normal limits.    Calvarium and skull base appear intact.   Visualized sinuses show no air fluid levels. Visualized orbits are unremarkable.      Impression:      Impression:  No acute intracranial process      Electronically Signed: Gagandeep Mccrary MD    3/29/2024 3:26 AM EDT    Workstation ID: [id number]    XR Chest 1 View [[id number]] Collected: 03/29/24 0218     Updated: 03/29/24 0223    Narrative:      XR CHEST 1 VW    Date of Exam: 3/29/2024 1:45 AM EDT    Indication: syncope    Comparison: Chest radiograph dated March 4, 2024    Findings:  There are no airspace consolidations. No pleural fluid. No pneumothorax. The pulmonary vasculature appears within normal limits. The cardiac and mediastinal silhouette appear unremarkable. No acute osseous abnormality identified.      Impression:      Impression:  No active disease      Electronically Signed: Gagandeep Mccrary MD    3/29/2024 2:19 AM EDT    Workstation ID: [id number]            LAB RESULTS (LAST 7 DAYS)    CBC  Results from last 7 days   Lab Units 03/29/24  0230   WBC 10*3/mm3 6.58   RBC 10*6/mm3 4.13   HEMOGLOBIN g/dL 11.4*   HEMATOCRIT % 36.9   MCV fL 89.3   PLATELETS 10*3/mm3 243       BMP  Results from last 7 days   Lab Units 03/29/24  0230   SODIUM mmol/L 139   POTASSIUM mmol/L 3.9   CHLORIDE mmol/L 105   CO2 mmol/L 23.0   BUN mg/dL 24*   CREATININE mg/dL 1.28*   GLUCOSE mg/dL 98       CMP   Results from last 7 days   Lab Units 03/29/24  0230   SODIUM mmol/L 139   POTASSIUM mmol/L 3.9   CHLORIDE mmol/L 105   CO2 mmol/L 23.0   BUN mg/dL 24*   CREATININE mg/dL 1.28*   GLUCOSE mg/dL 98    ALBUMIN g/dL 4.5   BILIRUBIN mg/dL 0.3   ALK PHOS U/L 84   AST (SGOT) U/L 20   ALT (SGPT) U/L 19       BNP        TROPONIN  Results from last 7 days   Lab Units 03/29/24  0230   HSTROP T ng/L 10       CoAg        Creatinine Clearance  Estimated Creatinine Clearance: 48.5 mL/min (A) (by C-G formula based on SCr of 1.28 mg/dL (H)).    ABG          Radiology  CT Head Without Contrast    Result Date: 3/29/2024  Impression: No acute intracranial process Electronically Signed: Gagandeep Mccrary MD  3/29/2024 3:26 AM EDT  Workstation ID: [id number]    XR Chest 1 View    Result Date: 3/29/2024  Impression: No active disease Electronically Signed: Gagandeep Mccrary MD  3/29/2024 2:19 AM EDT  Workstation ID: [id number]       EKG  I personally viewed and interpreted the patient's EKG/Telemetry data:  ECG 12 Lead Syncope   Final Result   HEART RATE= 77  bpm   RR Interval= 780  ms   IL Interval= 167  ms   P Horizontal Axis= 12  deg   P Front Axis= 42  deg   QRSD Interval= 93  ms   QT Interval= 396  ms   QTcB= 448  ms   QRS Axis= -41  deg   T Wave Axis= 3  deg   - OTHERWISE NORMAL ECG -   Sinus rhythm   Low voltage, precordial leads   LAD   LVA   When compared with ECG of 04-Mar-2024 20:24:22,   Significant axis, voltage or hypertrophy change   Electronically Signed By: Zaki Yu ([facility]) 29-Mar-2024 07:05:49   Date and Time of Study: 2024-03-29 01:32:49            Echocardiogram:    Results for orders placed in visit on 02/24/20    Adult Transthoracic Echo Complete W/ Cont if Necessary Per Protocol    Interpretation Summary  · Mild tricuspid valve regurgitation is present.  · There is calcification of the aortic valve.  · Left atrial cavity size is borderline dilated.  · Calculated EF = 66%  · There is no evidence of pericardial effusion.        Stress Test:  Results for orders placed in visit on 02/24/20    Stress Test With Myocardial Perfusion One Day    Interpretation Summary  · Findings consistent with a normal ECG stress test.  ·  Left ventricular ejection fraction is normal (Calculated EF = 60%).  · Myocardial perfusion imaging indicates a normal myocardial perfusion study with no evidence of ischemia.  · Impressions are consistent with a low risk study.  · Very small inferior ischemia seen on polar view , does not appears to be clinically significant    Asymptomatic for chest pain. ECG is negative for ischemia.  Ectopy: none  B/P is appropriate for Beta-blocker therapy.  Pharmacologic study due to inability to tolerate increasing speed and grade of treadmill due to c/o low back pain and Beta-blocker therapy.  Unable to participate in low level exercise due to c/o low back pain.  Supervised by:  Smiley LUNA        Cardiac Catheterization:  Results for orders placed during the hospital encounter of 12/15/20    Cardiac Catheterization/Vascular Study    Narrative  Table formatting from the original result was not included.    · Successful right and left coronary angiogram and LV gram  · Normal coronary arteries  · Normal LV gram    December 15, 2020    Cherelle Ayala  1960  60 y.o.  female  [id number]    Procedures Performed    1. Left heart catheterization  2. Selective coronary angiography  3. Left ventriculography        :   Kilo Cook MD    Vascular Access Site: Right radial    Indication for procedure: Positive stress test    Referring Physician:      Pertinent History    Past Medical History:  Diagnosis Date   Arthritis   Heart murmur   History of anemia   History of small bowel obstruction  D/T ADHESIONS, HAD MULTIPLE SURGERIES (5)   History of transfusion   History of ulcer disease  WITH GI BLEED AND TRANSFUSION   Hyperlipidemia   Kidney stone   Low back pain   Mitral valve prolapse   Renal insufficiency   Tachycardia  FAMILY MD MANAGES, PT DENIES AFIB/FLUTTER HX   Tinnitus of both ears   Uses contact lenses   UTI (urinary tract infection) 02/22/2017  FINISHED ALL ABX, BACTRIM 2/28/17    Past  Surgical History:  Procedure Laterality Date   APPENDECTOMY   CERVICAL FUSION   CHOLECYSTECTOMY   CYSTOSCOPY URETEROSCOPY STONE MANIPULATION/EXTRACTION  X3   CYSTOSCOPY W/ URETERAL STENT PLACEMENT Left 4/18/2018  Procedure: CYSTOSCOPY URETERAL STENT INSERTION;  Surgeon: Arnol Dewey MD;  Location: [facility];  Service: Urology   HYSTERECTOMY   LYSIS OF ABDOMINAL ADHESIONS  MULTIPLE   ROTATOR CUFF REPAIR   ULNAR NERVE TRANSPOSITION Left   URETEROSCOPY LASER LITHOTRIPSY WITH STENT INSERTION Left 3/1/2017  Procedure: LT URETEROSCOPY LASER LITHOTRIPSY WITH STENT INSERTION AND STONE BASKET EXTRACTION BILATERAL RETROGRADE PYLOGRAM LASER LITHROTRIPSY;  Surgeon: Arnol Hernandez MD;  Location: [facility];  Service:      Procedure Note    After discussing the risks, benefits, and alternatives of the procedure, informed consent was obtained.  The vascular access site was prepped and draped in the usual sterile fashion.  2% lidocaine was used for local anesthesia. Appropriate landmarks were assessed.  A 5 Arabic short sheath was inserted in the artery using the modified Seldinger technique.    Selective coronary angiography was performed with JL4 and JR4 diagnostic catheters. Left ventriculogram  was performed with an angled pigtail catheter.  All exchanges were performed over the wire.  There were no apparent acute or early complications.  The patient tolerated the procedure well and was transferred to the recovery area in stable condition.    Artery hemostasis was achieved with manual pressure.        Contrast:    79  cc  Complications:  None  Blood Loss: minimal      Hemodynamics    Pressures    Ao:    120/80 mmHg  LV:    120/12 mmHg  End-diastolic pressure:  12 mmHg  No significant aortic valvular gradient on pullback    Coronary Angiography    Left Main:  The left main originates in the left coronary cusp.  It bifurcates and gives rise to the LAD and circumflex system.  Normal left main    Left  Anterior Descending: Normal left anterior descending including the diagonal and  branches    Left Circumflex: Nondominant and normal    Right Coronary Artery:  The right coronary artery originates in the right coronary cusp.  Dominant and normal    Left Ventriculography:    Estimated Ejection Fraction: 60 %  Wall motion abnormalities:  None  Mitral Regurgitation:  None    Impression:    1. Normal coronary arteries  2. Normal LV gram    Recommendations:    1. Medical management        I sincerely appreciate the opportunity to participate in your patient's care. Please feel free to contact me anytime if I can be of assistance in this or any other way.    Kilo Cook MD  12/15/2020  11:40 EST        Other:      ASSESSMENT & PLAN:    Principal Problem:    Syncope  Active Problems:    Essential hypertension    Syncope  CT head unremarkable  Vital signs normal and ECG plus troponins are unremarkable  Orthostatics negative for hypotension or tachycardia  Obtain an echocardiogram  Will provide MCOT at the time of discharge if telemonitoring is unrevealing.  Vasovagal syncope high on the differential.    Acute kidney injury  Creatinine 1.28 compared to 0.7 at baseline  Provide hydration  Dehydration may have contributed to syncope as well    Hypertension  Blood pressure is currently well-controlled.    Hyperlipidemia  LDL 73, HDL 46, triglyceride 231 and total cholesterol 157  Resume high intensity statin    Obesity  BMI 34.4  Lifestyle modifications recommended to the patient  Screening and treatment for sleep apnea recommended.    Toribio Mata MD  03/29/24  07:28 EDT

## 2024-03-29 NOTE — H&P
"[facility] Medicine Services  History & Physical    Patient Name: Cherelle Ayala  : 1960  MRN: [id number]  Primary Care Physician:  Manisha Britton APRN  Date of admission: 3/29/2024  Date and Time of Service: 3/29/2024 at 0435      Assessment & Plan      Chief Complaint: syncope    Plan:    Home medications not verified at the time of assessment and plan    Syncope  -CT of the head reviewed, no acute abnormality  -Orthostatic BP normal  -EKG reviewed, SR, HR 77  -Lipid panel ordered  -IV fluids ordered   -meclizine as needed  -Continuous cardiac monitoring  -Fall precautions  -2D echo ordered  -Cardiology consult    JACQUIE  -Creatinine 1.28 (baseline 0.72 ), monitor  -IV fluids ordered  -Avoid nephrotoxic medication and contrast  -Avoid hypotension  -Consider Nephrology consult if creatinine does not improve      History of Present Illness     History of Present Illness: Cherelle Ayala is a 63 y.o. female with a previous medical history of CAD, HTN, and Hyperlipidemia who presented to [facility] on 3/29/2024 with complaints of multiple episodes of syncope today, some of which occurred in the shower, she reports hitting her head.  She also has felt \"unwell\" for the past 2-3 days and dizziness.    In the ED, troponin was 10, creatinine 1.28 (baseline 0.72), otherwise, labs are unremarkable.  EKG shows SR, HR 77. CT of the head showed no acute abnormality.  Orthostatic BP was normal. She is afebrile, pulse is also stable. Hospitalist was consulted for further management of her syncopal episodes.    12 point ROS reviewed and negative except as mentioned above    Objective      Vitals:   Temp:  [98 °F (36.7 °C)] 98 °F (36.7 °C)  Heart Rate:  [73-87] 73  Resp:  [18] 18  BP: (118-122)/(63-66) 118/66  Body mass index is 34.44 kg/m².    Physical Exam  Vitals and nursing note reviewed.   Constitutional:       General: She is sleeping.      Appearance: Normal appearance. She is obese.   HENT:    "   Mouth/Throat:      Mouth: Mucous membranes are moist.   Cardiovascular:      Rate and Rhythm: Normal rate and regular rhythm.   Pulmonary:      Effort: Pulmonary effort is normal.   Abdominal:      Palpations: Abdomen is soft.   Musculoskeletal:         General: Normal range of motion.   Skin:     General: Skin is warm and dry.   Neurological:      General: No focal deficit present.      Mental Status: She is oriented to person, place, and time and easily aroused. Mental status is at baseline.   Psychiatric:         Mood and Affect: Mood normal.         Behavior: Behavior normal.        Personal History     This is a 63 y.o. female with:    Past Medical History:   Diagnosis Date    Arthritis     Asthma     Brain TIA 2021    COVID 11/2020    Depression     Disease of thyroid gland     E. coli UTI     pt states colonized on poab    Heart murmur     History of anemia     History of small bowel obstruction     D/T ADHESIONS, HAD MULTIPLE SURGERIES (5)    History of transfusion     no reaction    History of ulcer disease     WITH GI BLEED AND TRANSFUSION    Hyperlipidemia     Hypertension     Kidney stone     Low back pain     Mitral valve prolapse     Osteopenia     Osteoporosis     Renal insufficiency     Tachycardia     FAMILY MD MANAGES, PT DENIES AFIB/FLUTTER HX    Tinnitus     Tinnitus of both ears     Uses contact lenses     UTI (urinary tract infection) 02/22/2017    FINISHED ALL ABX, BACTRIM 2/28/17       Past Surgical History:   Procedure Laterality Date    APPENDECTOMY      BOTOX INJECTION      bladder to control incontinence    CARDIAC CATHETERIZATION N/A 12/15/2020    Procedure: Left Heart Cath;  Surgeon: Kilo Cook MD;  Location:  [facility] CATH INVASIVE [facility]ATION;  Service: Cardiology;  Laterality: N/A;    CARDIAC CATHETERIZATION N/A 12/15/2020    Procedure: Left ventriculography;  Surgeon: Kilo Cook MD;  Location:  [facility]VAS[facility];  Service: Cardiology;  Laterality: N/A;     CARDIAC CATHETERIZATION N/A 12/15/2020    Procedure: Coronary angiography;  Surgeon: Kilo Cook MD;  Location: [facility] CATH INVASIVE LOCATION;  Service: Cardiology;  Laterality: N/A;    CATARACT EXTRACTION EXTRACAPSULAR W/ INTRAOCULAR LENS IMPLANTATION Bilateral     CERVICAL FUSION      CHOLECYSTECTOMY      COLONOSCOPY      CYSTOSCOPY URETEROSCOPY STONE MANIPULATION/EXTRACTION      X3    CYSTOSCOPY W/ URETERAL STENT PLACEMENT Left 04/18/2018    Procedure: CYSTOSCOPY URETERAL STENT INSERTION;  Surgeon: Arnol Dewey MD;  Location: [facility] OR;  Service: Urology    ENDOSCOPY      HYSTERECTOMY      LYSIS OF ABDOMINAL ADHESIONS      MULTIPLE    ROTATOR CUFF REPAIR Left     ULNAR NERVE TRANSPOSITION Left     URETEROSCOPY LASER LITHOTRIPSY WITH STENT INSERTION Left 03/01/2017    Procedure: LT URETEROSCOPY LASER LITHOTRIPSY WITH STENT INSERTION AND STONE BASKET EXTRACTION BILATERAL RETROGRADE PYLOGRAM LASER LITHROTRIPSY;  Surgeon: Arnol Hernandez MD;  Location: [facility] OR;  Service:     URETEROSCOPY LASER LITHOTRIPSY WITH STENT INSERTION Right 6/7/2023    Procedure: RIGHT URETEROSCOPY CYSTOSCOPY RETOGRADE PYELOGRAM WITH STENT PLACEMENT;  Surgeon: Arnol Hernandez MD;  Location: [facility] OR;  Service: Urology;  Laterality: Right;       Active and Resolved Problems  There are no hospital problems to display for this patient.      Family History: family history includes Heart attack in her mother; Heart disease in her father and mother; Heart failure in her father and mother; Hyperlipidemia in her brother, father, and sister; Hypertension in her brother, brother, and sister. Otherwise pertinent FHx was reviewed and not pertinent to current issue.    Social History:  reports that she has never smoked. She has never used smokeless tobacco. She reports that she does not drink alcohol and does not use drugs.    Home Medications:  Prior to Admission Medications       Prescriptions  Last Dose Informant Patient Reported? Taking?    albuterol sulfate  (90 Base) MCG/ACT inhaler   Yes No    Inhale 2 puffs Every 4 (Four) Hours As Needed for Wheezing.    atorvastatin (LIPITOR) 40 MG tablet  Self Yes No    Take 1 tablet by mouth Every Night. 7pm    cholecalciferol (VITAMIN D3) 25 MCG (1000 UT) tablet   Yes No    Take 1 tablet by mouth Daily.    clopidogrel (PLAVIX) 75 MG tablet   Yes No    Take 1 tablet by mouth Daily.    HYDROcodone-acetaminophen (NORCO)  MG per tablet   Yes No    Take 1 tablet by mouth 4 (Four) Times a Day.    mometasone-formoterol (DULERA 200) 200-5 MCG/ACT inhaler   Yes No    Inhale 2 puffs 2 (Two) Times a Day.    nitrofurantoin, macrocrystal-monohydrate, (MACROBID) 100 MG capsule   No No    Take 1 capsule by mouth 2 (Two) Times a Day.    Omega-3 Fatty Acids (fish oil) 1000 MG capsule capsule   Yes No    Take 1 capsule by mouth Daily With Breakfast.    omeprazole (priLOSEC) 20 MG capsule   Yes No    Take 1 capsule by mouth Daily.    phenazopyridine (Pyridium) 200 MG tablet   No No    Take 1 tablet by mouth 3 (Three) Times a Day As Needed for Bladder Spasms.    propranolol (INDERAL) 20 MG tablet   Yes No    1 tablet 3 (Three) Times a Day.    rOPINIRole XL (REQUIP XL) 2 MG 24 hr tablet   Yes No    Take 1 tablet by mouth Every Night. 7 pm    sertraline (ZOLOFT) 50 MG tablet   Yes No    Take 1.5 tablets by mouth Every Morning.    tamsulosin (FLOMAX) 0.4 MG capsule 24 hr capsule   Yes No    Take 1 capsule by mouth Daily. 7 pm              Allergies:  Allergies   Allergen Reactions    Benadryl [Diphenhydramine] Other (See Comments)     Hypersensitive to RLS    Codeine Itching and Other (See Comments)     Turns red from neck up    Nsaids Other (See Comments)     KIDNEY PROBLEMS    Ambien [Zolpidem] Unknown - High Severity     Sleep walks    Carisoprodol Anxiety    Cyclobenzaprine Anxiety    Levaquin [Levofloxacin] Hives and Rash    Metaxalone Anxiety    Morphine And Related  Hives and Rash     Report had a reaction and tried since with no issues.    Other Unknown - High Severity     MUSCLE RELAXERS CAUSE RESTLESSNESS  Hypersensitive to RLS. **6/7/2023 - PATIENT REPORTS SHE IS NOT ALLERGIC TO VERSED**    Penicillins Rash    Phenergan [Promethazine Hcl] Other (See Comments)     Makes pt hypersensitive and increases her RLS S/S    Reglan [Metoclopramide] Other (See Comments)     Makes her hypersensitive  increases s/s RLS    Ultram [Tramadol] Itching and Rash           DVT prophylaxis:  No DVT prophylaxis order currently exists.        CODE STATUS:           Admission Status:  I believe this patient meets inpatient status.    I discussed the patient's findings and my recommendations with patient.    Signature:     This document has been electronically signed by Jeni Rivas DNP, APRN on March 29, 2024 04:36 EDT   [facility] Hospitalist Team

## 2024-03-30 ENCOUNTER — APPOINTMENT (OUTPATIENT)
Dept: CARDIOLOGY | Facility: HOSPITAL | Age: 64
DRG: 684 | End: 2024-03-30
Payer: MEDICARE

## 2024-03-30 LAB
ANION GAP SERPL CALCULATED.3IONS-SCNC: 12 MMOL/L (ref 5–15)
AORTIC DIMENSIONLESS INDEX: 0.77 (DI)
BASOPHILS # BLD AUTO: 0.03 10*3/MM3 (ref 0–0.2)
BASOPHILS NFR BLD AUTO: 0.6 % (ref 0–1.5)
BH CV ECHO MEAS - AO MAX PG: 15.8 MMHG
BH CV ECHO MEAS - AO MEAN PG: 9 MMHG
BH CV ECHO MEAS - AO V2 MAX: 199 CM/SEC
BH CV ECHO MEAS - AO V2 VTI: 43.1 CM
BH CV ECHO MEAS - AVA(I,D): 2.12 CM2
BH CV ECHO MEAS - EDV(CUBED): 125 ML
BH CV ECHO MEAS - EDV(MOD-SP2): 56.5 ML
BH CV ECHO MEAS - EDV(MOD-SP4): 66.8 ML
BH CV ECHO MEAS - EF(MOD-BP): 63.4 %
BH CV ECHO MEAS - EF(MOD-SP2): 64.1 %
BH CV ECHO MEAS - EF(MOD-SP4): 63.9 %
BH CV ECHO MEAS - ESV(CUBED): 35.9 ML
BH CV ECHO MEAS - ESV(MOD-SP2): 20.3 ML
BH CV ECHO MEAS - ESV(MOD-SP4): 24.1 ML
BH CV ECHO MEAS - FS: 34 %
BH CV ECHO MEAS - IVS/LVPW: 1.25 CM
BH CV ECHO MEAS - IVSD: 1 CM
BH CV ECHO MEAS - LA DIMENSION: 4.4 CM
BH CV ECHO MEAS - LAT PEAK E' VEL: 13.1 CM/SEC
BH CV ECHO MEAS - LV DIASTOLIC VOL/BSA (35-75): 34.2 CM2
BH CV ECHO MEAS - LV MASS(C)D: 158.2 GRAMS
BH CV ECHO MEAS - LV MAX PG: 9.5 MMHG
BH CV ECHO MEAS - LV MEAN PG: 5 MMHG
BH CV ECHO MEAS - LV SYSTOLIC VOL/BSA (12-30): 12.3 CM2
BH CV ECHO MEAS - LV V1 MAX: 154 CM/SEC
BH CV ECHO MEAS - LV V1 VTI: 32.3 CM
BH CV ECHO MEAS - LVIDD: 5 CM
BH CV ECHO MEAS - LVIDS: 3.3 CM
BH CV ECHO MEAS - LVOT AREA: 2.8 CM2
BH CV ECHO MEAS - LVOT DIAM: 1.9 CM
BH CV ECHO MEAS - LVPWD: 0.8 CM
BH CV ECHO MEAS - MED PEAK E' VEL: 9.5 CM/SEC
BH CV ECHO MEAS - MV A MAX VEL: 111 CM/SEC
BH CV ECHO MEAS - MV DEC SLOPE: 474.5 CM/SEC2
BH CV ECHO MEAS - MV DEC TIME: 0.18 SEC
BH CV ECHO MEAS - MV E MAX VEL: 106 CM/SEC
BH CV ECHO MEAS - MV E/A: 0.95
BH CV ECHO MEAS - MV MAX PG: 5.7 MMHG
BH CV ECHO MEAS - MV MEAN PG: 3 MMHG
BH CV ECHO MEAS - MV P1/2T: 57.4 MSEC
BH CV ECHO MEAS - MV V2 VTI: 23.2 CM
BH CV ECHO MEAS - MVA(P1/2T): 3.8 CM2
BH CV ECHO MEAS - MVA(VTI): 3.9 CM2
BH CV ECHO MEAS - PA V2 MAX: 142 CM/SEC
BH CV ECHO MEAS - PULM A REVS DUR: 0.11 SEC
BH CV ECHO MEAS - PULM A REVS VEL: 30.7 CM/SEC
BH CV ECHO MEAS - PULM DIAS VEL: 40.2 CM/SEC
BH CV ECHO MEAS - PULM S/D: 1.62
BH CV ECHO MEAS - PULM SYS VEL: 65.2 CM/SEC
BH CV ECHO MEAS - RV MAX PG: 5.8 MMHG
BH CV ECHO MEAS - RV V1 MAX: 120 CM/SEC
BH CV ECHO MEAS - RV V1 VTI: 23.9 CM
BH CV ECHO MEAS - RVDD: 2.5 CM
BH CV ECHO MEAS - SI(MOD-SP2): 18.5 ML/M2
BH CV ECHO MEAS - SI(MOD-SP4): 21.8 ML/M2
BH CV ECHO MEAS - SV(LVOT): 91.6 ML
BH CV ECHO MEAS - SV(MOD-SP2): 36.2 ML
BH CV ECHO MEAS - SV(MOD-SP4): 42.7 ML
BH CV ECHO MEAS - TAPSE (>1.6): 2.19 CM
BH CV ECHO MEAS - TR MAX PG: 35.5 MMHG
BH CV ECHO MEAS - TR MAX VEL: 298 CM/SEC
BH CV ECHO MEASUREMENTS AVERAGE E/E' RATIO: 9.38
BH CV XLRA - RV BASE: 3.8 CM
BH CV XLRA - RV LENGTH: 6.5 CM
BH CV XLRA - RV MID: 2.9 CM
BH CV XLRA - TDI S': 12.5 CM/SEC
BUN SERPL-MCNC: 13 MG/DL (ref 8–23)
BUN/CREAT SERPL: 20.3 (ref 7–25)
CALCIUM SPEC-SCNC: 8.4 MG/DL (ref 8.6–10.5)
CHLORIDE SERPL-SCNC: 107 MMOL/L (ref 98–107)
CO2 SERPL-SCNC: 23 MMOL/L (ref 22–29)
CREAT SERPL-MCNC: 0.64 MG/DL (ref 0.57–1)
DEPRECATED RDW RBC AUTO: 43.1 FL (ref 37–54)
EGFRCR SERPLBLD CKD-EPI 2021: 98.8 ML/MIN/1.73
EOSINOPHIL # BLD AUTO: 0.23 10*3/MM3 (ref 0–0.4)
EOSINOPHIL NFR BLD AUTO: 4.4 % (ref 0.3–6.2)
ERYTHROCYTE [DISTWIDTH] IN BLOOD BY AUTOMATED COUNT: 13.2 % (ref 12.3–15.4)
GLUCOSE SERPL-MCNC: 85 MG/DL (ref 65–99)
HCT VFR BLD AUTO: 35.1 % (ref 34–46.6)
HGB BLD-MCNC: 10.7 G/DL (ref 12–15.9)
IMM GRANULOCYTES # BLD AUTO: 0.01 10*3/MM3 (ref 0–0.05)
IMM GRANULOCYTES NFR BLD AUTO: 0.2 % (ref 0–0.5)
LEFT ATRIUM VOLUME INDEX: 36.7 ML/M2
LYMPHOCYTES # BLD AUTO: 1.29 10*3/MM3 (ref 0.7–3.1)
LYMPHOCYTES NFR BLD AUTO: 24.7 % (ref 19.6–45.3)
MCH RBC QN AUTO: 26.8 PG (ref 26.6–33)
MCHC RBC AUTO-ENTMCNC: 30.5 G/DL (ref 31.5–35.7)
MCV RBC AUTO: 88 FL (ref 79–97)
MONOCYTES # BLD AUTO: 0.21 10*3/MM3 (ref 0.1–0.9)
MONOCYTES NFR BLD AUTO: 4 % (ref 5–12)
NEUTROPHILS NFR BLD AUTO: 3.45 10*3/MM3 (ref 1.7–7)
NEUTROPHILS NFR BLD AUTO: 66.1 % (ref 42.7–76)
NRBC BLD AUTO-RTO: 0 /100 WBC (ref 0–0.2)
PLATELET # BLD AUTO: 212 10*3/MM3 (ref 140–450)
PMV BLD AUTO: 10 FL (ref 6–12)
POTASSIUM SERPL-SCNC: 3.6 MMOL/L (ref 3.5–5.2)
RBC # BLD AUTO: 3.99 10*6/MM3 (ref 3.77–5.28)
SINUS: 2.8 CM
SODIUM SERPL-SCNC: 142 MMOL/L (ref 136–145)
WBC NRBC COR # BLD AUTO: 5.22 10*3/MM3 (ref 3.4–10.8)

## 2024-03-30 PROCEDURE — 99232 SBSQ HOSP IP/OBS MODERATE 35: CPT | Performed by: INTERNAL MEDICINE

## 2024-03-30 PROCEDURE — 85025 COMPLETE CBC W/AUTO DIFF WBC: CPT

## 2024-03-30 PROCEDURE — 93306 TTE W/DOPPLER COMPLETE: CPT

## 2024-03-30 PROCEDURE — 80048 BASIC METABOLIC PNL TOTAL CA: CPT

## 2024-03-30 PROCEDURE — 97161 PT EVAL LOW COMPLEX 20 MIN: CPT

## 2024-03-30 PROCEDURE — 25810000003 SODIUM CHLORIDE 0.9 % SOLUTION

## 2024-03-30 PROCEDURE — 93306 TTE W/DOPPLER COMPLETE: CPT | Performed by: INTERNAL MEDICINE

## 2024-03-30 RX ORDER — PANTOPRAZOLE SODIUM 40 MG/1
40 TABLET, DELAYED RELEASE ORAL 2 TIMES DAILY
Status: DISCONTINUED | OUTPATIENT
Start: 2024-03-30 | End: 2024-03-31 | Stop reason: HOSPADM

## 2024-03-30 RX ORDER — ROPINIROLE 0.25 MG/1
0.75 TABLET, FILM COATED ORAL EVERY 8 HOURS SCHEDULED
Status: DISCONTINUED | OUTPATIENT
Start: 2024-03-30 | End: 2024-03-31 | Stop reason: HOSPADM

## 2024-03-30 RX ORDER — PROPRANOLOL HYDROCHLORIDE 20 MG/1
20 TABLET ORAL 3 TIMES DAILY
Status: DISCONTINUED | OUTPATIENT
Start: 2024-03-30 | End: 2024-03-31 | Stop reason: HOSPADM

## 2024-03-30 RX ORDER — ATORVASTATIN CALCIUM 40 MG/1
40 TABLET, FILM COATED ORAL NIGHTLY
Status: DISCONTINUED | OUTPATIENT
Start: 2024-03-30 | End: 2024-03-31 | Stop reason: HOSPADM

## 2024-03-30 RX ORDER — HYDROCODONE BITARTRATE AND ACETAMINOPHEN 10; 325 MG/1; MG/1
1 TABLET ORAL EVERY 6 HOURS PRN
Status: DISCONTINUED | OUTPATIENT
Start: 2024-03-30 | End: 2024-03-31 | Stop reason: HOSPADM

## 2024-03-30 RX ORDER — LEVOTHYROXINE SODIUM 0.1 MG/1
100 TABLET ORAL
Status: DISCONTINUED | OUTPATIENT
Start: 2024-03-30 | End: 2024-03-31 | Stop reason: HOSPADM

## 2024-03-30 RX ORDER — CLOPIDOGREL BISULFATE 75 MG/1
75 TABLET ORAL DAILY
Status: DISCONTINUED | OUTPATIENT
Start: 2024-03-30 | End: 2024-03-31 | Stop reason: HOSPADM

## 2024-03-30 RX ORDER — SERTRALINE HYDROCHLORIDE 100 MG/1
100 TABLET, FILM COATED ORAL DAILY
Status: DISCONTINUED | OUTPATIENT
Start: 2024-03-30 | End: 2024-03-31 | Stop reason: HOSPADM

## 2024-03-30 RX ORDER — PHENAZOPYRIDINE HYDROCHLORIDE 200 MG/1
200 TABLET, FILM COATED ORAL 3 TIMES DAILY PRN
Status: DISCONTINUED | OUTPATIENT
Start: 2024-03-30 | End: 2024-03-31 | Stop reason: HOSPADM

## 2024-03-30 RX ADMIN — ACETAMINOPHEN 650 MG: 325 TABLET, FILM COATED ORAL at 05:52

## 2024-03-30 RX ADMIN — ATORVASTATIN CALCIUM 40 MG: 40 TABLET, FILM COATED ORAL at 22:30

## 2024-03-30 RX ADMIN — HYDROCODONE BITARTRATE AND ACETAMINOPHEN 1 TABLET: 10; 325 TABLET ORAL at 12:46

## 2024-03-30 RX ADMIN — ACETAMINOPHEN 650 MG: 325 TABLET, FILM COATED ORAL at 22:30

## 2024-03-30 RX ADMIN — SERTRALINE 100 MG: 100 TABLET, FILM COATED ORAL at 08:34

## 2024-03-30 RX ADMIN — HYDROCODONE BITARTRATE AND ACETAMINOPHEN 1 TABLET: 10; 325 TABLET ORAL at 18:51

## 2024-03-30 RX ADMIN — PROPRANOLOL HYDROCHLORIDE 20 MG: 20 TABLET ORAL at 08:34

## 2024-03-30 RX ADMIN — ROPINIROLE HYDROCHLORIDE 0.75 MG: 0.25 TABLET, FILM COATED ORAL at 05:52

## 2024-03-30 RX ADMIN — PANTOPRAZOLE SODIUM 40 MG: 40 TABLET, DELAYED RELEASE ORAL at 22:30

## 2024-03-30 RX ADMIN — ROPINIROLE HYDROCHLORIDE 0.75 MG: 0.25 TABLET, FILM COATED ORAL at 22:30

## 2024-03-30 RX ADMIN — LEVOTHYROXINE SODIUM 100 MCG: 0.1 TABLET ORAL at 05:52

## 2024-03-30 RX ADMIN — ROPINIROLE HYDROCHLORIDE 0.75 MG: 0.25 TABLET, FILM COATED ORAL at 14:03

## 2024-03-30 RX ADMIN — SODIUM CHLORIDE 100 ML/HR: 9 INJECTION, SOLUTION INTRAVENOUS at 10:59

## 2024-03-30 RX ADMIN — PANTOPRAZOLE SODIUM 40 MG: 40 TABLET, DELAYED RELEASE ORAL at 08:34

## 2024-03-30 RX ADMIN — Medication 10 ML: at 08:34

## 2024-03-30 RX ADMIN — CLOPIDOGREL BISULFATE 75 MG: 75 TABLET ORAL at 08:34

## 2024-03-30 RX ADMIN — PROPRANOLOL HYDROCHLORIDE 20 MG: 20 TABLET ORAL at 16:36

## 2024-03-30 NOTE — PROGRESS NOTES
Referring Provider: Anish Martinez MD    Reason for follow-up: Syncope     Patient Care Team:  Manisha Britton APRN as PCP - General (Nurse Practitioner)  Kilo Cook MD as Consulting Physician (Cardiology)  Shawn Cox Jr., MD (General Surgery)      SUBJECTIVE  Laying comfortably in bed and denies any chest pain or shortness of breath.     ROS  Review of all systems negative except as indicated.    Since I have last seen, the patient has been without any chest discomfort, shortness of breath, palpitations, dizziness or syncope.  Denies having any headache, abdominal pain, nausea, vomiting, diarrhea, constipation, loss of weight or loss of appetite.  Denies having any excessive bruising, hematuria or blood in the stool.  ROS      Personal History:    Past Medical History:   Diagnosis Date    Arthritis     Asthma     Brain TIA 2021    COVID 11/2020    Depression     Disease of thyroid gland     E. coli UTI     pt states colonized on poab    Heart murmur     History of anemia     History of small bowel obstruction     D/T ADHESIONS, HAD MULTIPLE SURGERIES (5)    History of transfusion     no reaction    History of ulcer disease     WITH GI BLEED AND TRANSFUSION    Hyperlipidemia     Hypertension     Kidney stone     Low back pain     Mitral valve prolapse     Morbid obesity with BMI of 40.0-44.9, adult 02/24/2020    Osteopenia     Osteoporosis     Renal insufficiency     Tachycardia     FAMILY MD MANAGES, PT DENIES AFIB/FLUTTER HX    Tinnitus     Tinnitus of both ears     Uses contact lenses     UTI (urinary tract infection) 02/22/2017    FINISHED ALL ABX, BACTRIM 2/28/17       Past Surgical History:   Procedure Laterality Date    APPENDECTOMY      BOTOX INJECTION      bladder to control incontinence    CARDIAC CATHETERIZATION N/A 12/15/2020    Procedure: Left Heart Cath;  Surgeon: Kilo Cook MD;  Location: [facility] CATH INVASIVE LOCATION;  Service: Cardiology;  Laterality: N/A;     CARDIAC CATHETERIZATION N/A 12/15/2020    Procedure: Left ventriculography;  Surgeon: Kilo Cook MD;  Location:  [facility] CATH INVASIVE LOCATION;  Service: Cardiology;  Laterality: N/A;    CARDIAC CATHETERIZATION N/A 12/15/2020    Procedure: Coronary angiography;  Surgeon: Kilo Cook MD;  Location:  [facility] CATH INVASIVE LOCATION;  Service: Cardiology;  Laterality: N/A;    CATARACT EXTRACTION EXTRACAPSULAR W/ INTRAOCULAR LENS IMPLANTATION Bilateral     CERVICAL FUSION      CHOLECYSTECTOMY      COLONOSCOPY      CYSTOSCOPY URETEROSCOPY STONE MANIPULATION/EXTRACTION      X3    CYSTOSCOPY W/ URETERAL STENT PLACEMENT Left 04/18/2018    Procedure: CYSTOSCOPY URETERAL STENT INSERTION;  Surgeon: Arnol Dewey MD;  Location: [facility] MAIN OR;  Service: Urology    ENDOSCOPY      HYSTERECTOMY      LYSIS OF ABDOMINAL ADHESIONS      MULTIPLE    ROTATOR CUFF REPAIR Left     ULNAR NERVE TRANSPOSITION Left     URETEROSCOPY LASER LITHOTRIPSY WITH STENT INSERTION Left 03/01/2017    Procedure: LT URETEROSCOPY LASER LITHOTRIPSY WITH STENT INSERTION AND STONE BASKET EXTRACTION BILATERAL RETROGRADE PYLOGRAM LASER LITHROTRIPSY;  Surgeon: Arnol Hernandez MD;  Location: [facility] MAIN OR;  Service:     URETEROSCOPY LASER LITHOTRIPSY WITH STENT INSERTION Right 6/7/2023    Procedure: RIGHT URETEROSCOPY CYSTOSCOPY RETOGRADE PYELOGRAM WITH STENT PLACEMENT;  Surgeon: Arnol Hernandez MD;  Location: [facility] MAIN OR;  Service: Urology;  Laterality: Right;       Family History   Problem Relation Age of Onset    Heart failure Mother     Heart disease Mother     Heart attack Mother     Hyperlipidemia Father     Heart failure Father     Heart disease Father     Hypertension Sister     Hyperlipidemia Sister     Hypertension Brother     Hyperlipidemia Brother     Hypertension Brother     Malig Hyperthermia Neg Hx        Social History     Tobacco Use    Smoking status: Never    Smokeless tobacco: Never   Vaping Use     Vaping status: Never Used   Substance Use Topics    Alcohol use: No    Drug use: Never        Home meds:  Prior to Admission medications    Medication Sig Start Date End Date Taking? Authorizing Provider   albuterol sulfate  (90 Base) MCG/ACT inhaler Inhale 2 puffs Every 4 (Four) Hours As Needed for Wheezing.   Yes Tatyana Turk MD   atorvastatin (LIPITOR) 40 MG tablet Take 1 tablet by mouth Every Night. 7pm 12/8/20  Yes Tatyana Turk MD   clopidogrel (PLAVIX) 75 MG tablet Take 1 tablet by mouth Daily.   Yes Tatyana Turk MD   HYDROcodone-acetaminophen (NORCO)  MG per tablet Take 1 tablet by mouth 4 (Four) Times a Day.   Yes Tatyana Turk MD   levothyroxine (SYNTHROID, LEVOTHROID) 100 MCG tablet Take 1 tablet by mouth Daily.   Yes Tatyana Turk MD   nitrofurantoin (MACRODANTIN) 50 MG capsule Take 1 capsule by mouth Every Night.   Yes Tatyana Turk MD   pantoprazole (PROTONIX) 40 MG EC tablet Take 1 tablet by mouth 2 (Two) Times a Day.   Yes Tatyana Turk MD   propranolol (INDERAL) 20 MG tablet Take 1 tablet by mouth 3 (Three) Times a Day. 10/19/20  Yes Tatyana Turk MD   rOPINIRole XL (REQUIP XL) 2 MG 24 hr tablet Take 1 tablet by mouth Every Night. 7 pm 2/14/20  Yes Tatyana Turk MD   sertraline (ZOLOFT) 100 MG tablet Take 1 tablet by mouth Daily.   Yes Tatyana Turk MD   cholecalciferol (VITAMIN D3) 25 MCG (1000 UT) tablet Take 1 tablet by mouth Daily.    Tatyana Turk MD   Omega-3 Fatty Acids (fish oil) 1000 MG capsule capsule Take 1 capsule by mouth Daily With Breakfast.    Tatyana Turk MD   phenazopyridine (Pyridium) 200 MG tablet Take 1 tablet by mouth 3 (Three) Times a Day As Needed for Bladder Spasms. 6/7/23   Arnol Hernandez MD       Allergies:  Benadryl [diphenhydramine], Codeine, Nsaids, Ambien [zolpidem], Carisoprodol, Cyclobenzaprine, Levaquin [levofloxacin], Metaxalone, Morphine and  "related, Other, Penicillins, Phenergan [promethazine hcl], Reglan [metoclopramide], and Ultram [tramadol]    Scheduled Meds:atorvastatin, 40 mg, Oral, Nightly  clopidogrel, 75 mg, Oral, Daily  levothyroxine, 100 mcg, Oral, Q AM  pantoprazole, 40 mg, Oral, BID  propranolol, 20 mg, Oral, TID  rOPINIRole, 0.75 mg, Oral, Q8H  sertraline, 100 mg, Oral, Daily      Continuous Infusions:sodium chloride, 100 mL/hr, Last Rate: 100 mL/hr (03/30/24 1059)      PRN Meds:.  acetaminophen **OR** acetaminophen **OR** acetaminophen    senna-docusate sodium **AND** polyethylene glycol **AND** bisacodyl **AND** bisacodyl    HYDROcodone-acetaminophen    melatonin    nitroglycerin    ondansetron    phenazopyridine    [COMPLETED] Insert Peripheral IV **AND** sodium chloride      OBJECTIVE    Vital Signs  Vitals:    03/29/24 1944 03/30/24 0833 03/30/24 1145 03/30/24 1333   BP: 110/49 120/54 135/81    BP Location: Right arm Right arm Right arm    Patient Position: Lying Lying Lying    Pulse: 85 98 84    Resp: 16 17 14    Temp: 98.2 °F (36.8 °C)      TempSrc: Oral      SpO2: 99% 98%     Weight:    90.7 kg (200 lb)   Height:    162.6 cm (64\")       Flowsheet Rows      Flowsheet Row First Filed Value   Admission Height 162.6 cm (64\") Documented at 03/29/2024 0058   Admission Weight 91 kg (200 lb 9.9 oz) Documented at 03/29/2024 0058              Intake/Output Summary (Last 24 hours) at 3/30/2024 1534  Last data filed at 3/30/2024 1453  Gross per 24 hour   Intake 1590 ml   Output 900 ml   Net 690 ml          Telemetry: Sinus rhythm    Physical Exam:  The patient is alert, oriented and in no distress.  Vital signs as noted above.  Head and neck revealed no carotid bruits or jugular venous distention.  No thyromegaly or lymphadenopathy is present  Lungs clear.  No wheezing.  Breath sounds are normal bilaterally.  Heart normal first and second heart sounds.  No murmur. No precordial rub is present.  No gallop is present.  Abdomen soft and " nontender.  No organomegaly is present.  Extremities with good peripheral pulses without any pedal edema.  Skin warm and dry.  Musculoskeletal system is grossly normal.  CNS grossly normal.       Results Review:  I have personally reviewed the results from the time of this admission to 3/30/2024 15:34 EDT and agree with these findings:  []  Laboratory  []  Microbiology  []  Radiology  []  EKG/Telemetry   []  Cardiology/Vascular   []  Pathology  []  Old records  []  Other:    Most notable findings include:    Lab Results (last 24 hours)       Procedure Component Value Units Date/Time    Basic Metabolic Panel [[id number]]  (Abnormal) Collected: 03/30/24 0259    Specimen: Blood Updated: 03/30/24 0439     Glucose 85 mg/dL      BUN 13 mg/dL      Creatinine 0.64 mg/dL      Sodium 142 mmol/L      Potassium 3.6 mmol/L      Chloride 107 mmol/L      CO2 23.0 mmol/L      Calcium 8.4 mg/dL      BUN/Creatinine Ratio 20.3     Anion Gap 12.0 mmol/L      eGFR 98.8 mL/min/1.73     Narrative:      GFR Normal >60  Chronic Kidney Disease <60  Kidney Failure <15      CBC & Differential [[id number]]  (Abnormal) Collected: 03/30/24 0259    Specimen: Blood Updated: 03/30/24 0350    Narrative:      The following orders were created for panel order CBC & Differential.  Procedure                               Abnormality         Status                     ---------                               -----------         ------                     CBC Auto Differential[[id number]]        Abnormal            Final result                 Please view results for these tests on the individual orders.    CBC Auto Differential [[id number]]  (Abnormal) Collected: 03/30/24 0259    Specimen: Blood Updated: 03/30/24 0350     WBC 5.22 10*3/mm3      RBC 3.99 10*6/mm3      Hemoglobin 10.7 g/dL      Hematocrit 35.1 %      MCV 88.0 fL      MCH 26.8 pg      MCHC 30.5 g/dL      RDW 13.2 %      RDW-SD 43.1 fl      MPV 10.0 fL      Platelets 212 10*3/mm3      Neutrophil %  66.1 %      Lymphocyte % 24.7 %      Monocyte % 4.0 %      Eosinophil % 4.4 %      Basophil % 0.6 %      Immature Grans % 0.2 %      Neutrophils, Absolute 3.45 10*3/mm3      Lymphocytes, Absolute 1.29 10*3/mm3      Monocytes, Absolute 0.21 10*3/mm3      Eosinophils, Absolute 0.23 10*3/mm3      Basophils, Absolute 0.03 10*3/mm3      Immature Grans, Absolute 0.01 10*3/mm3      nRBC 0.0 /100 WBC             Imaging Results (Last 24 Hours)       ** No results found for the last 24 hours. **            LAB RESULTS (LAST 7 DAYS)    CBC  Results from last 7 days   Lab Units 03/30/24  0259 03/29/24  0230   WBC 10*3/mm3 5.22 6.58   RBC 10*6/mm3 3.99 4.13   HEMOGLOBIN g/dL 10.7* 11.4*   HEMATOCRIT % 35.1 36.9   MCV fL 88.0 89.3   PLATELETS 10*3/mm3 212 243       BMP  Results from last 7 days   Lab Units 03/30/24  0259 03/29/24  0230   SODIUM mmol/L 142 139   POTASSIUM mmol/L 3.6 3.9   CHLORIDE mmol/L 107 105   CO2 mmol/L 23.0 23.0   BUN mg/dL 13 24*   CREATININE mg/dL 0.64 1.28*   GLUCOSE mg/dL 85 98       CMP   Results from last 7 days   Lab Units 03/30/24  0259 03/29/24  0230   SODIUM mmol/L 142 139   POTASSIUM mmol/L 3.6 3.9   CHLORIDE mmol/L 107 105   CO2 mmol/L 23.0 23.0   BUN mg/dL 13 24*   CREATININE mg/dL 0.64 1.28*   GLUCOSE mg/dL 85 98   ALBUMIN g/dL  --  4.5   BILIRUBIN mg/dL  --  0.3   ALK PHOS U/L  --  84   AST (SGOT) U/L  --  20   ALT (SGPT) U/L  --  19       BNP        TROPONIN  Results from last 7 days   Lab Units 03/29/24  0230   HSTROP T ng/L 10       CoAg        Creatinine Clearance  Estimated Creatinine Clearance: 96.9 mL/min (by C-G formula based on SCr of 0.64 mg/dL).    ABG        Radiology  CT Head Without Contrast    Result Date: 3/29/2024  Impression: No acute intracranial process Electronically Signed: Gagandeep Mccrary MD  3/29/2024 3:26 AM EDT  Workstation ID: [id number]    XR Chest 1 View    Result Date: 3/29/2024  Impression: No active disease Electronically Signed: Gagandeep Mccrary MD  3/29/2024 2:19 AM  [employer]  Workstation ID: [id number]       EKG  I personally viewed and interpreted the patient's EKG/Telemetry data:  ECG 12 Lead Syncope   Final Result   HEART RATE= 77  bpm   RR Interval= 780  ms   WI Interval= 167  ms   P Horizontal Axis= 12  deg   P Front Axis= 42  deg   QRSD Interval= 93  ms   QT Interval= 396  ms   QTcB= 448  ms   QRS Axis= -41  deg   T Wave Axis= 3  deg   - OTHERWISE NORMAL ECG -   Sinus rhythm   Low voltage, precordial leads   LAD   LVA   When compared with ECG of 04-Mar-2024 20:24:22,   Significant axis, voltage or hypertrophy change   Electronically Signed By: Zaki Yu (LILA) 29-Mar-2024 07:05:49   Date and Time of Study: 2024-03-29 01:32:49            Echocardiogram:    Results for orders placed during the hospital encounter of 03/29/24    Adult Transthoracic Echo Complete With Contrast if Necessary Per Protocol    Interpretation Summary    Left ventricular systolic function is normal. Calculated left ventricular EF = 63.4% Left ventricular ejection fraction appears to be 61 - 65%.    Left ventricular diastolic function is consistent with (grade I) impaired relaxation.    Left atrial volume is moderately increased.    Estimated right ventricular systolic pressure from tricuspid regurgitation is mildly elevated (35-45 mmHg).    No significant valvular abnormalities noted.        Stress Test:  Results for orders placed in visit on 02/24/20    Stress Test With Myocardial Perfusion One Day    Interpretation Summary  · Findings consistent with a normal ECG stress test.  · Left ventricular ejection fraction is normal (Calculated EF = 60%).  · Myocardial perfusion imaging indicates a normal myocardial perfusion study with no evidence of ischemia.  · Impressions are consistent with a low risk study.  · Very small inferior ischemia seen on polar view , does not appears to be clinically significant    Asymptomatic for chest pain. ECG is negative for ischemia.  Ectopy: none  B/P is appropriate for  Beta-blocker therapy.  Pharmacologic study due to inability to tolerate increasing speed and grade of treadmill due to c/o low back pain and Beta-blocker therapy.  Unable to participate in low level exercise due to c/o low back pain.  Supervised by:  Smiley LUNA         Cardiac Catheterization:  Results for orders placed during the hospital encounter of 12/15/20    Cardiac Catheterization/Vascular Study    Narrative  Table formatting from the original result was not included.    · Successful right and left coronary angiogram and LV gram  · Normal coronary arteries  · Normal LV gram    December 15, 2020    Cherelle ANDUJAR Jamie  1960  60 y.o.  female  [id number]    Procedures Performed    1. Left heart catheterization  2. Selective coronary angiography  3. Left ventriculography        :   Kilo Cook MD    Vascular Access Site: Right radial    Indication for procedure: Positive stress test    Referring Physician:      Pertinent History    Past Medical History:  Diagnosis Date   Arthritis   Heart murmur   History of anemia   History of small bowel obstruction  D/T ADHESIONS, HAD MULTIPLE SURGERIES (5)   History of transfusion   History of ulcer disease  WITH GI BLEED AND TRANSFUSION   Hyperlipidemia   Kidney stone   Low back pain   Mitral valve prolapse   Renal insufficiency   Tachycardia  FAMILY MD MANAGES, PT DENIES AFIB/FLUTTER HX   Tinnitus of both ears   Uses contact lenses   UTI (urinary tract infection) 02/22/2017  FINISHED ALL ABX, BACTRIM 2/28/17    Past Surgical History:  Procedure Laterality Date   APPENDECTOMY   CERVICAL FUSION   CHOLECYSTECTOMY   CYSTOSCOPY URETEROSCOPY STONE MANIPULATION/EXTRACTION  X3   CYSTOSCOPY W/ URETERAL STENT PLACEMENT Left 4/18/2018  Procedure: CYSTOSCOPY URETERAL STENT INSERTION;  Surgeon: Arnol Dewey MD;  Location: [facility];  Service: Urology   HYSTERECTOMY   LYSIS OF ABDOMINAL ADHESIONS  MULTIPLE   ROTATOR CUFF REPAIR    ULNAR NERVE TRANSPOSITION Left   URETEROSCOPY LASER LITHOTRIPSY WITH STENT INSERTION Left 3/1/2017  Procedure: LT URETEROSCOPY LASER LITHOTRIPSY WITH STENT INSERTION AND STONE BASKET EXTRACTION BILATERAL RETROGRADE PYLOGRAM LASER LITHROTRIPSY;  Surgeon: Arnol Hernandez MD;  Location: [facility];  Service:      Procedure Note    After discussing the risks, benefits, and alternatives of the procedure, informed consent was obtained.  The vascular access site was prepped and draped in the usual sterile fashion.  2% lidocaine was used for local anesthesia. Appropriate landmarks were assessed.  A 5 Portuguese short sheath was inserted in the artery using the modified Seldinger technique.    Selective coronary angiography was performed with JL4 and JR4 diagnostic catheters. Left ventriculogram  was performed with an angled pigtail catheter.  All exchanges were performed over the wire.  There were no apparent acute or early complications.  The patient tolerated the procedure well and was transferred to the recovery area in stable condition.    Artery hemostasis was achieved with manual pressure.        Contrast:    79  cc  Complications:  None  Blood Loss: minimal      Hemodynamics    Pressures    Ao:    120/80 mmHg  LV:    120/12 mmHg  End-diastolic pressure:  12 mmHg  No significant aortic valvular gradient on pullback    Coronary Angiography    Left Main:  The left main originates in the left coronary cusp.  It bifurcates and gives rise to the LAD and circumflex system.  Normal left main    Left Anterior Descending: Normal left anterior descending including the diagonal and  branches    Left Circumflex: Nondominant and normal    Right Coronary Artery:  The right coronary artery originates in the right coronary cusp.  Dominant and normal    Left Ventriculography:    Estimated Ejection Fraction: 60 %  Wall motion abnormalities:  None  Mitral Regurgitation:  None    Impression:    1. Normal coronary  arteries  2. Normal LV gram    Recommendations:    1. Medical management        I sincerely appreciate the opportunity to participate in your patient's care. Please feel free to contact me anytime if I can be of assistance in this or any other way.    Kilo Cook MD  12/15/2020  11:40 EST         Other:         ASSESSMENT & PLAN:    Principal Problem:    Syncope  Active Problems:    Essential hypertension    Obesity (BMI 30-39.9)    JACQUIE (acute kidney injury)    Syncope  CT head unremarkable  Vital signs normal and ECG plus troponins are unremarkable  Orthostatics negative for hypotension or tachycardia  Carotid ultrasound with no significant stenosis.  Echocardiogram with preserved LV function, grade 1 diastolic dysfunction  Will provide MCOT at the time of discharge if telemonitoring is unrevealing.  Vasovagal syncope high on the differential.  Meclizine as needed.     Acute kidney injury  Creatinine 1.28 compared to 0.7 at baseline  Creatinine is now normal 0.64 with hydration  Dehydration may have contributed to syncope as well     Hypertension  Blood pressure is currently well-controlled.     Hyperlipidemia  LDL 73, HDL 46, triglyceride 231 and total cholesterol 157  Resume high intensity statin     Obesity  BMI 34.4  Lifestyle modifications recommended to the patient  Screening and treatment for sleep apnea recommended.    Okay to discharge from cardiac standpoint.      Toribio Mata MD  03/30/24  15:34 EDT

## 2024-03-30 NOTE — PLAN OF CARE
Problem: Adult Inpatient Plan of Care  Goal: Plan of Care Review  Recent Flowsheet Documentation  Taken 3/30/2024 1041 by Sharona Bazzi PT  Progress: no change  Plan of Care Reviewed With: patient   Goal Outcome Evaluation:  Plan of Care Reviewed With: patient      Pt is a 62 y/o F w/ hx of hypertension, hyperlipidemia, obesity who presented to the hospital with complaints of syncope. Patient has been feeling unwell for the last few days and also reports dizziness and lightheadedness with an unwitnessed episode of losing consciousness at least 3 times. In the ER, EKG revealed normal sinus rhythm, troponin was negative. Creatinine was elevated at 1.3. Vital signs were essentially unremarkable with normal orthostatics. At baseline, pt lives w/ brother in a H w/ 0 ACE and was independent w/ all mobility/ADLs w/ no AD, still drives, denies falls. During eval, pt demonstrates independence with bed mobility/transfers/gait training 60 ft w/ no AD and has been up ad-quinn in room. Vitals stable throughout and with impaired L shoulder ROM but no other noteable deficits. Anticipate routine d/c home w/ no ongoing skilled PT needs. Eval only. PPE: gloves      Progress: no change         Anticipated Discharge Disposition (PT): home

## 2024-03-30 NOTE — THERAPY EVALUATION
Patient Name: Cherelle Ayala  : 1960    MRN: [id number]                              Today's Date: 3/30/2024       Admit Date: 3/29/2024    Visit Dx:     ICD-10-CM ICD-9-CM   1. Syncope, unspecified syncope type  R55 780.2     Patient Active Problem List   Diagnosis    Ureteral calculus, left    Nephrolithiasis    Precordial pain    Palpitations    Essential hypertension    Obesity (BMI 30-39.9)    Hydronephrosis    Syncope    JACQUIE (acute kidney injury)     Past Medical History:   Diagnosis Date    Arthritis     Asthma     Brain TIA     COVID 2020    Depression     Disease of thyroid gland     E. coli UTI     pt states colonized on poab    Heart murmur     History of anemia     History of small bowel obstruction     D/T ADHESIONS, HAD MULTIPLE SURGERIES (5)    History of transfusion     no reaction    History of ulcer disease     WITH GI BLEED AND TRANSFUSION    Hyperlipidemia     Hypertension     Kidney stone     Low back pain     Mitral valve prolapse     Morbid obesity with BMI of 40.0-44.9, adult 2020    Osteopenia     Osteoporosis     Renal insufficiency     Tachycardia     FAMILY MD MANAGES, PT DENIES AFIB/FLUTTER HX    Tinnitus     Tinnitus of both ears     Uses contact lenses     UTI (urinary tract infection) 2017    FINISHED ALL ABX, BACTRIM 17     Past Surgical History:   Procedure Laterality Date    APPENDECTOMY      BOTOX INJECTION      bladder to control incontinence    CARDIAC CATHETERIZATION N/A 12/15/2020    Procedure: Left Heart Cath;  Surgeon: Kilo Cook MD;  Location:  [facility] CATH INVASIVE LOCATION;  Service: Cardiology;  Laterality: N/A;    CARDIAC CATHETERIZATION N/A 12/15/2020    Procedure: Left ventriculography;  Surgeon: Kilo Cook MD;  Location:  [facility] CATH [facility]VASIVE [facility]ATION;  Service: Cardiology;  Laterality: N/A;    CARDIAC CATHETERIZATION N/A 12/15/2020    Procedure: Coronary angiography;  Surgeon: Kilo Cook MD;   Location: [facility] CATH INVASIVE LOCATION;  Service: Cardiology;  Laterality: N/A;    CATARACT EXTRACTION EXTRACAPSULAR W/ INTRAOCULAR LENS IMPLANTATION Bilateral     CERVICAL FUSION      CHOLECYSTECTOMY      COLONOSCOPY      CYSTOSCOPY URETEROSCOPY STONE MANIPULATION/EXTRACTION      X3    CYSTOSCOPY W/ URETERAL STENT PLACEMENT Left 04/18/2018    Procedure: CYSTOSCOPY URETERAL STENT INSERTION;  Surgeon: Arnol Dewey MD;  Location: [facility] MAIN OR;  Service: Urology    ENDOSCOPY      HYSTERECTOMY      LYSIS OF ABDOMINAL ADHESIONS      MULTIPLE    ROTATOR CUFF REPAIR Left     ULNAR NERVE TRANSPOSITION Left     URETEROSCOPY LASER LITHOTRIPSY WITH STENT INSERTION Left 03/01/2017    Procedure: LT URETEROSCOPY LASER LITHOTRIPSY WITH STENT INSERTION AND STONE BASKET EXTRACTION BILATERAL RETROGRADE PYLOGRAM LASER LITHROTRIPSY;  Surgeon: Arnol Hernandez MD;  Location: [facility] MAIN OR;  Service:     URETEROSCOPY LASER LITHOTRIPSY WITH STENT INSERTION Right 6/7/2023    Procedure: RIGHT URETEROSCOPY CYSTOSCOPY RETOGRADE PYELOGRAM WITH STENT PLACEMENT;  Surgeon: Arnol Hernandez MD;  Location: [facility] OR;  Service: Urology;  Laterality: Right;      General Information       Row Name 03/30/24 1041          Physical Therapy Time and Intention    Document Type evaluation  -AO     Mode of Treatment physical therapy  -AO       Row Name 03/30/24 1041          General Information    Patient Profile Reviewed yes  -AO     Prior Level of Function --  Independent w/ all mobility/ADLs w/ no AD, denies falls, still drives  -AO     Existing Precautions/Restrictions no known precautions/restrictions  -AO     Barriers to Rehab none identified  -AO       Row Name 03/30/24 1041          Living Environment    People in Home sibling(s)  Brother  -AO       Row Name 03/30/24 1041          Home Main Entrance    Number of Stairs, Main Entrance none  -AO       Row Name 03/30/24 1041          Stairs Within Home, Primary     Number of Stairs, Within Home, Primary none  -AO       Row Name 03/30/24 1041          Cognition    Orientation Status (Cognition) oriented x 4  -AO       Row Name 03/30/24 1041          Safety Issues, Functional Mobility    Impairments Affecting Function (Mobility) balance;sensation/sensory awareness;range of motion (ROM)  -AO               User Key  (r) = Recorded By, (t) = Taken By, (c) = Cosigned By      Initials Name Provider Type    AO Sharona Bazzi, PT Physical Therapist                   Mobility       Row Name 03/30/24 1041          Bed Mobility    Bed Mobility bed mobility (all) activities  -AO     All Activities, Towner (Bed Mobility) independent  -AO       Row Name 03/30/24 1041          Bed-Chair Transfer    Bed-Chair Towner (Transfers) independent  -AO       Row Name 03/30/24 1041          Sit-Stand Transfer    Sit-Stand Towner (Transfers) independent  -AO       Row Name 03/30/24 1041          Gait/Stairs (Locomotion)    Towner Level (Gait) independent  -AO     Assistive Device (Gait) --  no AD  -AO     Distance in Feet (Gait) 60  -AO     Deviations/Abnormal Patterns (Gait) britni decreased;antalgic  -AO               User Key  (r) = Recorded By, (t) = Taken By, (c) = Cosigned By      Initials Name Provider Type    AO Sharona Bazzi, PT Physical Therapist                   Obj/Interventions       Row Name 03/30/24 1041          Range of Motion Comprehensive    Comment, General Range of Motion LUE AROM elevation impaired ~50%, all else WFL  -AO       Row Name 03/30/24 1041          Strength Comprehensive (MMT)    General Manual Muscle Testing (MMT) Assessment no strength deficits identified  -AO       Row Name 03/30/24 1041          Balance    Balance Assessment sitting static balance;sitting dynamic balance;sit to stand dynamic balance;standing static balance;standing dynamic balance  -AO     Static Sitting Balance independent  -AO     Dynamic Sitting Balance independent   "-AO     Position, Sitting Balance unsupported;sitting edge of bed  -AO     Sit to Stand Dynamic Balance independent  -AO     Static Standing Balance independent  -AO     Dynamic Standing Balance independent  -AO     Position/Device Used, Standing Balance unsupported  -AO       Row Name 03/30/24 1041          Sensory Assessment (Somatosensory)    Sensory Assessment (Somatosensory) other (see comments)  Pt reports \"total body numbness\" that comes and goes when her K+ is low  -AO               User Key  (r) = Recorded By, (t) = Taken By, (c) = Cosigned By      Initials Name Provider Type    AO Sharona Bazzi, PT Physical Therapist                   Goals/Plan    No documentation.                  Clinical Impression       Row Name 03/30/24 1041          Pain    Pretreatment Pain Rating 0/10 - no pain  -AO     Posttreatment Pain Rating 0/10 - no pain  -AO     Pre/Posttreatment Pain Comment chronic LBP and hip/knee pain but does not complain this session  -AO       Row Name 03/30/24 1041          Plan of Care Review    Plan of Care Reviewed With patient  -AO     Progress no change  -AO       Row Name 03/30/24 1041          Therapy Assessment/Plan (PT)    Patient/Family Therapy Goals Statement (PT) Pt is a 64 y/o F w/ hx of hypertension, hyperlipidemia, obesity who presented to the hospital with complaints of syncope.  Patient has been feeling unwell for the last few days and also reports dizziness and lightheadedness with an unwitnessed episode of losing consciousness at least 3 times. In the ER, EKG revealed normal sinus rhythm, troponin was negative.  Creatinine was elevated at 1.3.  Vital signs were essentially unremarkable with normal orthostatics. At baseline, pt  lives w/ brother in a [facility] w/ 0 ACE and was independent w/ all mobility/ADLs w/ no AD, still drives, denies falls. During eval, pt demonstrates independence with bed mobility/transfers/gait training 60 ft w/ no AD and has been up ad-quinn in room. Vitals " stable throughout and with impaired L shoulder ROM but no other noteable deficits. Anticipate routine d/c home w/ no ongoing skilled PT needs. Eval only. PPE: gloves  -AO     Criteria for Skilled Interventions Met (PT) no;no problems identified which require skilled intervention  -AO     Therapy Frequency (PT) evaluation only  -AO       Row Name 03/30/24 1041          Vital Signs    Recovery Time vitals stable and WNL on RA  -AO       Row Name 03/30/24 1041          Positioning and Restraints    Pre-Treatment Position in bed  -AO     Post Treatment Position bed  -AO     In Bed supine;call light within reach  -AO               User Key  (r) = Recorded By, (t) = Taken By, (c) = Cosigned By      Initials Name Provider Type    AO Sharona Bazzi, PT Physical Therapist                   Outcome Measures    No documentation.                                Physical Therapy Education       Title: PT OT SLP Therapies (Done)       Topic: Physical Therapy (Done)       Point: Mobility training (Done)       Learning Progress Summary             Patient Acceptance, E,TB, VU by AO at 3/30/2024 1059                                         User Key       Initials Effective Dates Name Provider Type Discipline    AO 06/16/21 -  Sharona Bazzi, PT Physical Therapist PT                  PT Recommendation and Plan     Plan of Care Reviewed With: patient  Progress: no change     Time Calculation:   PT Evaluation Complexity  History, PT Evaluation Complexity: 1-2 personal factors and/or comorbidities  Examination of Body Systems (PT Eval Complexity): 1-2 elements  Clinical Presentation (PT Evaluation Complexity): stable  Clinical Decision Making (PT Evaluation Complexity): low complexity  Overall Complexity (PT Evaluation Complexity): low complexity     PT Charges       Row Name 03/30/24 1101             Time Calculation    Start Time 1041  -AO      Stop Time 1101  -AO      Time Calculation (min) 20 min  -AO      PT Received On 03/30/24   -AO         Time Calculation- PT    Total Timed Code Minutes- PT 0 minute(s)  -AO                User Key  (r) = Recorded By, (t) = Taken By, (c) = Cosigned By      Initials Name Provider Type    AO Sharona Bazzi, PT Physical Therapist                  Therapy Charges for Today       Code Description Service Date Service Provider Modifiers Qty    [id number]  PT EVAL LOW COMPLEXITY 4 3/30/2024 Sharona Bazzi, PT GP 1            PT G-Codes  AM-PAC 6 Clicks Score (PT): 20  PT Discharge Summary  Anticipated Discharge Disposition (PT): home    Sharona Bazzi, ALFIE  3/30/2024

## 2024-03-30 NOTE — PROGRESS NOTES
"[facility] MEDICINE SERVICE  DAILY PROGRESS NOTE    NAME: Cherelle Ayala  : 1960  MRN: [id number]      LOS: 1 day     PROVIDER OF SERVICE: Anish Martinez MD    Chief Complaint: Syncope    Subjective:     Interval History:  History taken from: patient chart    H&P note  Cherelle Ayala is a 63 y.o. female with a previous medical history of CAD, HTN, and Hyperlipidemia who presented to [facility] on 3/29/2024 with complaints of multiple episodes of syncope today, some of which occurred in the shower, she reports hitting her head.  She also has felt \"unwell\" for the past 2-3 days and dizziness.     In the ED, troponin was 10, creatinine 1.28 (baseline 0.72), otherwise, labs are unremarkable.  EKG shows SR, HR 77. CT of the head showed no acute abnormality.  Orthostatic BP was normal. She is afebrile, pulse is also stable. Hospitalist was consulted for further management of her syncopal episodes.    The patient was seen and examined today at bedside.  The patient denied any complaints this morning.  I discussed the plan with the patient and she is agreeable.    Review of Systems:   Pertinent items are noted in HPI, all other systems reviewed and negative     Objective:     Vital Signs  Temp:  [97.7 °F (36.5 °C)-98.5 °F (36.9 °C)] 98.2 °F (36.8 °C)  Heart Rate:  [77-98] 98  Resp:  [12-17] 17  BP: (105-125)/(48-54) 120/54  Flow (L/min):  [2] 2   Body mass index is 34.44 kg/m².    Physical Exam  General:          Alert, cooperative, no distress, appears stated age  Head:              Normocephalic, atraumatic, mucous membranes moist   Lungs:            Diminished breath sounds bilaterally  Heart::             Regular rate and rhythm, S1 and S2 normal,   Abdomen:Soft, nontender, nondistended, bowel sounds active  Skin:                No rashes or lesions  Neuro/psych:A&O x3. CN II through XII are grossly intact with appropriate affect    Scheduled Meds   atorvastatin, 40 mg, Oral, " Nightly  clopidogrel, 75 mg, Oral, Daily  levothyroxine, 100 mcg, Oral, Q AM  pantoprazole, 40 mg, Oral, BID  propranolol, 20 mg, Oral, TID  rOPINIRole, 0.75 mg, Oral, Q8H  sertraline, 100 mg, Oral, Daily       PRN Meds     acetaminophen **OR** acetaminophen **OR** acetaminophen    senna-docusate sodium **AND** polyethylene glycol **AND** bisacodyl **AND** bisacodyl    HYDROcodone-acetaminophen    melatonin    nitroglycerin    ondansetron    phenazopyridine    [COMPLETED] Insert Peripheral IV **AND** sodium chloride   Infusions  sodium chloride, 100 mL/hr, Last Rate: 100 mL/hr (03/30/24 1059)          Diagnostic Data    Results from last 7 days   Lab Units 03/30/24  0259 03/29/24  0230   WBC 10*3/mm3 5.22 6.58   HEMOGLOBIN g/dL 10.7* 11.4*   HEMATOCRIT % 35.1 36.9   PLATELETS 10*3/mm3 212 243   GLUCOSE mg/dL 85 98   CREATININE mg/dL 0.64 1.28*   BUN mg/dL 13 24*   SODIUM mmol/L 142 139   POTASSIUM mmol/L 3.6 3.9   AST (SGOT) U/L  --  20   ALT (SGPT) U/L  --  19   ALK PHOS U/L  --  84   BILIRUBIN mg/dL  --  0.3   ANION GAP mmol/L 12.0 11.0       CT Head Without Contrast    Result Date: 3/29/2024  Impression: No acute intracranial process Electronically Signed: Gagandeep Mccrary MD  3/29/2024 3:26 AM EDT  Workstation ID: [id number]    XR Chest 1 View    Result Date: 3/29/2024  Impression: No active disease Electronically Signed: Gagandeep Mccrary MD  3/29/2024 2:19 AM EDT  Workstation ID: [id number]       I reviewed the patient's new clinical results.    Assessment/Plan:     Active and Resolved Problems  Active Hospital Problems    Diagnosis  POA    **Syncope [R55]  Yes    Obesity (BMI 30-39.9) [E66.9]  Yes    JACQUIE (acute kidney injury) [N17.9]  Yes    Essential hypertension [I10]  Yes      Resolved Hospital Problems   No resolved problems to display.         Syncope  -CT of the head reviewed, no acute abnormality  -Orthostatic BP normal  -EKG reviewed, SR, HR 77  -Lipid panel ordered  -IV fluids ordered   -meclizine as  needed  -Continuous cardiac monitoring  -Fall precautions  -Carotid duplex is negative  -Cardiology consult: Echo echo is pending and [facility] at discharge     JACQUIE  Resolved    Hypertension  Controlled    Hyperlipidemia  Statin    Obesity  BMI 34.44  Diet and exercise  Outpatient sleep study    DVT prophylaxis:  Mechanical DVT prophylaxis orders are present.         Code status is   Code Status and Medical Interventions:   Ordered at: 03/29/24 0451     Code Status (Patient has no pulse and is not breathing):    CPR (Attempt to Resuscitate)     Medical Interventions (Patient has pulse or is breathing):    Full Support       Plan for disposition: Pending echo  Copied text in this portion of the note has been reviewed and is accurate as of 03/30/2024    Time: 30 minutes    Signature: Electronically signed by Anish Martinez MD, 03/30/24, 11:37 EDT.  [facility] Hospitalist Team

## 2024-03-31 ENCOUNTER — APPOINTMENT (OUTPATIENT)
Dept: RESPIRATORY THERAPY | Facility: HOSPITAL | Age: 64
DRG: 684 | End: 2024-03-31
Payer: MEDICARE

## 2024-03-31 VITALS
OXYGEN SATURATION: 98 % | SYSTOLIC BLOOD PRESSURE: 145 MMHG | RESPIRATION RATE: 16 BRPM | WEIGHT: 200 LBS | HEIGHT: 64 IN | DIASTOLIC BLOOD PRESSURE: 72 MMHG | BODY MASS INDEX: 34.15 KG/M2 | HEART RATE: 68 BPM | TEMPERATURE: 98.4 F

## 2024-03-31 LAB
ANION GAP SERPL CALCULATED.3IONS-SCNC: 12 MMOL/L (ref 5–15)
BASOPHILS # BLD AUTO: 0.03 10*3/MM3 (ref 0–0.2)
BASOPHILS NFR BLD AUTO: 0.5 % (ref 0–1.5)
BUN SERPL-MCNC: 7 MG/DL (ref 8–23)
BUN/CREAT SERPL: 10.8 (ref 7–25)
CALCIUM SPEC-SCNC: 8.3 MG/DL (ref 8.6–10.5)
CHLORIDE SERPL-SCNC: 111 MMOL/L (ref 98–107)
CO2 SERPL-SCNC: 22 MMOL/L (ref 22–29)
CREAT SERPL-MCNC: 0.65 MG/DL (ref 0.57–1)
DEPRECATED RDW RBC AUTO: 44.7 FL (ref 37–54)
EGFRCR SERPLBLD CKD-EPI 2021: 98.5 ML/MIN/1.73
EOSINOPHIL # BLD AUTO: 0.21 10*3/MM3 (ref 0–0.4)
EOSINOPHIL NFR BLD AUTO: 3.5 % (ref 0.3–6.2)
ERYTHROCYTE [DISTWIDTH] IN BLOOD BY AUTOMATED COUNT: 13.7 % (ref 12.3–15.4)
GLUCOSE SERPL-MCNC: 99 MG/DL (ref 65–99)
HCT VFR BLD AUTO: 34.3 % (ref 34–46.6)
HGB BLD-MCNC: 10.5 G/DL (ref 12–15.9)
IMM GRANULOCYTES # BLD AUTO: 0.03 10*3/MM3 (ref 0–0.05)
IMM GRANULOCYTES NFR BLD AUTO: 0.5 % (ref 0–0.5)
LYMPHOCYTES # BLD AUTO: 1.95 10*3/MM3 (ref 0.7–3.1)
LYMPHOCYTES NFR BLD AUTO: 32.2 % (ref 19.6–45.3)
MCH RBC QN AUTO: 27.1 PG (ref 26.6–33)
MCHC RBC AUTO-ENTMCNC: 30.6 G/DL (ref 31.5–35.7)
MCV RBC AUTO: 88.6 FL (ref 79–97)
MONOCYTES # BLD AUTO: 0.26 10*3/MM3 (ref 0.1–0.9)
MONOCYTES NFR BLD AUTO: 4.3 % (ref 5–12)
NEUTROPHILS NFR BLD AUTO: 3.57 10*3/MM3 (ref 1.7–7)
NEUTROPHILS NFR BLD AUTO: 59 % (ref 42.7–76)
NRBC BLD AUTO-RTO: 0 /100 WBC (ref 0–0.2)
PLATELET # BLD AUTO: 210 10*3/MM3 (ref 140–450)
PMV BLD AUTO: 9.9 FL (ref 6–12)
POTASSIUM SERPL-SCNC: 3.3 MMOL/L (ref 3.5–5.2)
RBC # BLD AUTO: 3.87 10*6/MM3 (ref 3.77–5.28)
SODIUM SERPL-SCNC: 145 MMOL/L (ref 136–145)
WBC NRBC COR # BLD AUTO: 6.05 10*3/MM3 (ref 3.4–10.8)

## 2024-03-31 PROCEDURE — 94618 PULMONARY STRESS TESTING: CPT

## 2024-03-31 PROCEDURE — 85025 COMPLETE CBC W/AUTO DIFF WBC: CPT

## 2024-03-31 PROCEDURE — 93246 EXT ECG>7D<15D RECORDING: CPT

## 2024-03-31 PROCEDURE — 80048 BASIC METABOLIC PNL TOTAL CA: CPT

## 2024-03-31 PROCEDURE — 99232 SBSQ HOSP IP/OBS MODERATE 35: CPT | Performed by: INTERNAL MEDICINE

## 2024-03-31 RX ORDER — POTASSIUM CHLORIDE 20 MEQ/1
40 TABLET, EXTENDED RELEASE ORAL ONCE
Status: DISCONTINUED | OUTPATIENT
Start: 2024-03-31 | End: 2024-03-31 | Stop reason: HOSPADM

## 2024-03-31 RX ORDER — POTASSIUM CHLORIDE 20 MEQ/1
40 TABLET, EXTENDED RELEASE ORAL EVERY 4 HOURS
Status: COMPLETED | OUTPATIENT
Start: 2024-03-31 | End: 2024-03-31

## 2024-03-31 RX ADMIN — LEVOTHYROXINE SODIUM 100 MCG: 0.1 TABLET ORAL at 05:15

## 2024-03-31 RX ADMIN — SERTRALINE 100 MG: 100 TABLET, FILM COATED ORAL at 07:28

## 2024-03-31 RX ADMIN — PANTOPRAZOLE SODIUM 40 MG: 40 TABLET, DELAYED RELEASE ORAL at 07:29

## 2024-03-31 RX ADMIN — PROPRANOLOL HYDROCHLORIDE 20 MG: 20 TABLET ORAL at 07:29

## 2024-03-31 RX ADMIN — ROPINIROLE HYDROCHLORIDE 0.75 MG: 0.25 TABLET, FILM COATED ORAL at 05:15

## 2024-03-31 RX ADMIN — POTASSIUM CHLORIDE 40 MEQ: 1500 TABLET, EXTENDED RELEASE ORAL at 08:50

## 2024-03-31 RX ADMIN — HYDROCODONE BITARTRATE AND ACETAMINOPHEN 1 TABLET: 10; 325 TABLET ORAL at 07:28

## 2024-03-31 RX ADMIN — HYDROCODONE BITARTRATE AND ACETAMINOPHEN 1 TABLET: 10; 325 TABLET ORAL at 12:37

## 2024-03-31 RX ADMIN — Medication 10 ML: at 07:29

## 2024-03-31 RX ADMIN — HYDROCODONE BITARTRATE AND ACETAMINOPHEN 1 TABLET: 10; 325 TABLET ORAL at 00:40

## 2024-03-31 RX ADMIN — POTASSIUM CHLORIDE 40 MEQ: 1500 TABLET, EXTENDED RELEASE ORAL at 12:28

## 2024-03-31 RX ADMIN — CLOPIDOGREL BISULFATE 75 MG: 75 TABLET ORAL at 07:29

## 2024-03-31 NOTE — PROGRESS NOTES
Exercise Oximetry    Patient Name:Cherelle Ayala   MRN: [id number]   Date: 03/31/24             ROOM AIR BASELINE   SpO2% 97   Heart Rate 86   Blood Pressure      EXERCISE ON ROOM AIR SpO2% EXERCISE ON O2 @  LPM SpO2%   1 MINUTE 96 1 MINUTE    2 MINUTES 97 2 MINUTES    3 MINUTES 95 3 MINUTES    4 MINUTES 96 4 MINUTES    5 MINUTES 97 5 MINUTES    6 MINUTES 97 6 MINUTES               Distance Walked   Distance Walked   Dyspnea (Priyanka Scale)   Dyspnea (Priyanka Scale)   Fatigue (Priyanka Scale)   Fatigue (Priyanka Scale)   SpO2% Post Exercise  97 SpO2% Post Exercise   HR Post Exercise  86 HR Post Exercise   Time to Recovery   Time to Recovery     Comments: Patient requires no home O2. SATS stayed 55-97% for 6 minute duration.

## 2024-03-31 NOTE — DISCHARGE SUMMARY
"[facility] Medicine Services  Discharge Summary    Date of Service: 2024  Patient Name: Cherelle Ayala  : 1960  MRN: [id number]    Date of Admission: 3/29/2024  Discharge Diagnosis: Syncope  Date of Discharge: 2024  Primary Care Physician: Manisha Britton APRN      Presenting Problem:   Syncope [R55]  Syncope, unspecified syncope type [R55]    Active and Resolved Hospital Problems:  Active Hospital Problems    Diagnosis POA    **Syncope [R55] Yes    Obesity (BMI 30-39.9) [E66.9] Yes    JACQUIE (acute kidney injury) [N17.9] Yes    Essential hypertension [I10] Yes      Resolved Hospital Problems   No resolved problems to display.         Hospital Course     HPI:  Per the H&P written by Suha Avila DO  , dated 2024:  \"Cherelle Ayala is a 63 y.o. female with a previous medical history of CAD, HTN, and Hyperlipidemia who presented to [facility] on 3/29/2024 with complaints of multiple episodes of syncope today, some of which occurred in the shower, she reports hitting her head.  She also has felt \"unwell\" for the past 2-3 days and dizziness.     In the ED, troponin was 10, creatinine 1.28 (baseline 0.72), otherwise, labs are unremarkable.  EKG shows SR, HR 77. CT of the head showed no acute abnormality.  Orthostatic BP was normal. She is afebrile, pulse is also stable. Hospitalist was consulted for further management of her syncopal episodes.\"    Hospital Course:  The patient was admitted to the hospital for workup of syncope.  Orthostatic was normal.  CT head with no acute abnormality.  Carotid duplex was negative, echo was grade 1 left ventricular diastolic dysfunction.  The patient will be having MCOT at discharge.        DISCHARGE Follow Up Recommendations for labs and diagnostics: Follow-up with PCP and cardiology      Reasons For Change In Medications and Indications for New Medications:      Day of Discharge     Vital Signs:  Temp:  [98.2 °F (36.8 " °C)-98.4 °F (36.9 °C)] 98.4 °F (36.9 °C)  Heart Rate:  [76-98] 76  Resp:  [14-21] 19  BP: (105-135)/(47-81) 116/53    Physical Exam:  Physical Exam   General:          Alert, cooperative, no distress, appears stated age  Head:              Normocephalic, atraumatic, mucous membranes moist   Lungs:            Diminished breath sounds bilaterally  Heart::             Regular rate and rhythm, S1 and S2 normal,   Abdomen:Soft, nontender, nondistended, bowel sounds active  Skin:                No rashes or lesions  Neuro/psych:A&O x3. with appropriate affect       Pertinent  and/or Most Recent Results     LAB RESULTS:      Lab 03/31/24  0515 03/30/24  0259 03/29/24  0230   WBC 6.05 5.22 6.58   HEMOGLOBIN 10.5* 10.7* 11.4*   HEMATOCRIT 34.3 35.1 36.9   PLATELETS 210 212 243   NEUTROS ABS 3.57 3.45 2.95   IMMATURE GRANS (ABS) 0.03 0.01 0.01   LYMPHS ABS 1.95 1.29 2.58   MONOS ABS 0.26 0.21 0.44   EOS ABS 0.21 0.23 0.52*   MCV 88.6 88.0 89.3         Lab 03/31/24  0515 03/30/24  0259 03/29/24  0230   SODIUM 145 142 139   POTASSIUM 3.3* 3.6 3.9   CHLORIDE 111* 107 105   CO2 22.0 23.0 23.0   ANION GAP 12.0 12.0 11.0   BUN 7* 13 24*   CREATININE 0.65 0.64 1.28*   EGFR 98.5 98.8 46.9*   GLUCOSE 99 85 98   CALCIUM 8.3* 8.4* 9.2         Lab 03/29/24  0230   TOTAL PROTEIN 6.7   ALBUMIN 4.5   GLOBULIN 2.2   ALT (SGPT) 19   AST (SGOT) 20   BILIRUBIN 0.3   ALK PHOS 84         Lab 03/29/24  0230   HSTROP T 10         Lab 03/29/24  0230   CHOLESTEROL 157   LDL CHOL 73   HDL CHOL 46   TRIGLYCERIDES 231*             Brief Urine Lab Results  (Last result in the past 365 days)        Color   Clarity   Blood   Leuk Est   Nitrite   Protein   CREAT   Urine HCG        03/29/24 0415 Yellow   Clear   Negative   Negative   Negative   Negative                 Microbiology Results (last 10 days)       ** No results found for the last 240 hours. **            CT Head Without Contrast    Result Date: 3/29/2024  Impression: Impression: No acute  intracranial process Electronically Signed: Gagandeep Mccrary MD  3/29/2024 3:26 AM EDT  Workstation ID: [id number]    XR Chest 1 View    Result Date: 3/29/2024  Impression: Impression: No active disease Electronically Signed: Gagandeep Mccrary MD  3/29/2024 2:19 AM EDT  Workstation ID: [id number]    XR Chest 1 View    Result Date: 3/4/2024  Impression: No acute findings.  This report was finalized on 3/4/2024 9:05 PM by Dr. Anastasia Rodas M.D on Workstation: [id number]       Results for orders placed during the hospital encounter of 03/29/24    Duplex Carotid Ultrasound CAR    Interpretation Summary    Right internal carotid artery demonstrates normal flow without evidence of hemodynamically significant stenosis.    Left internal carotid artery demonstrates normal flow without evidence of hemodynamically significant stenosis.      Results for orders placed during the hospital encounter of 03/29/24    Duplex Carotid Ultrasound CAR    Interpretation Summary    Right internal carotid artery demonstrates normal flow without evidence of hemodynamically significant stenosis.    Left internal carotid artery demonstrates normal flow without evidence of hemodynamically significant stenosis.      Results for orders placed during the hospital encounter of 03/29/24    Adult Transthoracic Echo Complete With Contrast if Necessary Per Protocol    Interpretation Summary    Left ventricular systolic function is normal. Calculated left ventricular EF = 63.4% Left ventricular ejection fraction appears to be 61 - 65%.    Left ventricular diastolic function is consistent with (grade I) impaired relaxation.    Left atrial volume is moderately increased.    Estimated right ventricular systolic pressure from tricuspid regurgitation is mildly elevated (35-45 mmHg).    No significant valvular abnormalities noted.      Labs Pending at Discharge:      Procedures Performed           Consults:   Consults       Date and Time Order Name Status Description     3/29/2024  4:53 AM Inpatient Cardiology Consult Completed     3/29/2024  4:29 AM Hospitalist (on-call MD unless specified)                Discharge Details        Discharge Medications        Continue These Medications        Instructions Start Date   albuterol sulfate  (90 Base) MCG/ACT inhaler  Commonly known as: PROVENTIL HFA;VENTOLIN HFA;PROAIR HFA   2 puffs, Inhalation, Every 4 Hours PRN      atorvastatin 40 MG tablet  Commonly known as: LIPITOR   40 mg, Oral, Nightly, 7pm      cholecalciferol 25 MCG (1000 UT) tablet   1,000 Units, Oral, Daily      clopidogrel 75 MG tablet  Commonly known as: PLAVIX   75 mg, Oral, Daily      fish oil 1000 MG capsule capsule   1,000 mg, Oral, Daily With Breakfast      HYDROcodone-acetaminophen  MG per tablet  Commonly known as: NORCO   1 tablet, Oral, 4 Times Daily      levothyroxine 100 MCG tablet  Commonly known as: SYNTHROID, LEVOTHROID   100 mcg, Oral, Daily      nitrofurantoin 50 MG capsule  Commonly known as: MACRODANTIN   50 mg, Oral, Nightly      pantoprazole 40 MG EC tablet  Commonly known as: PROTONIX   40 mg, Oral, 2 Times Daily      phenazopyridine 200 MG tablet  Commonly known as: Pyridium   200 mg, Oral, 3 Times Daily PRN      propranolol 20 MG tablet  Commonly known as: INDERAL   20 mg, Oral, 3 Times Daily      rOPINIRole XL 2 MG 24 hr tablet  Commonly known as: REQUIP XL   2 mg, Oral, Nightly, 7 pm      sertraline 100 MG tablet  Commonly known as: ZOLOFT   100 mg, Oral, Daily               Allergies   Allergen Reactions    Benadryl [Diphenhydramine] Other (See Comments)     Hypersensitive to RLS    Codeine Itching and Other (See Comments)     Turns red from neck up    Nsaids Other (See Comments)     KIDNEY PROBLEMS    Ambien [Zolpidem] Unknown - High Severity     Sleep walks    Carisoprodol Anxiety    Cyclobenzaprine Anxiety    Levaquin [Levofloxacin] Hives and Rash    Metaxalone Anxiety    Morphine And Related Hives and Rash     Report had a  reaction and tried since with no issues.    Other Unknown - High Severity     MUSCLE RELAXERS CAUSE RESTLESSNESS  Hypersensitive to RLS. **6/7/2023 - PATIENT REPORTS SHE IS NOT ALLERGIC TO VERSED**    Penicillins Rash    Phenergan [Promethazine Hcl] Other (See Comments)     Makes pt hypersensitive and increases her RLS S/S    Reglan [Metoclopramide] Other (See Comments)     Makes her hypersensitive  increases s/s RLS    Ultram [Tramadol] Itching and Rash         Discharge Disposition:     Home or Self Care    Diet:  Hospital:  Diet Order   Procedures    Diet: Cardiac; Healthy Heart (2-3 Na+); Fluid Consistency: Thin (IDDSI 0)         Discharge Activity:         CODE STATUS:  Code Status and Medical Interventions:   Ordered at: 03/29/24 0451     Code Status (Patient has no pulse and is not breathing):    CPR (Attempt to Resuscitate)     Medical Interventions (Patient has pulse or is breathing):    Full Support         Future Appointments   Date Time Provider Department Center   4/3/2024 11:00 AM Smiley Singh APRN [facility]           Time spent on Discharge including face to face service:  >30 minutes    Signature: Electronically signed by Anish Martinez MD, 03/31/24, 08:29 EDT.  [facility] Hospitalist Team

## 2024-03-31 NOTE — PLAN OF CARE
Problem: Adult Inpatient Plan of Care  Goal: Plan of Care Review  Outcome: Ongoing, Not Progressing  Flowsheets (Taken 3/31/2024 0232)  Progress: no change  Plan of Care Reviewed With: patient  Goal: Patient-Specific Goal (Individualized)  Outcome: Ongoing, Not Progressing  Goal: Absence of Hospital-Acquired Illness or Injury  Outcome: Ongoing, Not Progressing  Intervention: Identify and Manage Fall Risk  Recent Flowsheet Documentation  Taken 3/31/2024 0039 by Mayda Ventura RN  Safety Promotion/Fall Prevention:   assistive device/personal items within reach   clutter free environment maintained   nonskid shoes/slippers when out of bed   room organization consistent   safety round/check completed  Taken 3/30/2024 2230 by Mayda Ventura RN  Safety Promotion/Fall Prevention:   assistive device/personal items within reach   clutter free environment maintained   nonskid shoes/slippers when out of bed   room organization consistent   safety round/check completed  Taken 3/30/2024 2055 by Mayda Ventura RN  Safety Promotion/Fall Prevention:   assistive device/personal items within reach   clutter free environment maintained   nonskid shoes/slippers when out of bed   room organization consistent   safety round/check completed  Intervention: Prevent Skin Injury  Recent Flowsheet Documentation  Taken 3/30/2024 2055 by Mayda Ventura RN  Body Position:   supine   position changed independently  Intervention: Prevent and Manage VTE (Venous Thromboembolism) Risk  Recent Flowsheet Documentation  Taken 3/30/2024 2055 by Mayda Ventura RN  Activity Management:   ambulated in room   up ad quinn   activity encouraged  VTE Prevention/Management: sequential compression devices off  Range of Motion: active ROM (range of motion) encouraged  Intervention: Prevent Infection  Recent Flowsheet Documentation  Taken 3/31/2024 0039 by Mayda Ventura RN  Infection Prevention:   environmental surveillance performed   hand hygiene promoted    single patient room provided  Taken 3/30/2024 2230 by Mayda Ventura RN  Infection Prevention:   environmental surveillance performed   hand hygiene promoted   single patient room provided  Taken 3/30/2024 2055 by Mayda Ventura RN  Infection Prevention:   environmental surveillance performed   hand hygiene promoted   single patient room provided  Goal: Optimal Comfort and Wellbeing  Outcome: Ongoing, Not Progressing  Intervention: Monitor Pain and Promote Comfort  Recent Flowsheet Documentation  Taken 3/31/2024 0039 by Mayda Ventura RN  Pain Management Interventions: see MAR  Taken 3/30/2024 2055 by Mayda Ventura RN  Pain Management Interventions:   diversional activity provided   care clustered  Intervention: Provide Person-Centered Care  Recent Flowsheet Documentation  Taken 3/30/2024 2055 by Mayda Ventura RN  Trust Relationship/Rapport:   care explained   choices provided  Goal: Readiness for Transition of Care  Outcome: Ongoing, Not Progressing     Problem: Fall Injury Risk  Goal: Absence of Fall and Fall-Related Injury  Outcome: Ongoing, Not Progressing  Intervention: Identify and Manage Contributors  Recent Flowsheet Documentation  Taken 3/31/2024 0039 by Mayda Ventura RN  Medication Review/Management:   medications reviewed   high-risk medications identified  Taken 3/30/2024 2230 by Mayda Ventura RN  Medication Review/Management:   medications reviewed   high-risk medications identified  Taken 3/30/2024 2055 by Mayda Ventura RN  Medication Review/Management:   medications reviewed   high-risk medications identified  Self-Care Promotion: independence encouraged  Intervention: Promote Injury-Free Environment  Recent Flowsheet Documentation  Taken 3/31/2024 0039 by Mayda Ventura RN  Safety Promotion/Fall Prevention:   assistive device/personal items within reach   clutter free environment maintained   nonskid shoes/slippers when out of bed   room organization consistent   safety round/check  completed  Taken 3/30/2024 2230 by Mayda Ventura RN  Safety Promotion/Fall Prevention:   assistive device/personal items within reach   clutter free environment maintained   nonskid shoes/slippers when out of bed   room organization consistent   safety round/check completed  Taken 3/30/2024 2055 by Mayda Ventura, RN  Safety Promotion/Fall Prevention:   assistive device/personal items within reach   clutter free environment maintained   nonskid shoes/slippers when out of bed   room organization consistent   safety round/check completed     Problem: Breathing Pattern Ineffective  Goal: Effective Breathing Pattern  Outcome: Ongoing, Not Progressing  Intervention: Promote Improved Breathing Pattern  Recent Flowsheet Documentation  Taken 3/30/2024 2055 by Mayda Ventura RN  Supportive Measures: active listening utilized  Head of Bed (HOB) Positioning: HOB at 20-30 degrees  Breathing Techniques/Airway Clearance: deep/controlled cough encouraged   Goal Outcome Evaluation:  Plan of Care Reviewed With: patient        Progress: no change     Alert and oriented x 4. Able to verbalize needs and wants. Takes medication whole and tolerates well. Continent of bowel and bladder. Independent for transfers/ambulation. No longer requires O2 therapy at this time. C/O lower back and L hip pain, PRN Tylenol and Norco administered with positive effect noted. Continues to be followed by cardiology. MCOT to be placed in AM, prior to discharge. NaCl infusing at 100 ml/hr and tolerating well. Currently in bed, eyes closed. Rise and fall of chest observed. Call bell in reach. Per case management, discharge plan is to return home with family.

## 2024-03-31 NOTE — PROGRESS NOTES
Referring Provider: Anish Martinez MD    Reason for follow-up: Syncope     Patient Care Team:  Manisha Britton APRN as PCP - General (Nurse Practitioner)  Kilo Cook MD as Consulting Physician (Cardiology)  Shawn Cox Jr., MD (General Surgery)      SUBJECTIVE  Feels well today and wants to go home.  Symptoms have resolved.     ROS  Review of all systems negative except as indicated.    Since I have last seen, the patient has been without any chest discomfort, shortness of breath, palpitations, dizziness or syncope.  Denies having any headache, abdominal pain, nausea, vomiting, diarrhea, constipation, loss of weight or loss of appetite.  Denies having any excessive bruising, hematuria or blood in the stool.  ROS      Personal History:    Past Medical History:   Diagnosis Date    Arthritis     Asthma     Brain TIA 2021    COVID 11/2020    Depression     Disease of thyroid gland     E. coli UTI     pt states colonized on poab    Heart murmur     History of anemia     History of small bowel obstruction     D/T ADHESIONS, HAD MULTIPLE SURGERIES (5)    History of transfusion     no reaction    History of ulcer disease     WITH GI BLEED AND TRANSFUSION    Hyperlipidemia     Hypertension     Kidney stone     Low back pain     Mitral valve prolapse     Morbid obesity with BMI of 40.0-44.9, adult 02/24/2020    Osteopenia     Osteoporosis     Renal insufficiency     Tachycardia     FAMILY MD MANAGES, PT DENIES AFIB/FLUTTER HX    Tinnitus     Tinnitus of both ears     Uses contact lenses     UTI (urinary tract infection) 02/22/2017    FINISHED ALL ABX, BACTRIM 2/28/17       Past Surgical History:   Procedure Laterality Date    APPENDECTOMY      BOTOX INJECTION      bladder to control incontinence    CARDIAC CATHETERIZATION N/A 12/15/2020    Procedure: Left Heart Cath;  Surgeon: Kilo Cook MD;  Location: [facility] CATH INVASIVE LOCATION;  Service: Cardiology;  Laterality: N/A;    CARDIAC  CATHETERIZATION N/A 12/15/2020    Procedure: Left ventriculography;  Surgeon: Kilo Cook MD;  Location:  [facility] CATH INVASIVE LOCATION;  Service: Cardiology;  Laterality: N/A;    CARDIAC CATHETERIZATION N/A 12/15/2020    Procedure: Coronary angiography;  Surgeon: Kilo Cook MD;  Location:  [facility] CATH INVASIVE LOCATION;  Service: Cardiology;  Laterality: N/A;    CATARACT EXTRACTION EXTRACAPSULAR W/ INTRAOCULAR LENS IMPLANTATION Bilateral     CERVICAL FUSION      CHOLECYSTECTOMY      COLONOSCOPY      CYSTOSCOPY URETEROSCOPY STONE MANIPULATION/EXTRACTION      X3    CYSTOSCOPY W/ URETERAL STENT PLACEMENT Left 04/18/2018    Procedure: CYSTOSCOPY URETERAL STENT INSERTION;  Surgeon: Arnol Dewey MD;  Location: [facility] MAIN OR;  Service: Urology    ENDOSCOPY      HYSTERECTOMY      LYSIS OF ABDOMINAL ADHESIONS      MULTIPLE    ROTATOR CUFF REPAIR Left     ULNAR NERVE TRANSPOSITION Left     URETEROSCOPY LASER LITHOTRIPSY WITH STENT INSERTION Left 03/01/2017    Procedure: LT URETEROSCOPY LASER LITHOTRIPSY WITH STENT INSERTION AND STONE BASKET EXTRACTION BILATERAL RETROGRADE PYLOGRAM LASER LITHROTRIPSY;  Surgeon: Arnol Hernandez MD;  Location: [facility] MAIN OR;  Service:     URETEROSCOPY LASER LITHOTRIPSY WITH STENT INSERTION Right 6/7/2023    Procedure: RIGHT URETEROSCOPY CYSTOSCOPY RETOGRADE PYELOGRAM WITH STENT PLACEMENT;  Surgeon: Arnol Hernandez MD;  Location: [facility] MAIN OR;  Service: Urology;  Laterality: Right;       Family History   Problem Relation Age of Onset    Heart failure Mother     Heart disease Mother     Heart attack Mother     Hyperlipidemia Father     Heart failure Father     Heart disease Father     Hypertension Sister     Hyperlipidemia Sister     Hypertension Brother     Hyperlipidemia Brother     Hypertension Brother     Malig Hyperthermia Neg Hx        Social History     Tobacco Use    Smoking status: Never    Smokeless tobacco: Never   Vaping Use     Vaping status: Never Used   Substance Use Topics    Alcohol use: No    Drug use: Never        Home meds:  Prior to Admission medications    Medication Sig Start Date End Date Taking? Authorizing Provider   albuterol sulfate  (90 Base) MCG/ACT inhaler Inhale 2 puffs Every 4 (Four) Hours As Needed for Wheezing.   Yes Tatyana Turk MD   atorvastatin (LIPITOR) 40 MG tablet Take 1 tablet by mouth Every Night. 7pm 12/8/20  Yes Tatyana Turk MD   clopidogrel (PLAVIX) 75 MG tablet Take 1 tablet by mouth Daily.   Yes Tatyana Turk MD   HYDROcodone-acetaminophen (NORCO)  MG per tablet Take 1 tablet by mouth 4 (Four) Times a Day.   Yes Tatyana Turk MD   levothyroxine (SYNTHROID, LEVOTHROID) 100 MCG tablet Take 1 tablet by mouth Daily.   Yes Tatyana Turk MD   nitrofurantoin (MACRODANTIN) 50 MG capsule Take 1 capsule by mouth Every Night.   Yes Tatyana Turk MD   pantoprazole (PROTONIX) 40 MG EC tablet Take 1 tablet by mouth 2 (Two) Times a Day.   Yes Tatyana Turk MD   propranolol (INDERAL) 20 MG tablet Take 1 tablet by mouth 3 (Three) Times a Day. 10/19/20  Yes Tatyana Turk MD   rOPINIRole XL (REQUIP XL) 2 MG 24 hr tablet Take 1 tablet by mouth Every Night. 7 pm 2/14/20  Yes Tatyana Truk MD   sertraline (ZOLOFT) 100 MG tablet Take 1 tablet by mouth Daily.   Yes Tatyana Turk MD   cholecalciferol (VITAMIN D3) 25 MCG (1000 UT) tablet Take 1 tablet by mouth Daily.    Tatyana Turk MD   Omega-3 Fatty Acids (fish oil) 1000 MG capsule capsule Take 1 capsule by mouth Daily With Breakfast.    Tatyana Turk MD   phenazopyridine (Pyridium) 200 MG tablet Take 1 tablet by mouth 3 (Three) Times a Day As Needed for Bladder Spasms. 6/7/23   Arnol Hernandez MD       Allergies:  Benadryl [diphenhydramine], Codeine, Nsaids, Ambien [zolpidem], Carisoprodol, Cyclobenzaprine, Levaquin [levofloxacin], Metaxalone, Morphine and  "related, Other, Penicillins, Phenergan [promethazine hcl], Reglan [metoclopramide], and Ultram [tramadol]    Scheduled Meds:atorvastatin, 40 mg, Oral, Nightly  clopidogrel, 75 mg, Oral, Daily  levothyroxine, 100 mcg, Oral, Q AM  pantoprazole, 40 mg, Oral, BID  propranolol, 20 mg, Oral, TID  rOPINIRole, 0.75 mg, Oral, Q8H  sertraline, 100 mg, Oral, Daily      Continuous Infusions:sodium chloride, 100 mL/hr, Last Rate: 100 mL/hr (03/30/24 1059)      PRN Meds:.  acetaminophen **OR** acetaminophen **OR** acetaminophen    senna-docusate sodium **AND** polyethylene glycol **AND** bisacodyl **AND** bisacodyl    Calcium Replacement - Follow Nurse / BPA Driven Protocol    HYDROcodone-acetaminophen    Magnesium Standard Dose Replacement - Follow Nurse / BPA Driven Protocol    melatonin    nitroglycerin    ondansetron    phenazopyridine    Phosphorus Replacement - Follow Nurse / BPA Driven Protocol    Potassium Replacement - Follow Nurse / BPA Driven Protocol    [COMPLETED] Insert Peripheral IV **AND** sodium chloride      OBJECTIVE    Vital Signs  Vitals:    03/30/24 1333 03/30/24 1636 03/30/24 2047 03/31/24 0324   BP:  128/64 105/47 116/53   BP Location:  Left arm Left arm Left arm   Patient Position:  Lying Lying Lying   Pulse:  89 76 76   Resp:   21 19   Temp:   98.2 °F (36.8 °C) 98.4 °F (36.9 °C)   TempSrc:   Oral Oral   SpO2:   97% 98%   Weight: 90.7 kg (200 lb)      Height: 162.6 cm (64\")          Flowsheet Rows      Flowsheet Row First Filed Value   Admission Height 162.6 cm (64\") Documented at 03/29/2024 0058   Admission Weight 91 kg (200 lb 9.9 oz) Documented at 03/29/2024 0058              Intake/Output Summary (Last 24 hours) at 3/31/2024 0700  Last data filed at 3/30/2024 2047  Gross per 24 hour   Intake 2070 ml   Output --   Net 2070 ml          Telemetry: Sinus rhythm    Physical Exam:  The patient is alert, oriented and in no distress.  Vital signs as noted above.  Head and neck revealed no carotid bruits or " jugular venous distention.  No thyromegaly or lymphadenopathy is present  Lungs clear.  No wheezing.  Breath sounds are normal bilaterally.  Heart normal first and second heart sounds.  No murmur. No precordial rub is present.  No gallop is present.  Abdomen soft and nontender.  No organomegaly is present.  Extremities with good peripheral pulses without any pedal edema.  Skin warm and dry.  Musculoskeletal system is grossly normal.  CNS grossly normal.       Results Review:  I have personally reviewed the results from the time of this admission to 3/31/2024 07:00 EDT and agree with these findings:  []  Laboratory  []  Microbiology  []  Radiology  []  EKG/Telemetry   []  Cardiology/Vascular   []  Pathology  []  Old records  []  Other:    Most notable findings include:    Lab Results (last 24 hours)       Procedure Component Value Units Date/Time    Basic Metabolic Panel [[id number]]  (Abnormal) Collected: 03/31/24 0515    Specimen: Blood from Arm, Right Updated: 03/31/24 0608     Glucose 99 mg/dL      BUN 7 mg/dL      Creatinine 0.65 mg/dL      Sodium 145 mmol/L      Potassium 3.3 mmol/L      Comment: Slight hemolysis detected by analyzer. Result may be falsely elevated.        Chloride 111 mmol/L      CO2 22.0 mmol/L      Calcium 8.3 mg/dL      BUN/Creatinine Ratio 10.8     Anion Gap 12.0 mmol/L      eGFR 98.5 mL/min/1.73     Narrative:      GFR Normal >60  Chronic Kidney Disease <60  Kidney Failure <15      CBC & Differential [[id number]]  (Abnormal) Collected: 03/31/24 0515    Specimen: Blood from Arm, Right Updated: 03/31/24 0548    Narrative:      The following orders were created for panel order CBC & Differential.  Procedure                               Abnormality         Status                     ---------                               -----------         ------                     CBC Auto Differential[[id number]]        Abnormal            Final result                 Please view results for these tests on  the individual orders.    CBC Auto Differential [[id number]]  (Abnormal) Collected: 03/31/24 0515    Specimen: Blood from Arm, Right Updated: 03/31/24 0548     WBC 6.05 10*3/mm3      RBC 3.87 10*6/mm3      Hemoglobin 10.5 g/dL      Hematocrit 34.3 %      MCV 88.6 fL      MCH 27.1 pg      MCHC 30.6 g/dL      RDW 13.7 %      RDW-SD 44.7 fl      MPV 9.9 fL      Platelets 210 10*3/mm3      Neutrophil % 59.0 %      Lymphocyte % 32.2 %      Monocyte % 4.3 %      Eosinophil % 3.5 %      Basophil % 0.5 %      Immature Grans % 0.5 %      Neutrophils, Absolute 3.57 10*3/mm3      Lymphocytes, Absolute 1.95 10*3/mm3      Monocytes, Absolute 0.26 10*3/mm3      Eosinophils, Absolute 0.21 10*3/mm3      Basophils, Absolute 0.03 10*3/mm3      Immature Grans, Absolute 0.03 10*3/mm3      nRBC 0.0 /100 WBC             Imaging Results (Last 24 Hours)       ** No results found for the last 24 hours. **            LAB RESULTS (LAST 7 DAYS)    CBC  Results from last 7 days   Lab Units 03/31/24 0515 03/30/24 0259 03/29/24  0230   WBC 10*3/mm3 6.05 5.22 6.58   RBC 10*6/mm3 3.87 3.99 4.13   HEMOGLOBIN g/dL 10.5* 10.7* 11.4*   HEMATOCRIT % 34.3 35.1 36.9   MCV fL 88.6 88.0 89.3   PLATELETS 10*3/mm3 210 212 243       BMP  Results from last 7 days   Lab Units 03/31/24 0515 03/30/24  0259 03/29/24  0230   SODIUM mmol/L 145 142 139   POTASSIUM mmol/L 3.3* 3.6 3.9   CHLORIDE mmol/L 111* 107 105   CO2 mmol/L 22.0 23.0 23.0   BUN mg/dL 7* 13 24*   CREATININE mg/dL 0.65 0.64 1.28*   GLUCOSE mg/dL 99 85 98       CMP   Results from last 7 days   Lab Units 03/31/24 0515 03/30/24  0259 03/29/24  0230   SODIUM mmol/L 145 142 139   POTASSIUM mmol/L 3.3* 3.6 3.9   CHLORIDE mmol/L 111* 107 105   CO2 mmol/L 22.0 23.0 23.0   BUN mg/dL 7* 13 24*   CREATININE mg/dL 0.65 0.64 1.28*   GLUCOSE mg/dL 99 85 98   ALBUMIN g/dL  --   --  4.5   BILIRUBIN mg/dL  --   --  0.3   ALK PHOS U/L  --   --  84   AST (SGOT) U/L  --   --  20   ALT (SGPT) U/L  --   --  19        BNP        TROPONIN  Results from last 7 days   Lab Units 03/29/24  0230   HSTROP T ng/L 10       CoAg        Creatinine Clearance  Estimated Creatinine Clearance: 95.4 mL/min (by C-G formula based on SCr of 0.65 mg/dL).    ABG        Radiology  No radiology results for the last day      EKG  I personally viewed and interpreted the patient's EKG/Telemetry data:  ECG 12 Lead Syncope   Final Result   HEART RATE= 77  bpm   RR Interval= 780  ms   HI Interval= 167  ms   P Horizontal Axis= 12  deg   P Front Axis= 42  deg   QRSD Interval= 93  ms   QT Interval= 396  ms   QTcB= 448  ms   QRS Axis= -41  deg   T Wave Axis= 3  deg   - OTHERWISE NORMAL ECG -   Sinus rhythm   Low voltage, precordial leads   LAD   LVA   When compared with ECG of 04-Mar-2024 20:24:22,   Significant axis, voltage or hypertrophy change   Electronically Signed By: Zaki Yu (LILA) 29-Mar-2024 07:05:49   Date and Time of Study: 2024-03-29 01:32:49            Echocardiogram:    Results for orders placed during the hospital encounter of 03/29/24    Adult Transthoracic Echo Complete With Contrast if Necessary Per Protocol    Interpretation Summary    Left ventricular systolic function is normal. Calculated left ventricular EF = 63.4% Left ventricular ejection fraction appears to be 61 - 65%.    Left ventricular diastolic function is consistent with (grade I) impaired relaxation.    Left atrial volume is moderately increased.    Estimated right ventricular systolic pressure from tricuspid regurgitation is mildly elevated (35-45 mmHg).    No significant valvular abnormalities noted.        Stress Test:  Results for orders placed in visit on 02/24/20    Stress Test With Myocardial Perfusion One Day    Interpretation Summary  · Findings consistent with a normal ECG stress test.  · Left ventricular ejection fraction is normal (Calculated EF = 60%).  · Myocardial perfusion imaging indicates a normal myocardial perfusion study with no evidence of  ischemia.  · Impressions are consistent with a low risk study.  · Very small inferior ischemia seen on polar view , does not appears to be clinically significant    Asymptomatic for chest pain. ECG is negative for ischemia.  Ectopy: none  B/P is appropriate for Beta-blocker therapy.  Pharmacologic study due to inability to tolerate increasing speed and grade of treadmill due to c/o low back pain and Beta-blocker therapy.  Unable to participate in low level exercise due to c/o low back pain.  Supervised by:  Smiley LUNA         Cardiac Catheterization:  Results for orders placed during the hospital encounter of 12/15/20    Cardiac Catheterization/Vascular Study    Narrative  Table formatting from the original result was not included.    · Successful right and left coronary angiogram and LV gram  · Normal coronary arteries  · Normal LV gram    December 15, 2020    Cherelle Ayala  1960  60 y.o.  female  [id number]    Procedures Performed    1. Left heart catheterization  2. Selective coronary angiography  3. Left ventriculography        :   Kilo Cook MD    Vascular Access Site: Right radial    Indication for procedure: Positive stress test    Referring Physician:      Pertinent History    Past Medical History:  Diagnosis Date   Arthritis   Heart murmur   History of anemia   History of small bowel obstruction  D/T ADHESIONS, HAD MULTIPLE SURGERIES (5)   History of transfusion   History of ulcer disease  WITH GI BLEED AND TRANSFUSION   Hyperlipidemia   Kidney stone   Low back pain   Mitral valve prolapse   Renal insufficiency   Tachycardia  FAMILY MD MANAGES, PT DENIES AFIB/FLUTTER HX   Tinnitus of both ears   Uses contact lenses   UTI (urinary tract infection) 02/22/2017  FINISHED ALL ABX, BACTRIM 2/28/17    Past Surgical History:  Procedure Laterality Date   APPENDECTOMY   CERVICAL FUSION   CHOLECYSTECTOMY   CYSTOSCOPY URETEROSCOPY STONE MANIPULATION/EXTRACTION  X3   CYSTOSCOPY  W/ URETERAL STENT PLACEMENT Left 4/18/2018  Procedure: CYSTOSCOPY URETERAL STENT INSERTION;  Surgeon: Arnol Dewey MD;  Location: [facility];  Service: Urology   HYSTERECTOMY   LYSIS OF ABDOMINAL ADHESIONS  MULTIPLE   ROTATOR CUFF REPAIR   ULNAR NERVE TRANSPOSITION Left   URETEROSCOPY LASER LITHOTRIPSY WITH STENT INSERTION Left 3/1/2017  Procedure: LT URETEROSCOPY LASER LITHOTRIPSY WITH STENT INSERTION AND STONE BASKET EXTRACTION BILATERAL RETROGRADE PYLOGRAM LASER LITHROTRIPSY;  Surgeon: Arnol Hernandez MD;  Location: [facility] OR;  Service:      Procedure Note    After discussing the risks, benefits, and alternatives of the procedure, informed consent was obtained.  The vascular access site was prepped and draped in the usual sterile fashion.  2% lidocaine was used for local anesthesia. Appropriate landmarks were assessed.  A 5 [place] short sheath was inserted in the artery using the modified Seldinger technique.    Selective coronary angiography was performed with JL4 and JR4 diagnostic catheters. Left ventriculogram  was performed with an angled pigtail catheter.  All exchanges were performed over the wire.  There were no apparent acute or early complications.  The patient tolerated the procedure well and was transferred to the recovery area in stable condition.    Artery hemostasis was achieved with manual pressure.        Contrast:    79  cc  Complications:  None  Blood Loss: minimal      Hemodynamics    Pressures    Ao:    120/80 mmHg  LV:    120/12 mmHg  End-diastolic pressure:  12 mmHg  No significant aortic valvular gradient on pullback    Coronary Angiography    Left Main:  The left main originates in the left coronary cusp.  It bifurcates and gives rise to the LAD and circumflex system.  Normal left main    Left Anterior Descending: Normal left anterior descending including the diagonal and  branches    Left Circumflex: Nondominant and normal    Right Coronary Artery:   The right coronary artery originates in the right coronary cusp.  Dominant and normal    Left Ventriculography:    Estimated Ejection Fraction: 60 %  Wall motion abnormalities:  None  Mitral Regurgitation:  None    Impression:    1. Normal coronary arteries  2. Normal LV gram    Recommendations:    1. Medical management        I sincerely appreciate the opportunity to participate in your patient's care. Please feel free to contact me anytime if I can be of assistance in this or any other way.    Kilo Cook MD  12/15/2020  11:40 EST         Other:         ASSESSMENT & PLAN:    Principal Problem:    Syncope  Active Problems:    Essential hypertension    Obesity (BMI 30-39.9)    JACQUIE (acute kidney injury)    Syncope  CT head unremarkable  Vital signs normal and ECG plus troponins are unremarkable  Orthostatics negative for hypotension or tachycardia  Carotid ultrasound is unremarkable  Echocardiogram with no obvious structural abnormalities.  Grade 1 diastolic dysfunction  No events on telemetry  Vasovagal syncope high on the differential.  Meclizine as needed.  Holter at the time of discharge     Acute kidney injury  Creatinine 1.28 compared to 0.7 at baseline  Creatinine is now normal 0.65 with hydration  Dehydration may have contributed to syncope as well     Hypertension  Blood pressure is currently well-controlled.     Hyperlipidemia  LDL 73, HDL 46, triglyceride 231 and total cholesterol 157  Continue statin     Obesity  BMI 34.4  Lifestyle modifications recommended to the patient  Screening and treatment for sleep apnea recommended.    Okay to discharge from cardiac standpoint.      Toribio Mata MD  03/31/24  07:00 EDT

## 2024-03-31 NOTE — CASE MANAGEMENT/SOCIAL WORK
Continued Stay Note  [facility]     Patient Name: Cherelle Ayala  MRN: [id number]  Today's Date: 3/31/2024    Admit Date: 3/29/2024    Plan: home with family   Discharge Plan       Row Name 03/31/24 1216       Plan    Plan home with family    Plan Comments no home o2 required per 6 min walk completed 3/31                      Expected Discharge Date and Time       Expected Discharge Date Expected Discharge Time    Mar 31, 2024               Jane Portillo RN

## 2024-03-31 NOTE — PLAN OF CARE
Continue to monitor and assess pain.  Patient is alert and oriented and has chronic pain.   Problem: Adult Inpatient Plan of Care  Goal: Plan of Care Review  Outcome: Ongoing, Progressing  Flowsheets  Taken 3/31/2024 0735 by Fannie Olivera RN  Progress: improving  Outcome Evaluation: Pain is still chronic but patient slept well and should d/c home  Taken 3/31/2024 0232 by Mayda Venutra RN  Plan of Care Reviewed With: patient  Goal: Patient-Specific Goal (Individualized)  Outcome: Ongoing, Progressing  Goal: Absence of Hospital-Acquired Illness or Injury  Outcome: Ongoing, Progressing  Intervention: Identify and Manage Fall Risk  Flowsheets  Taken 3/31/2024 0735  Safety Promotion/Fall Prevention:   clutter free environment maintained   assistive device/personal items within reach   safety round/check completed  Taken 3/31/2024 0728  Safety Promotion/Fall Prevention:   assistive device/personal items within reach   clutter free environment maintained   safety round/check completed  Intervention: Prevent Skin Injury  Flowsheets  Taken 3/31/2024 0728 by Fannie Olivera RN  Skin Protection: adhesive use limited  Taken 3/30/2024 2055 by Mayda Ventura RN  Body Position:   supine   position changed independently  Intervention: Prevent and Manage VTE (Venous Thromboembolism) Risk  Flowsheets  Taken 3/31/2024 0728 by Fannie Olivera RN  VTE Prevention/Management: (see MAR) other (see comments)  Taken 3/30/2024 2300 by Lina Carrion, PCT  Activity Management: up ad quinn  Taken 3/30/2024 2055 by Mayda Ventura RN  Range of Motion: active ROM (range of motion) encouraged  Intervention: Prevent Infection  Flowsheets (Taken 3/31/2024 0728)  Infection Prevention:   single patient room provided   rest/sleep promoted   hand hygiene promoted   personal protective equipment utilized  Goal: Optimal Comfort and Wellbeing  Outcome: Ongoing, Progressing  Intervention: Monitor Pain and Promote Comfort  Flowsheets (Taken  3/31/2024 0728)  Pain Management Interventions:   see MAR   quiet environment facilitated   relaxation techniques promoted  Intervention: Provide Person-Centered Care  Flowsheets (Taken 3/30/2024 2055 by Mayda Ventura, RN)  Trust Relationship/Rapport:   care explained   choices provided  Goal: Readiness for Transition of Care  Outcome: Ongoing, Progressing  Intervention: Mutually Develop Transition Plan  Flowsheets  Taken 3/29/2024 1142 by Han Ruth, RN  Patient/Family Anticipates Transition to: home with family  Taken 3/29/2024 1135 by Han Ruth, RN  Equipment Currently Used at Home: none  Taken 3/29/2024 0928 by Alexandrea Smith, NASIMA  Equipment Needed After Discharge: (currently on O2, doesn't wear at home) other (see comments)  Anticipated Changes Related to Illness: none  Transportation Anticipated: family or friend will provide  Transportation Concerns: none  Concerns to be Addressed: denies needs/concerns at this time  Patient/Family Anticipated Services at Transition: none     Problem: Fall Injury Risk  Goal: Absence of Fall and Fall-Related Injury  Outcome: Ongoing, Progressing  Intervention: Identify and Manage Contributors  Flowsheets  Taken 3/31/2024 0728 by Fannie Olivera, RN  Medication Review/Management: medications reviewed  Taken 3/30/2024 2055 by Mayda Ventura, RN  Self-Care Promotion: independence encouraged  Intervention: Promote Injury-Free Environment  Flowsheets  Taken 3/31/2024 0735  Safety Promotion/Fall Prevention:   clutter free environment maintained   assistive device/personal items within reach   safety round/check completed  Taken 3/31/2024 0728  Safety Promotion/Fall Prevention:   assistive device/personal items within reach   clutter free environment maintained   safety round/check completed     Problem: Breathing Pattern Ineffective  Goal: Effective Breathing Pattern  Outcome: Ongoing, Progressing  Intervention: Promote Improved Breathing Pattern  Flowsheets  Taken  3/31/2024 0735 by Fannie lOivera, RN  Head of Bed (HOB) Positioning: HOB elevated  Taken 3/30/2024 2055 by Mayda Ventura RN  Supportive Measures: active listening utilized  Breathing Techniques/Airway Clearance: deep/controlled cough encouraged   Goal Outcome Evaluation:           Progress: improving  Outcome Evaluation: Pain is still chronic but patient slept well and should d/c home

## 2024-09-04 ENCOUNTER — TELEPHONE (OUTPATIENT)
Dept: CARDIOLOGY | Facility: CLINIC | Age: 64
End: 2024-09-04

## 2024-09-04 NOTE — TELEPHONE ENCOUNTER
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
